# Patient Record
Sex: MALE | Race: WHITE | NOT HISPANIC OR LATINO | Employment: FULL TIME | URBAN - METROPOLITAN AREA
[De-identification: names, ages, dates, MRNs, and addresses within clinical notes are randomized per-mention and may not be internally consistent; named-entity substitution may affect disease eponyms.]

---

## 2017-02-03 ENCOUNTER — OFFICE VISIT (OUTPATIENT)
Dept: FAMILY MEDICINE | Facility: CLINIC | Age: 57
End: 2017-02-03
Payer: COMMERCIAL

## 2017-02-03 DIAGNOSIS — L20.9 ATOPIC DERMATITIS, UNSPECIFIED TYPE: ICD-10-CM

## 2017-02-03 DIAGNOSIS — B35.1 DERMATOPHYTOSIS OF NAIL: Primary | ICD-10-CM

## 2017-02-03 PROCEDURE — 99213 OFFICE O/P EST LOW 20 MIN: CPT | Performed by: PHYSICIAN ASSISTANT

## 2017-02-03 RX ORDER — TRIAMCINOLONE ACETONIDE 5 MG/G
CREAM TOPICAL
Qty: 30 G | Refills: 1 | Status: SHIPPED | OUTPATIENT
Start: 2017-02-03 | End: 2021-02-15

## 2017-02-03 RX ORDER — CICLOPIROX 80 MG/ML
SOLUTION TOPICAL
COMMUNITY
Start: 2017-02-01 | End: 2017-02-03

## 2017-02-03 ASSESSMENT — PAIN SCALES - GENERAL: PAINLEVEL: NO PAIN (0)

## 2017-02-03 NOTE — MR AVS SNAPSHOT
After Visit Summary   2/3/2017    Adriel Elam    MRN: [id number]           Patient Information     Date Of Birth          1960        Visit Information        Provider Department      2/3/2017 3:40 PM Sj Boyd PA-C [facility]        Today's Diagnoses     Dermatophytosis of nail    -  1     Atopic dermatitis, unspecified type           Care Instructions    [facility]    If you have any questions regarding to your visit please contact your care team:       Team Purple:   Clinic Hours Telephone Number   [address] Dr[address]   7am-7pm  Monday - Thursday   7am-5pm  Fridays  [phone]  (Appointment scheduling available 24/7)    Questions about your Visit?   Team Line:  [phone]   [facility] [place] [facility] 11am-9pm Monday-Friday Saturday-Sunday- 9am-5pm   [place] - 5pm-9pm Monday-Friday Saturday-Sunday- 9am-5pm  [phone] - [facility]  [phone] - [place]       What options do I have for visits at the clinic other than the traditional office visit?  To expand how we care for you, many of our providers are utilizing electronic visits (e-visits) and telephone visits, when medically appropriate, for interactions with their patients rather than a visit in the clinic.   We also offer nurse visits for many medical concerns. Just like any other service, we will bill your insurance company for this type of visit based on time spent on the phone with your provider. Not all insurance companies cover these visits. Please check with your medical insurance if this type of visit is covered. You will be responsible for any charges that are not paid by your insurance.      E-visits via [employer]:  generally incur a $35.00 fee.  Telephone visits:  Time spent on the phone: *charged based on time that is spent on the phone in increments of 10 minutes. Estimated cost:   5-10 mins  "$30.00   11-20 mins. $59.00   21-30 mins. $85.00     Use [employer] (secure email communication and access to your chart) to send your primary care provider a message or make an appointment. Ask someone on your Team how to sign up for [employer].  For a Price Quote for your services, please call our [employer] Price Line at [phone].  As always, Thank you for trusting us with your health care needs!    Discharged by Susana Choi CMA          Follow-ups after your visit        Who to contact     If you have questions or need follow up information about today's clinic visit or your schedule please contact [facility] directly at [phone].  Normal or non-critical lab and imaging results will be communicated to you by [employer], letter or phone within 4 business days after the clinic has received the results. If you do not hear from us within 7 days, please contact the clinic through [employer] or phone. If you have a critical or abnormal lab result, we will notify you by phone as soon as possible.  Submit refill requests through [employer] or call your pharmacy and they will forward the refill request to us. Please allow 3 business days for your refill to be completed.          Additional Information About Your Visit        [employer] Information     [employer] lets you send messages to your doctor, view your test results, renew your prescriptions, schedule appointments and more. To sign up, go to [email or web address] . Click on \"Log in\" on the left side of the screen, which will take you to the Welcome page. Then click on \"Sign up Now\" on the right side of the page.     You will be asked to enter the access code listed below, as well as some personal information. Please follow the directions to create your username and password.     Your access code is: [email or web address]  Expires: 2017  4:18 PM     Your access code will  in 90 days. If you need help or a new code, please call your [facility] or " "[phone].        Care EveryWhere ID     This is your Care EveryWhere ID. This could be used by other organizations to access your [place] medical records  [id number]        Your Vitals Were     Pulse Temperature Respirations Height BMI (Body Mass Index) Pulse Oximetry    59 98.6  F (37  C) (Oral) 16 5' 11.75\" (1.822 m) 30.74 kg/m2 99%       Blood Pressure from Last 3 Encounters:   02/03/17 142/80   09/06/16 154/96   03/06/16 183/84    Weight from Last 3 Encounters:   02/03/17 225 lb (102.059 kg)   09/06/16 209 lb (94.802 kg)   03/06/16 216 lb (97.977 kg)              Today, you had the following     No orders found for display         Today's Medication Changes          These changes are accurate as of: 2/3/17  4:18 PM.  If you have any questions, ask your nurse or doctor.               Start taking these medicines.        Dose/Directions    triamcinolone 0.5 % cream   Commonly known as:  KENALOG   Used for:  Atopic dermatitis, unspecified type   Started by:  Sj Boyd PA-C        Apply sparingly to affected area three times daily.   Quantity:  30 g   Refills:  1            Where to get your medicines      [facility] - [address]  [address] [facility] [address]    Hours:  24-hours Phone:  [phone]    - triamcinolone 0.5 % cream             Primary Care Provider Office Phone # Fax #    Sj Boyd PA-C [phone]       [address] [address]        Thank you!     Thank you for choosing [facility]  for your care. Our goal is always to provide you with excellent care. Hearing back from our patients is one way we can continue to improve our services. Please take a few minutes to complete the written survey that you may receive in the mail after your visit with us. Thank you!             Your " Updated Medication List - Protect others around you: Learn how to safely use, store and throw away your medicines at www.disposemymeds.org.          This list is accurate as of: 2/3/17  4:18 PM.  Always use your most recent med list.                   Brand Name Dispense Instructions for use    levothyroxine 88 MCG tablet    SYNTHROID/LEVOTHROID    90 tablet    Take 1 tablet (88 mcg) by mouth daily       ondansetron 8 MG tablet    ZOFRAN    9 tablet    Take 1 tablet (8 mg) by mouth every 8 hours as needed for nausea       triamcinolone 0.5 % cream    KENALOG    30 g    Apply sparingly to affected area three times daily.

## 2017-02-03 NOTE — PROGRESS NOTES
"Chief Complaint   Patient presents with     Toenail       Initial /80 mmHg  Pulse 59  Temp(Src) 98.6  F (37  C) (Oral)  Resp 16  Ht 5' 11.75\" (1.822 m)  Wt 225 lb (102.059 kg)  BMI 30.74 kg/m2  SpO2 99% Estimated body mass index is 30.74 kg/(m^2) as calculated from the following:    Height as of this encounter: 5' 11.75\" (1.822 m).    Weight as of this encounter: 225 lb (102.059 kg).  Medication Reconciliation: complete   Regular BP cuff  Idalia Gonsalez CMA        SUBJECTIVE:                                                    Adriel Elam is a 56 year old male who presents to clinic today for the following health issues:    Chief Complaint   Patient presents with     Toenail       Problem list and histories reviewed & adjusted, as indicated.  Additional history: 57 y/o male here for evaluation of right great toenail issue.  He did go to old PCP who did diagnose with \"fungus\" and given ciclopirox.  After about 1 month, it did seem to work quite well.  He did stop.  Over time, it did come back,      He also does get some dry itchy skin in winter over legs and arms, has used steroid cream in the past with good success.    Problem list, Medication list, Allergies, and Medical/Social/Surgical histories reviewed in EPIC and updated as appropriate.    ROS:  Constitutional, HEENT, cardiovascular, pulmonary, gi and gu systems are negative, except as otherwise noted.    OBJECTIVE:                                                    /80 mmHg  Pulse 59  Temp(Src) 98.6  F (37  C) (Oral)  Resp 16  Ht 5' 11.75\" (1.822 m)  Wt 225 lb (102.059 kg)  BMI 30.74 kg/m2  SpO2 99%  Body mass index is 30.74 kg/(m^2).  GENERAL: alert and no distress  EYES: Eyes grossly normal to inspection  RESP: lungs clear to auscultation - no rales, rhonchi or wheezes  CV: regular rate and rhythm, normal S1 S2, no S3 or S4, no murmur, click or rub, no peripheral edema and peripheral pulses strong  SKIN: onychomycosis over great " toe, non tender.    Diagnostic Test Results:  none      ASSESSMENT/PLAN:                                                            1. Dermatophytosis of nail  Patient does have ciclopirox refill, and since it did work last time, will use again.    2. Atopic dermatitis, unspecified type    - triamcinolone (KENALOG) 0.5 % cream; Apply sparingly to affected area three times daily.  Dispense: 30 g; Refill: 1    Follow up as needed.  Patient is due for labs, immunizations and colorectal screening.  Did encourage him to follow up with more complete exam so we can discuss updating.      Sj Boyd PA-C  [facility]

## 2017-02-03 NOTE — NURSING NOTE
"Chief Complaint   Patient presents with     Toenail       Initial /80 mmHg  Pulse 59  Temp(Src) 98.6  F (37  C) (Oral)  Resp 16  Ht 5' 11.75\" (1.822 m)  Wt 225 lb (102.059 kg)  BMI 30.74 kg/m2  SpO2 99% Estimated body mass index is 30.74 kg/(m^2) as calculated from the following:    Height as of this encounter: 5' 11.75\" (1.822 m).    Weight as of this encounter: 225 lb (102.059 kg).  Medication Reconciliation: complete   Idalia Gonsalez CMA      "

## 2017-02-03 NOTE — PATIENT INSTRUCTIONS
[facility]    If you have any questions regarding to your visit please contact your care team:       Team Purple:   Clinic Hours Telephone Number   [address] Dr.   7am-7pm  Monday - Thursday   7am-5pm  Fridays  [phone]  (Appointment scheduling available 24/7)    Questions about your Visit?   Team Line:  [phone]   Urgent Care - [place] and [facility] - 11am-9pm Monday-Friday Saturday-Sunday- 9am-5pm   [place] - 5pm-9pm Monday-Friday Saturday-Sunday- 9am-5pm  [phone] - [facility]  [phone] - [place]       What options do I have for visits at the clinic other than the traditional office visit?  To expand how we care for you, many of our providers are utilizing electronic visits (e-visits) and telephone visits, when medically appropriate, for interactions with their patients rather than a visit in the clinic.   We also offer nurse visits for many medical concerns. Just like any other service, we will bill your insurance company for this type of visit based on time spent on the phone with your provider. Not all insurance companies cover these visits. Please check with your medical insurance if this type of visit is covered. You will be responsible for any charges that are not paid by your insurance.      E-visits via [employer]:  generally incur a $35.00 fee.  Telephone visits:  Time spent on the phone: *charged based on time that is spent on the phone in increments of 10 minutes. Estimated cost:   5-10 mins $30.00   11-20 mins. $59.00   21-30 mins. $85.00     Use [employer] (secure email communication and access to your chart) to send your primary care provider a message or make an appointment. Ask someone on your Team how to sign up for [employer].  For a Price Quote for your services, please call our Consumer Price Line at [phone].  As always, Thank you for trusting us with your health care  needs!    Discharged by Susana Choi, CMA

## 2017-02-10 VITALS
WEIGHT: 225 LBS | SYSTOLIC BLOOD PRESSURE: 136 MMHG | HEART RATE: 59 BPM | TEMPERATURE: 98.6 F | DIASTOLIC BLOOD PRESSURE: 80 MMHG | OXYGEN SATURATION: 99 % | HEIGHT: 72 IN | BODY MASS INDEX: 30.48 KG/M2 | RESPIRATION RATE: 16 BRPM

## 2017-06-12 ENCOUNTER — OFFICE VISIT (OUTPATIENT)
Dept: FAMILY MEDICINE | Facility: CLINIC | Age: 57
End: 2017-06-12
Payer: COMMERCIAL

## 2017-06-12 VITALS
SYSTOLIC BLOOD PRESSURE: 164 MMHG | BODY MASS INDEX: 31.77 KG/M2 | TEMPERATURE: 97.3 F | WEIGHT: 234.6 LBS | HEIGHT: 72 IN | HEART RATE: 70 BPM | DIASTOLIC BLOOD PRESSURE: 80 MMHG | OXYGEN SATURATION: 97 %

## 2017-06-12 DIAGNOSIS — F41.1 GAD (GENERALIZED ANXIETY DISORDER): Primary | ICD-10-CM

## 2017-06-12 DIAGNOSIS — E03.9 HYPOTHYROIDISM (ACQUIRED): ICD-10-CM

## 2017-06-12 DIAGNOSIS — E78.5 HYPERLIPIDEMIA LDL GOAL <100: ICD-10-CM

## 2017-06-12 DIAGNOSIS — R97.20 ELEVATED PROSTATE SPECIFIC ANTIGEN (PSA): ICD-10-CM

## 2017-06-12 DIAGNOSIS — Z12.5 SCREENING FOR PROSTATE CANCER: ICD-10-CM

## 2017-06-12 DIAGNOSIS — R03.0 ELEVATED BLOOD PRESSURE READING WITHOUT DIAGNOSIS OF HYPERTENSION: ICD-10-CM

## 2017-06-12 DIAGNOSIS — Z12.11 SCREENING FOR COLON CANCER: ICD-10-CM

## 2017-06-12 PROCEDURE — 99214 OFFICE O/P EST MOD 30 MIN: CPT | Performed by: FAMILY MEDICINE

## 2017-06-12 RX ORDER — BUSPIRONE HYDROCHLORIDE 5 MG/1
TABLET ORAL
Qty: 150 TABLET | Refills: 0 | Status: SHIPPED | OUTPATIENT
Start: 2017-06-12 | End: 2017-07-17

## 2017-06-12 RX ORDER — PROCHLORPERAZINE MALEATE 10 MG
10 TABLET ORAL EVERY 6 HOURS PRN
COMMUNITY
End: 2017-10-20

## 2017-06-12 NOTE — NURSING NOTE
"Chief Complaint   Patient presents with     Establish Care     Anxiety     SOB, minor chest ache, nervous, Nausea        Initial /80  Pulse 70  Temp 97.3  F (36.3  C) (Oral)  Ht 5' 11.75\" (1.822 m)  Wt 234 lb 9.6 oz (106.4 kg)  SpO2 97%  BMI 32.04 kg/m2 Estimated body mass index is 32.04 kg/(m^2) as calculated from the following:    Height as of this encounter: 5' 11.75\" (1.822 m).    Weight as of this encounter: 234 lb 9.6 oz (106.4 kg).  Medication Reconciliation: complete     An TANIA Roberts    "

## 2017-06-12 NOTE — PROGRESS NOTES
SUBJECTIVE:                                                    Adriel Elam is a 57 year old male who presents to clinic today for the following health issues:     Abnormal Mood Symptoms      Duration: ongoing for 2 yrs     Description:  Depression: no   Anxiety: YES  Panic attacks: no      Accompanying signs and symptoms: see PHQ-9 and KIMANI scores    History (similar episodes/previous evaluation): None    Precipitating or alleviating factors: None    Therapies tried and outcome: none     Has nervous for past 2 yr; increased.  Worse in the last one month.  Stress: work is stressful, taking care of elderly parents.  Appetite and sleep great.  Anxious over; stressful situations, gets SOB, chest pain, nausea, a little shaky, a little sweat around the mouth.    Nausea:   Uses Prochlorperazine 10 m g  and Zofran 8 mg.     Hypothyroidism:    Been on Levothyroxine; at 88 mcg.    Elevated BP;  BP Readings from Last 6 Encounters:   06/12/17 164/80   02/03/17 136/80   09/06/16 (!) 154/96   03/06/16 183/84   01/18/16 138/84     Weight:   Been gaining weight, admits not doing any exercise.  Wt Readings from Last 4 Encounters:   06/12/17 234 lb 9.6 oz (106.4 kg)   02/03/17 225 lb (102.1 kg)   09/06/16 209 lb (94.8 kg)   03/06/16 216 lb (98 kg)     Problem list and histories reviewed & adjusted, as indicated.  Additional history: as documented    Patient Active Problem List   Diagnosis     Hypothyroidism     History reviewed. No pertinent surgical history.    Social History   Substance Use Topics     Smoking status: Never Smoker     Smokeless tobacco: Never Used     Alcohol use 0.0 oz/week     0 Standard drinks or equivalent per week      Comment: Rare      Family History   Problem Relation Age of Onset     CANCER No family hx of      DIABETES No family hx of      Hypertension No family hx of      CEREBROVASCULAR DISEASE No family hx of      Macular Degeneration No family hx of      Glaucoma No family hx of      Thyroid Disease  "No family hx of            Reviewed and updated as needed this visit by clinical staff  Tobacco  Allergies  Meds  Med Hx  Surg Hx  Fam Hx  Soc Hx      Reviewed and updated as needed this visit by Provider         ROS:  Constitutional, HEENT, cardiovascular, pulmonary, gi and gu systems are negative, except as otherwise noted.    OBJECTIVE:                                                    /80  Pulse 70  Temp 97.3  F (36.3  C) (Oral)  Ht 5' 11.75\" (1.822 m)  Wt 234 lb 9.6 oz (106.4 kg)  SpO2 97%  BMI 32.04 kg/m2  Body mass index is 32.04 kg/(m^2).  GENERAL: healthy, alert and no distress  NECK: no adenopathy, no asymmetry, masses, or scars and thyroid normal to palpation  RESP: lungs clear to auscultation - no rales, rhonchi or wheezes  CV: regular rate and rhythm, normal S1 S2, no S3 or S4, no murmur, click or rub, no peripheral edema and peripheral pulses strong  ABDOMEN: soft, nontender, no hepatosplenomegaly, no masses and bowel sounds normal  MS: no gross musculoskeletal defects noted, no edema    Diagnostic Test Results:        Results for orders placed or performed in visit on 09/06/16   TSH WITH FREE T4 REFLEX   Result Value Ref Range    TSH 2.89 0.40 - 4.00 mU/L        ASSESSMENT/PLAN:                                                    (F41.1) KIMANI (generalized anxiety disorder)  (primary encounter diagnosis)  Comment: I discussed the potential side effects of antianxiety medications as well as the liklihood of worsening before improvement over the next few weeks. I reemphasized the importance of a multi-armed approach towards the treatment of anxiety including regular exercise, adequate sleep, and a well rounded, low-glycemic diet.  In addition, I emphasized the importance of ongoing development of his support network. If new or worsening symptoms, he will seek help immediately.  Plan: busPIRone (BUSPAR) 5 MG tablet    (E03.9) Hypothyroidism (acquired)  Comment: Taking " Levothyroxine.  Plan: TSH with free T4 reflex    (Z68.32) BMI 32.0-32.9,adult  Comment: Counseled to make better food choices, exercise as tolerated, and lose weight.     (E78.5) Hyperlipidemia LDL goal <100  Comment: Cholesterol levels good. The 10-year ASCVD risk score (Laurie PETERSON Jr, et al., 2013) is: 9.1%. At this time will work on healthy lifestyle and recheck in 3-6 months.  Plan: Lipid panel reflex to direct LDL    (R03.0) Elevated blood pressure reading without diagnosis of hypertension  Comment: Has had some high readings. Will recheck in 2 weeks if still high will seriously discussed initiating medication.  Plan: Basic metabolic panel    (Z12.11) Screening for colon cancer  Comment: Will do FIT  Plan: Fecal colorectal cancer screen (FIT), Prostate         spec antigen screen    (Z12.5) Screening for prostate cancer  Comment: PSA elevated at 6.89.  Plan: Urology evaluation.    Follow up in 1 month or sooner with concerns    Luis E Gill MD  [facility]

## 2017-06-12 NOTE — MR AVS SNAPSHOT
After Visit Summary   6/12/2017    Adriel Elam    MRN: [id number]           Patient Information     Date Of Birth          1960        Visit Information        Provider Department      6/12/2017 6:40 PM Luis E Gill MD [facility]        Today's Diagnoses     KIMANI (generalized anxiety disorder)    -  1    Hypothyroidism (acquired)        BMI 32.0-32.9,adult        Hyperlipidemia LDL goal <100        Elevated blood pressure reading without diagnosis of hypertension        Screening for colon cancer        Screening for prostate cancer          Care Instructions    [facility]    If you have any questions regarding to your visit please contact your care team:       Team Purple:   Clinic Hours Telephone Number   Dr. Chacha Antony     7am-7pm  Monday - Thursday   7am-5pm  Fridays  [phone]  (Appointment scheduling available 24/7)    Questions about your Visit?   Team Line:  [phone]   [facility] [place] [facility] [place] [facility] 11am-9pm Monday-Friday Saturday-Sunday- 9am-5pm   [place] - 5pm-9pm Monday-Friday Saturday-Sunday- 9am-5pm  [phone] - [place]   [phone] - [place]       What options do I have for visits at the clinic other than the traditional office visit?  To expand how we care for you, many of our providers are utilizing electronic visits (e-visits) and telephone visits, when medically appropriate, for interactions with their patients rather than a visit in the clinic.   We also offer nurse visits for many medical concerns. Just like any other service, we will bill your insurance company for this type of visit based on time spent on the phone with your provider. Not all insurance companies cover these visits. Please check with your medical insurance if this type of visit is covered. You will be responsible for any charges that are not paid by your insurance.      E-visits via  [employer]hart:  generally incur a $35.00 fee.  Telephone visits:  Time spent on the phone: *charged based on time that is spent on the phone in increments of 10 minutes. Estimated cost:   5-10 mins $30.00   11-20 mins. $59.00   21-30 mins. $85.00     Use [employer]hart (secure email communication and access to your chart) to send your primary care provider a message or make an appointment. Ask someone on your Team how to sign up for [employer].  For a Price Quote for your services, please call our [employer] Line at [phone].  As always, Thank you for trusting us with your health care needs!              Follow-ups after your visit        Follow-up notes from your care team     Return in about 4 weeks (around 7/10/2017).      Future tests that were ordered for you today     Open Future Orders        Priority Expected Expires Ordered    Prostate spec antigen screen Routine  6/12/2018 6/12/2017    Lipid panel reflex to direct LDL Routine  6/12/2018 6/12/2017    Basic metabolic panel Routine  6/12/2018 6/12/2017    TSH with free T4 reflex Routine  6/12/2018 6/12/2017    Fecal colorectal cancer screen (FIT) Routine 7/3/2017 9/4/2017 6/12/2017            Who to contact     If you have questions or need follow up information about today's clinic visit or your schedule please contact [facility] directly at [phone].  Normal or non-critical lab and imaging results will be communicated to you by MyChart, letter or phone within 4 business days after the clinic has received the results. If you do not hear from us within 7 days, please contact the clinic through [employer]hart or phone. If you have a critical or abnormal lab result, we will notify you by phone as soon as possible.  Submit refill requests through 1CLICK or call your pharmacy and they will forward the refill request to us. Please allow 3 business days for your refill to be completed.          Additional Information About Your Visit        MyChart Information   "   Swing[employer]ot lets you send messages to your doctor, view your test results, renew your prescriptions, schedule appointments and more. To sign up, go to [email or web address] . Click on \"Log in\" on the left side of the screen, which will take you to the Welcome page. Then click on \"Sign up Now\" on the right side of the page.     You will be asked to enter the access code listed below, as well as some personal information. Please follow the directions to create your username and password.     Your access code is: [email or web address]  Expires: 9/10/2017  7:34 PM     Your access code will  in 90 days. If you need help or a new code, please call your [facility] clinic or [phone].        Care EveryWhere ID     This is your Care EveryWhere ID. This could be used by other organizations to access your [facility] medical records  [id number]        Your Vitals Were     Pulse Temperature Height Pulse Oximetry BMI (Body Mass Index)       70 97.3  F (36.3  C) (Oral) 5' 11.75\" (1.822 m) 97% 32.04 kg/m2        Blood Pressure from Last 3 Encounters:   17 164/80   17 136/80   16 (!) 154/96    Weight from Last 3 Encounters:   17 234 lb 9.6 oz (106.4 kg)   17 225 lb (102.1 kg)   16 209 lb (94.8 kg)                 Today's Medication Changes          These changes are accurate as of: 17  7:34 PM.  If you have any questions, ask your nurse or doctor.               Start taking these medicines.        Dose/Directions    busPIRone 5 MG tablet   Commonly known as:  BUSPAR   Used for:  KIMANI (generalized anxiety disorder)   Started by:  Luis E Gill MD        Start at 5 mg twice daily for 3 days, then 7.5 mg (1.5 tabs) twice daily for 3 days, then 10 mg (2 tabs) twice daily for 3 days, then 12.5 mg (2.5 tabs) twice daily for 3 days, then 15 mg (3 tabs) twice daily and stay at that dose   Quantity:  150 tablet   Refills:  0            Where to get your medicines      Some of these will need a " paper prescription and others can be bought over the counter.  Ask your nurse if you have questions.     Bring a paper prescription for each of these medications     busPIRone 5 MG tablet                Primary Care Provider Office Phone # Fax #    Sj Boyd PA-C [phone]       [facility] [address]  [place] [address]        Thank you!     Thank you for choosing [facility]  for your care. Our goal is always to provide you with excellent care. Hearing back from our patients is one way we can continue to improve our services. Please take a few minutes to complete the written survey that you may receive in the mail after your visit with us. Thank you!             Your Updated Medication List - Protect others around you: Learn how to safely use, store and throw away your medicines at www.disposemymeds.org.          This list is accurate as of: 6/12/17  7:34 PM.  Always use your most recent med list.                   Brand Name Dispense Instructions for use    busPIRone 5 MG tablet    BUSPAR    150 tablet    Start at 5 mg twice daily for 3 days, then 7.5 mg (1.5 tabs) twice daily for 3 days, then 10 mg (2 tabs) twice daily for 3 days, then 12.5 mg (2.5 tabs) twice daily for 3 days, then 15 mg (3 tabs) twice daily and stay at that dose       levothyroxine 88 MCG tablet    SYNTHROID/LEVOTHROID    90 tablet    Take 1 tablet (88 mcg) by mouth daily       ondansetron 8 MG tablet    ZOFRAN    9 tablet    Take 1 tablet (8 mg) by mouth every 8 hours as needed for nausea       prochlorperazine 10 MG tablet    COMPAZINE     Take 10 mg by mouth every 6 hours as needed for nausea or vomiting       triamcinolone 0.5 % cream    KENALOG    30 g    Apply sparingly to affected area three times daily.

## 2017-06-13 ENCOUNTER — TELEPHONE (OUTPATIENT)
Dept: FAMILY MEDICINE | Facility: CLINIC | Age: 57
End: 2017-06-13

## 2017-06-13 DIAGNOSIS — R03.0 ELEVATED BLOOD PRESSURE READING WITHOUT DIAGNOSIS OF HYPERTENSION: ICD-10-CM

## 2017-06-13 DIAGNOSIS — Z12.11 SCREENING FOR COLON CANCER: ICD-10-CM

## 2017-06-13 DIAGNOSIS — E78.5 HYPERLIPIDEMIA LDL GOAL <100: ICD-10-CM

## 2017-06-13 DIAGNOSIS — E03.9 HYPOTHYROIDISM (ACQUIRED): ICD-10-CM

## 2017-06-13 PROCEDURE — 84443 ASSAY THYROID STIM HORMONE: CPT | Performed by: FAMILY MEDICINE

## 2017-06-13 PROCEDURE — 36415 COLL VENOUS BLD VENIPUNCTURE: CPT | Performed by: FAMILY MEDICINE

## 2017-06-13 PROCEDURE — 80048 BASIC METABOLIC PNL TOTAL CA: CPT | Performed by: FAMILY MEDICINE

## 2017-06-13 PROCEDURE — G0103 PSA SCREENING: HCPCS | Performed by: FAMILY MEDICINE

## 2017-06-13 PROCEDURE — 80061 LIPID PANEL: CPT | Performed by: FAMILY MEDICINE

## 2017-06-13 ASSESSMENT — ANXIETY QUESTIONNAIRES
GAD7 TOTAL SCORE: 8
2. NOT BEING ABLE TO STOP OR CONTROL WORRYING: MORE THAN HALF THE DAYS
6. BECOMING EASILY ANNOYED OR IRRITABLE: NOT AT ALL
7. FEELING AFRAID AS IF SOMETHING AWFUL MIGHT HAPPEN: SEVERAL DAYS
5. BEING SO RESTLESS THAT IT IS HARD TO SIT STILL: NOT AT ALL
1. FEELING NERVOUS, ANXIOUS, OR ON EDGE: MORE THAN HALF THE DAYS
IF YOU CHECKED OFF ANY PROBLEMS ON THIS QUESTIONNAIRE, HOW DIFFICULT HAVE THESE PROBLEMS MADE IT FOR YOU TO DO YOUR WORK, TAKE CARE OF THINGS AT HOME, OR GET ALONG WITH OTHER PEOPLE: NOT DIFFICULT AT ALL
3. WORRYING TOO MUCH ABOUT DIFFERENT THINGS: MORE THAN HALF THE DAYS

## 2017-06-13 ASSESSMENT — PATIENT HEALTH QUESTIONNAIRE - PHQ9: 5. POOR APPETITE OR OVEREATING: SEVERAL DAYS

## 2017-06-13 NOTE — PATIENT INSTRUCTIONS
[facility]    If you have any questions regarding to your visit please contact your care team:       Team Purple:   Clinic Hours Telephone Number   Dr. Chacha Antony     7am-7pm  Monday - Thursday   7am-5pm  Fridays  [phone]  (Appointment scheduling available 24/7)    Questions about your Visit?   Team Line:  [phone]   Urgent Care - [place] and [facility] - 11am-9pm Monday-Friday Saturday-Sunday- 9am-5pm   [place] - 5pm-9pm Monday-Friday Saturday-Sunday- 9am-5pm  [phone] - [facility]  [phone] - [place]       What options do I have for visits at the clinic other than the traditional office visit?  To expand how we care for you, many of our providers are utilizing electronic visits (e-visits) and telephone visits, when medically appropriate, for interactions with their patients rather than a visit in the clinic.   We also offer nurse visits for many medical concerns. Just like any other service, we will bill your insurance company for this type of visit based on time spent on the phone with your provider. Not all insurance companies cover these visits. Please check with your medical insurance if this type of visit is covered. You will be responsible for any charges that are not paid by your insurance.      E-visits via [employer]:  generally incur a $35.00 fee.  Telephone visits:  Time spent on the phone: *charged based on time that is spent on the phone in increments of 10 minutes. Estimated cost:   5-10 mins $30.00   11-20 mins. $59.00   21-30 mins. $85.00     Use "[employer]t (secure email communication and access to your chart) to send your primary care provider a message or make an appointment. Ask someone on your Team how to sign up for [employer].  For a Price Quote for your services, please call our Consumer Price Line at [phone].  As always, Thank you for trusting us with your health care needs!

## 2017-06-13 NOTE — TELEPHONE ENCOUNTER
Reason for Call:  Other prescription    Detailed comments: patient states he was seen yesterday and was told a new script will be sent to pharmacy [facility], please contact the patient to discuss further if any questions,     Phone Number Patient can be reached at: Cell number on file:    Telephone Information:   Mobile [phone]       Best Time: Today    Can we leave a detailed message on this number? YES    Call taken on 6/13/2017 at 10:08 AM by Gladis Espinoza

## 2017-06-13 NOTE — TELEPHONE ENCOUNTER
Prescription faxed to pharmacy this morning, attempted to reach patient NA, unable to leave message. Kathy Beyer MA

## 2017-06-14 LAB
ANION GAP SERPL CALCULATED.3IONS-SCNC: 7 MMOL/L (ref 3–14)
BUN SERPL-MCNC: 21 MG/DL (ref 7–30)
CALCIUM SERPL-MCNC: 8.9 MG/DL (ref 8.5–10.1)
CHLORIDE SERPL-SCNC: 107 MMOL/L (ref 94–109)
CHOLEST SERPL-MCNC: 148 MG/DL
CO2 SERPL-SCNC: 28 MMOL/L (ref 20–32)
CREAT SERPL-MCNC: 1.44 MG/DL (ref 0.66–1.25)
GFR SERPL CREATININE-BSD FRML MDRD: 50 ML/MIN/1.7M2
GLUCOSE SERPL-MCNC: 83 MG/DL (ref 70–99)
HDLC SERPL-MCNC: 50 MG/DL
LDLC SERPL CALC-MCNC: 82 MG/DL
NONHDLC SERPL-MCNC: 98 MG/DL
POTASSIUM SERPL-SCNC: 4.4 MMOL/L (ref 3.4–5.3)
PSA SERPL-ACNC: 6.89 UG/L (ref 0–4)
SODIUM SERPL-SCNC: 142 MMOL/L (ref 133–144)
TRIGL SERPL-MCNC: 78 MG/DL
TSH SERPL DL<=0.005 MIU/L-ACNC: 3.62 MU/L (ref 0.4–4)

## 2017-06-14 ASSESSMENT — PATIENT HEALTH QUESTIONNAIRE - PHQ9: SUM OF ALL RESPONSES TO PHQ QUESTIONS 1-9: 2

## 2017-06-14 ASSESSMENT — ANXIETY QUESTIONNAIRES: GAD7 TOTAL SCORE: 8

## 2017-06-19 ENCOUNTER — NURSE TRIAGE (OUTPATIENT)
Dept: NURSING | Facility: CLINIC | Age: 57
End: 2017-06-19

## 2017-06-20 ENCOUNTER — TELEPHONE (OUTPATIENT)
Dept: FAMILY MEDICINE | Facility: CLINIC | Age: 57
End: 2017-06-20

## 2017-06-20 NOTE — TELEPHONE ENCOUNTER
Reason for Call:  Other returning call    Detailed comments: Patient says he's returning a call back regarding his lab result.    Phone Number Patient can be reached at: Cell number on file:    Telephone Information:   Mobile [phone]       Best Time: anytime    Can we leave a detailed message on this number? YES    Call taken on 6/20/2017 at 8:11 AM by Delma Solares

## 2017-06-20 NOTE — TELEPHONE ENCOUNTER
"Clinic Action Needed: YES  FNA Triage Call  Presenting Problem:\"I am returning a call from my doctor regarding my results.\" Gave message per MD/epic; Notes Recorded by Luis E Gill MD on 6/19/2017 at 7:01 PM  Left vm for patient:   PSA and Creatinine (no previous) elevated would like him to call back at  and discuss the results or can schedule appointment to discuss  Patient Recommendations/Teaching: Patient will try to call clinic in am  Routed to:none  Chacha De La Torre RN [facility]          "

## 2017-06-20 NOTE — PROGRESS NOTES
"  SUBJECTIVE:                                                    Adriel Elam is a 57 year old male who presents to clinic today for the following health issues:    Chief Complaint   Patient presents with     Results     Patient was seen on 6/12/2017 and had blood work and is returning to clinic today to discuss his labs especially the elevated PSA given that he has a strong family history of prostate cancer. His dad had 3 uncles have prostate cancer; one uncle passed away because he did not receive treatment. His dad had it in his 60's but his uncles he is not sure at what ages. He has no urinary symptoms.      Elevated BP:  BP Readings from Last 3 Encounters:   06/21/17 156/90   06/12/17 164/80   02/03/17 136/80     Patient Active Problem List   Diagnosis     Hypothyroidism     History reviewed. No pertinent surgical history.    Social History   Substance Use Topics     Smoking status: Never Smoker     Smokeless tobacco: Never Used     Alcohol use 0.0 oz/week     0 Standard drinks or equivalent per week      Comment: Rare      Family History   Problem Relation Age of Onset     CANCER No family hx of      DIABETES No family hx of      Hypertension No family hx of      CEREBROVASCULAR DISEASE No family hx of      Macular Degeneration No family hx of      Glaucoma No family hx of      Thyroid Disease No family hx of            Reviewed and updated as needed this visit by clinical staff       Reviewed and updated as needed this visit by Provider         ROS:  Constitutional, HEENT, cardiovascular, pulmonary, gi and gu systems are negative, except as otherwise noted.    OBJECTIVE:                                                    /90 (Cuff Size: Adult Large)  Pulse 68  Temp 97.7  F (36.5  C) (Oral)  Ht 5' 11.75\" (1.822 m)  Wt 231 lb (104.8 kg)  SpO2 97%  BMI 31.55 kg/m2  Body mass index is 31.55 kg/(m^2).  GENERAL: healthy, alert and no distress  NECK: no adenopathy and thyroid normal to palpation  RESP: " lungs clear to auscultation - no rales, rhonchi or wheezes  CV: regular rate and rhythm,  no murmur, click or rub, no peripheral edema   ABDOMEN: soft, nontender, no masses and bowel sounds normal  MS: no gross musculoskeletal defects noted, no edema    Diagnostic Test Results:  none      ASSESSMENT/PLAN:                                                    (R97.20) Elevated prostate specific antigen (PSA)  (primary encounter diagnosis)  Comment: Discussed results including the elevated PSA; can be from inflammation, infection, prostate enlargement or tumor. He will follow up with urology  Plan: UROLOGY ADULT REFERRAL,     (R03.0) Elevated blood pressure reading without diagnosis of hypertension  Comment: Has had a few high reading, he will like to do lifestyle changes and see if that helps.  Plan: Recheck inn 1 month    (Z68.31) BMI 31.0-31.9,adult  Comment: Counseled to make better food choices, exercise as tolerated, and lose weight.     (Z12.11) Screen for colon cancer  Comment: Has FIT kit will send it in      Luis E Gill MD  [facility]

## 2017-06-20 NOTE — TELEPHONE ENCOUNTER
RN triage gave patient this message from result note (see TE): Notes Recorded by Luis E Gill MD on 6/19/2017 at 7:01 PMLt  for patient:  PSA and Creatinine (no previous) elevated would like him to call back at  and discuss the results or can schedule appointment to discuss.    Patient now returning call. Please advise   Britni Sow RN

## 2017-06-21 ENCOUNTER — OFFICE VISIT (OUTPATIENT)
Dept: FAMILY MEDICINE | Facility: CLINIC | Age: 57
End: 2017-06-21
Payer: COMMERCIAL

## 2017-06-21 VITALS
SYSTOLIC BLOOD PRESSURE: 156 MMHG | TEMPERATURE: 97.7 F | WEIGHT: 231 LBS | BODY MASS INDEX: 31.29 KG/M2 | HEIGHT: 72 IN | HEART RATE: 68 BPM | OXYGEN SATURATION: 97 % | DIASTOLIC BLOOD PRESSURE: 90 MMHG

## 2017-06-21 DIAGNOSIS — Z12.11 SCREEN FOR COLON CANCER: ICD-10-CM

## 2017-06-21 DIAGNOSIS — R03.0 ELEVATED BLOOD PRESSURE READING WITHOUT DIAGNOSIS OF HYPERTENSION: ICD-10-CM

## 2017-06-21 DIAGNOSIS — R97.20 ELEVATED PROSTATE SPECIFIC ANTIGEN (PSA): Primary | ICD-10-CM

## 2017-06-21 PROCEDURE — 99213 OFFICE O/P EST LOW 20 MIN: CPT | Performed by: FAMILY MEDICINE

## 2017-06-21 NOTE — PATIENT INSTRUCTIONS
[facility]    If you have any questions regarding to your visit please contact your care team:       Team Purple:   Clinic Hours Telephone Number   Dr. Chacha Antony     7am-7pm  Monday - Thursday   7am-5pm  Fridays  [phone]  (Appointment scheduling available 24/7)    Questions about your Visit?   Team Line:  [phone]   Urgent Care - [place] and [facility] - 11am-9pm Monday-Friday Saturday-Sunday- 9am-5pm   [place] - 5pm-9pm Monday-Friday Saturday-Sunday- 9am-5pm  [phone] - [facility]  [phone] - [place]       What options do I have for visits at the clinic other than the traditional office visit?  To expand how we care for you, many of our providers are utilizing electronic visits (e-visits) and telephone visits, when medically appropriate, for interactions with their patients rather than a visit in the clinic.   We also offer nurse visits for many medical concerns. Just like any other service, we will bill your insurance company for this type of visit based on time spent on the phone with your provider. Not all insurance companies cover these visits. Please check with your medical insurance if this type of visit is covered. You will be responsible for any charges that are not paid by your insurance.      E-visits via [employer]:  generally incur a $35.00 fee.  Telephone visits:  Time spent on the phone: *charged based on time that is spent on the phone in increments of 10 minutes. Estimated cost:   5-10 mins $30.00   11-20 mins. $59.00   21-30 mins. $85.00     Use [employer] (secure email communication and access to your chart) to send your primary care provider a message or make an appointment. Ask someone on your Team how to sign up for [employer].  For a Price Quote for your services, please call our Consumer Price Line at [phone].  As always, Thank you for trusting us with your health care needs!    Discharged by [place]  DEEPAK RANGEL ([facility])

## 2017-06-21 NOTE — MR AVS SNAPSHOT
After Visit Summary   6/21/2017    Adriel Elam    MRN: [id number]           Patient Information     Date Of Birth          1960        Visit Information        Provider Department      6/21/2017 2:40 PM Luis E Gill MD [facility]        Today's Diagnoses     Elevated prostate specific antigen (PSA)    -  1    Hypothyroidism, unspecified type        Screen for colon cancer        Need for hepatitis C screening test        Need for prophylactic vaccination with tetanus-diphtheria (TD)          Care Instructions    [facility]    If you have any questions regarding to your visit please contact your care team:       Team Purple:   Clinic Hours Telephone Number   Dr. Chacha Antony     7am-7pm  Monday - Thursday   7am-5pm  Fridays  [phone]  (Appointment scheduling available 24/7)    Questions about your Visit?   Team Line:  [phone]   [facility] [place] [facility] 11am-9pm Monday-Friday Saturday-Sunday- 9am-5pm   [place] - 5pm-9pm Monday-Friday Saturday-Sunday- 9am-5pm  [phone] - [place]   [phone] - [place]       What options do I have for visits at the clinic other than the traditional office visit?  To expand how we care for you, many of our providers are utilizing electronic visits (e-visits) and telephone visits, when medically appropriate, for interactions with their patients rather than a visit in the clinic.   We also offer nurse visits for many medical concerns. Just like any other service, we will bill your insurance company for this type of visit based on time spent on the phone with your provider. Not all insurance companies cover these visits. Please check with your medical insurance if this type of visit is covered. You will be responsible for any charges that are not paid by your insurance.      E-visits via TaskBeat:  generally incur a $35.00 fee.  Telephone  visits:  Time spent on the phone: *charged based on time that is spent on the phone in increments of 10 minutes. Estimated cost:   5-10 mins $30.00   11-20 mins. $59.00   21-30 mins. $85.00     Use KiwiTecht (secure email communication and access to your chart) to send your primary care provider a message or make an appointment. Ask someone on your Team how to sign up for [employer].  For a Price Quote for your services, please call our [employer] Price Line at [phone].  As always, Thank you for trusting us with your health care needs!    Discharged by Haydee RANGEL CMA ([facility])            Follow-ups after your visit        Additional Services     UROLOGY ADULT REFERRAL       Your provider has referred you to: [facility]: [facility], [facility]. [facility] [phone]   [email or web address]    Please be aware that coverage of these services is subject to the terms and limitations of your health insurance plan.  Call member services at your health plan with any benefit or coverage questions.      Please bring the following to your appointment:    >>   Any x-rays, CTs or MRIs which have been performed.  Contact the facility where they were done to arrange for  prior to your scheduled appointment.  Any new CT, MRI or other procedures ordered by your specialist must be performed at a [place] facility or coordinated by your clinic's referral office.    >>   List of current medications   >>   This referral request   >>   Any documents/labs given to you for this referral                  Who to contact     If you have questions or need follow up information about today's clinic visit or your schedule please contact [facility] directly at [phone].  Normal or non-critical lab and imaging results will be communicated to you by MyChart, letter or phone within 4 business days after the clinic has received the results. If you do not hear from us within 7 days, please contact the clinic through KiwiTecht or  "phone. If you have a critical or abnormal lab result, we will notify you by phone as soon as possible.  Submit refill requests through Lightbox or call your pharmacy and they will forward the refill request to us. Please allow 3 business days for your refill to be completed.          Additional Information About Your Visit        [employer] Information     Lightbox lets you send messages to your doctor, view your test results, renew your prescriptions, schedule appointments and more. To sign up, go to [email or web address] . Click on \"Log in\" on the left side of the screen, which will take you to the Welcome page. Then click on \"Sign up Now\" on the right side of the page.     You will be asked to enter the access code listed below, as well as some personal information. Please follow the directions to create your username and password.     Your access code is: [email or web address]  Expires: 9/10/2017  7:34 PM     Your access code will  in 90 days. If you need help or a new code, please call your [facility] clinic or [phone].        Care EveryWhere ID     This is your Care EveryWhere ID. This could be used by other organizations to access your [facility] medical records  [id number]        Your Vitals Were     Pulse Temperature Height Pulse Oximetry BMI (Body Mass Index)       68 97.7  F (36.5  C) (Oral) 5' 11.75\" (1.822 m) 97% 31.55 kg/m2        Blood Pressure from Last 3 Encounters:   17 (!) 158/98   17 164/80   17 136/80    Weight from Last 3 Encounters:   17 231 lb (104.8 kg)   17 234 lb 9.6 oz (106.4 kg)   17 225 lb (102.1 kg)              We Performed the Following     UROLOGY ADULT REFERRAL        Primary Care Provider Office Phone # Fax #    Sj Boyd PA-C [phone]       [facility] [address] [address]        Equal Access to Services     HEATHER BANUELOS AH: Nelda Martínez, wachance ashraf" brett smithdenis tovar'aan ah. Crista [facility] [phone].    ATENCIÓN: Si tiara appiah, tiene a smith disposición servicios gratuitos de asistencia lingüística. Lilian al [phone].    We comply with applicable federal civil rights laws and [place] laws. We do not discriminate on the basis of race, color, national origin, age, disability sex, sexual orientation or gender identity.            Thank you!     [facility] you [facility]  for your [facility]. Our goal is always to provide you with excellent care. Hearing back from our patients is one way we can continue to improve our services. Please take a few minutes to complete the written survey that you may receive in the mail after your visit with us. Thank you!             Your Updated Medication List - Protect others around you: Learn how to safely use, store and throw away your medicines at www.disposemymeds.org.          This list is accurate as of: 6/21/17  3:11 PM.  Always use your most recent med list.                   Brand Name Dispense Instructions for use Diagnosis    busPIRone 5 MG tablet    BUSPAR    150 tablet    Start at 5 mg twice daily for 3 days, then 7.5 mg (1.5 tabs) twice daily for 3 days, then 10 mg (2 tabs) twice daily for 3 days, then 12.5 mg (2.5 tabs) twice daily for 3 days, then 15 mg (3 tabs) twice daily and stay at that dose    KIMANI (generalized anxiety disorder)       levothyroxine 88 MCG tablet    SYNTHROID/LEVOTHROID    90 tablet    Take 1 tablet (88 mcg) by mouth daily    Hypothyroidism due to Hashimoto's thyroiditis       ondansetron 8 MG tablet    ZOFRAN    9 tablet    Take 1 tablet (8 mg) by mouth every 8 hours as needed for nausea    Nausea       prochlorperazine 10 MG tablet    COMPAZINE     Take 10 mg by mouth every 6 hours as needed for nausea or vomiting        triamcinolone 0.5 % cream    KENALOG    30 g    Apply sparingly to affected area three times daily.    Atopic dermatitis,  unspecified type

## 2017-06-21 NOTE — NURSING NOTE
"Chief Complaint   Patient presents with     Results       Initial BP (!) 158/98 (Cuff Size: Adult Large)  Pulse 68  Temp 97.7  F (36.5  C) (Oral)  Ht 5' 11.75\" (1.822 m)  Wt 231 lb (104.8 kg)  SpO2 97%  BMI 31.55 kg/m2 Estimated body mass index is 31.55 kg/(m^2) as calculated from the following:    Height as of this encounter: 5' 11.75\" (1.822 m).    Weight as of this encounter: 231 lb (104.8 kg).  Medication Reconciliation: complete   Haydee RANGEL CMA (AAMA)      "

## 2017-06-27 LAB — HEMOCCULT STL QL IA: NEGATIVE

## 2017-06-27 PROCEDURE — 82274 ASSAY TEST FOR BLOOD FECAL: CPT | Performed by: FAMILY MEDICINE

## 2017-06-28 DIAGNOSIS — Z12.11 SCREENING FOR COLON CANCER: ICD-10-CM

## 2017-06-29 ENCOUNTER — TELEPHONE (OUTPATIENT)
Dept: FAMILY MEDICINE | Facility: CLINIC | Age: 57
End: 2017-06-29

## 2017-06-29 DIAGNOSIS — R97.20 ELEVATED PROSTATE SPECIFIC ANTIGEN (PSA): ICD-10-CM

## 2017-06-29 DIAGNOSIS — Z12.11 SCREEN FOR COLON CANCER: Primary | ICD-10-CM

## 2017-06-29 NOTE — TELEPHONE ENCOUNTER
Reason for call:  referral    Patient called regarding (reason for call): would like referral for [place] [facility]  Additional comments: he would like a colonoscopy in July. Please let him know when it is in.     Phone number to reach patient:  Cell number on file:    Telephone Information:   Mobile [phone]       Best Time:  anytime    Can we leave a detailed message on this number?  YES

## 2017-06-29 NOTE — TELEPHONE ENCOUNTER
Referral teed up for GI.  Please review arin advise on the GI referral requested below.    Colette TROTTER RN, BSN

## 2017-06-29 NOTE — TELEPHONE ENCOUNTER
Patient calling to have the referral for a colonoscopy sent to [facility]. They can get him in sooner. Please fax to them.

## 2017-06-29 NOTE — TELEPHONE ENCOUNTER
Please let patient know that order for colonoscopy has been placed to [facility]  Also his PSA for prostate was elevated and I have put in a referral to urology; given him number to call.  Urology:  Your provider has referred you to: [facility]: [facility] [place] [phone]   [email or web address]    Please be aware that coverage of these services is subject to the terms and limitations of your health insurance plan.  Call member services at your health plan with any benefit or coverage questions.      Please bring the following to your appointment:    >>   Any x-rays, CTs or MRIs which have been performed.  Contact the facility where they were done to arrange for  prior to your scheduled appointment.  Any new CT, MRI or other procedures ordered by your specialist must be performed at a [place] facility or coordinated by your clinic's referral office.    >>   List of current medications   >>   This referral request   >>   Any documents/labs given to you for this referral

## 2017-06-30 NOTE — TELEPHONE ENCOUNTER
Detailed message left for patient with providers message as written below. Advised to call the RN hotline at [phone] with any questions.    Patricia Nicholas RN - BC

## 2017-06-30 NOTE — TELEPHONE ENCOUNTER
Patient called and left a voicemail on the RN hotline requesting a call back and would like to know if the referral to [place] [facility] has been placed and went through?  Patient can be reached at [phone] or [phone] and a detailed VM can be left on either one.     Colette TROTTER RN, BSN

## 2017-06-30 NOTE — TELEPHONE ENCOUNTER
Patient left message on voicemail. Attempted to call patient back, no answer, left message to call RN hotline number.    Patricia Nicholas RN - BC

## 2017-07-03 ENCOUNTER — TELEPHONE (OUTPATIENT)
Dept: FAMILY MEDICINE | Facility: CLINIC | Age: 57
End: 2017-07-03

## 2017-07-03 NOTE — TELEPHONE ENCOUNTER
Reason for Call:  Other call back    Detailed comments:  Patient calling. He is having some nausea. Please call to discuss.     Phone Number Patient can be reached at: Home number on file [phone] (home)    Best Time:  Any     Can we leave a detailed message on this number? YES    Call taken on 7/3/2017 at 3:58 PM by Renita Bryant

## 2017-07-03 NOTE — TELEPHONE ENCOUNTER
Patient left message on RN hotline. States he is having nausea and would like to know if this can be caused by an elevated PSA.    Patricia Nicholas, LEONELA - BC

## 2017-07-03 NOTE — TELEPHONE ENCOUNTER
Patient called and left a voicemail on the RN hotline on 6/30/17 at 5:32 PM retuning a call back.    Colette TROTTER, RN, BSN

## 2017-07-05 PROCEDURE — 82274 ASSAY TEST FOR BLOOD FECAL: CPT | Performed by: FAMILY MEDICINE

## 2017-07-05 NOTE — TELEPHONE ENCOUNTER
Patient is returning call regarding previous message and was transferred to the providers care team. Please call to advise. Thank you.

## 2017-07-05 NOTE — TELEPHONE ENCOUNTER
Patient notified of providers message as written.  Patient verbalized understanding, no further questions or concerns.    Britni Sow RN

## 2017-07-06 DIAGNOSIS — Z12.11 SCREEN FOR COLON CANCER: ICD-10-CM

## 2017-07-06 LAB — HEMOCCULT STL QL IA: NEGATIVE

## 2017-07-06 NOTE — LETTER
[address] [place] [address]    July 6, 2017    Adriel Elam  [address]  [place] [address]          Dear Adriel,  Your results are normal.  Enclosed is a copy of your results.     Results for orders placed or performed in visit on 07/06/17   Fecal colorectal cancer screen (FIT)   Result Value Ref Range    Occult Blood Scn FIT Negative NEG       If you have any questions or concerns, please call myself or my nurse at [phone].      Sincerely,        Henry Boyd PA-C /pb

## 2017-07-06 NOTE — TELEPHONE ENCOUNTER
Patient notified of providers message as written.  Patient verbalized understanding, patient is scheduled at [place] [facility].   Britni Sow RN

## 2017-07-11 ENCOUNTER — TRANSFERRED RECORDS (OUTPATIENT)
Dept: HEALTH INFORMATION MANAGEMENT | Facility: CLINIC | Age: 57
End: 2017-07-11

## 2017-07-12 ENCOUNTER — TELEPHONE (OUTPATIENT)
Dept: FAMILY MEDICINE | Facility: CLINIC | Age: 57
End: 2017-07-12

## 2017-07-12 DIAGNOSIS — F41.1 GAD (GENERALIZED ANXIETY DISORDER): Primary | ICD-10-CM

## 2017-07-12 RX ORDER — BUSPIRONE HYDROCHLORIDE 15 MG/1
15 TABLET ORAL 2 TIMES DAILY
Qty: 60 TABLET | Refills: 0 | Status: SHIPPED | OUTPATIENT
Start: 2017-07-12 | End: 2017-07-17

## 2017-07-12 NOTE — TELEPHONE ENCOUNTER
Reason for Call:  Other prescription    Detailed comments:  Patient calling. He needs a new rx of buspirone. Please call and let know when done.     Phone Number Patient can be reached at: Cell number on file:    Telephone Information:   Mobile [phone]       Best Time:  Any     Can we leave a detailed message on this number? YES    Call taken on 7/12/2017 at 1:54 PM by Renita Bryant

## 2017-07-12 NOTE — TELEPHONE ENCOUNTER
Medication is being filled for 1 time refill only due to:  Patient needs to be seen because he is due for one month follow up.   Nova Mandujano RN

## 2017-07-17 ENCOUNTER — OFFICE VISIT (OUTPATIENT)
Dept: FAMILY MEDICINE | Facility: CLINIC | Age: 57
End: 2017-07-17
Payer: COMMERCIAL

## 2017-07-17 VITALS
DIASTOLIC BLOOD PRESSURE: 88 MMHG | HEART RATE: 63 BPM | BODY MASS INDEX: 31.27 KG/M2 | TEMPERATURE: 97.8 F | SYSTOLIC BLOOD PRESSURE: 136 MMHG | WEIGHT: 229 LBS | OXYGEN SATURATION: 98 %

## 2017-07-17 DIAGNOSIS — Z11.59 NEED FOR HEPATITIS C SCREENING TEST: ICD-10-CM

## 2017-07-17 DIAGNOSIS — F41.1 GAD (GENERALIZED ANXIETY DISORDER): ICD-10-CM

## 2017-07-17 DIAGNOSIS — Z12.11 SCREEN FOR COLON CANCER: ICD-10-CM

## 2017-07-17 DIAGNOSIS — Z23 NEED FOR PROPHYLACTIC VACCINATION WITH TETANUS-DIPHTHERIA (TD): ICD-10-CM

## 2017-07-17 PROCEDURE — 99213 OFFICE O/P EST LOW 20 MIN: CPT | Performed by: FAMILY MEDICINE

## 2017-07-17 RX ORDER — BUSPIRONE HYDROCHLORIDE 15 MG/1
15 TABLET ORAL 2 TIMES DAILY
Qty: 180 TABLET | Refills: 1 | Status: SHIPPED | OUTPATIENT
Start: 2017-07-17 | End: 2017-10-16

## 2017-07-17 ASSESSMENT — ANXIETY QUESTIONNAIRES
3. WORRYING TOO MUCH ABOUT DIFFERENT THINGS: SEVERAL DAYS
GAD7 TOTAL SCORE: 7
7. FEELING AFRAID AS IF SOMETHING AWFUL MIGHT HAPPEN: NOT AT ALL
5. BEING SO RESTLESS THAT IT IS HARD TO SIT STILL: MORE THAN HALF THE DAYS
6. BECOMING EASILY ANNOYED OR IRRITABLE: NOT AT ALL
2. NOT BEING ABLE TO STOP OR CONTROL WORRYING: SEVERAL DAYS
IF YOU CHECKED OFF ANY PROBLEMS ON THIS QUESTIONNAIRE, HOW DIFFICULT HAVE THESE PROBLEMS MADE IT FOR YOU TO DO YOUR WORK, TAKE CARE OF THINGS AT HOME, OR GET ALONG WITH OTHER PEOPLE: NOT DIFFICULT AT ALL
1. FEELING NERVOUS, ANXIOUS, OR ON EDGE: MORE THAN HALF THE DAYS

## 2017-07-17 ASSESSMENT — PATIENT HEALTH QUESTIONNAIRE - PHQ9: 5. POOR APPETITE OR OVEREATING: SEVERAL DAYS

## 2017-07-17 NOTE — PROGRESS NOTES
SUBJECTIVE:                                                    Adriel Elam is a 57 year old male who presents to clinic today for the following health issues:    Anxiety Follow-Up    Status since last visit: Improved, medication seems to wear off too soon     Other associated symptoms:None    Complicating factors:   Significant life event: Yes-Health issues Mother     Current substance abuse: None  Depression symptoms: Yes  KIMANI-7 SCORE 6/13/2017   Total Score 8     GAD7      Amount of exercise or physical activity: None, due to mothers health issue    Problems taking medications regularly: No    Medication side effects: none    Diet: regular (no restrictions)    Problem list and histories reviewed & adjusted, as indicated.  Additional history: as documented    Patient Active Problem List   Diagnosis     Hypothyroidism     No past surgical history on file.    Social History   Substance Use Topics     Smoking status: Never Smoker     Smokeless tobacco: Never Used     Alcohol use 0.0 oz/week     0 Standard drinks or equivalent per week      Comment: Rare      Family History   Problem Relation Age of Onset     CANCER No family hx of      DIABETES No family hx of      Hypertension No family hx of      CEREBROVASCULAR DISEASE No family hx of      Macular Degeneration No family hx of      Glaucoma No family hx of      Thyroid Disease No family hx of            Reviewed and updated as needed this visit by clinical staff  Tobacco  Allergies  Meds       Reviewed and updated as needed this visit by Provider         ROS:  Constitutional, HEENT, cardiovascular, pulmonary, gi and gu systems are negative, except as otherwise noted.    OBJECTIVE:     /88  Pulse 63  Temp 97.8  F (36.6  C) (Oral)  Wt 229 lb (103.9 kg)  SpO2 98%  BMI 31.27 kg/m2  Body mass index is 31.27 kg/(m^2).  GENERAL: healthy, alert and no distress  NECK: no adenopathy and thyroid normal to palpation  RESP: lungs clear to auscultation - no  rales, rhonchi or wheezes  CV: regular rate and rhythm, no murmur, click or rub, no peripheral edema  ABDOMEN: soft, nontender, no masses and bowel sounds normal  MS: no gross musculoskeletal defects noted, no edema  PSYCH: Alert, speech and affect normal  Diagnostic Test Results:  none     ASSESSMENT/PLAN:     (F41.1) KIMANI (generalized anxiety disorder)  Comment: Overall medication helping with his anxiety. Discussed timing; take close to every 12 hours instead of 4 AM and the 7 PM, will take at about 4 PM. Recheck in 3 months.  Plan: busPIRone (BUSPAR) 15 MG tablet          Follow up in 3 months or sooner with concerns    Luis E Gill MD  [facility]

## 2017-07-17 NOTE — NURSING NOTE
"Chief Complaint   Patient presents with     Anxiety       Initial /88  Pulse 63  Temp 97.8  F (36.6  C) (Oral)  Wt 229 lb (103.9 kg)  SpO2 98%  BMI 31.27 kg/m2 Estimated body mass index is 31.27 kg/(m^2) as calculated from the following:    Height as of 6/21/17: 5' 11.75\" (1.822 m).    Weight as of this encounter: 229 lb (103.9 kg).  Medication Reconciliation: complete    "

## 2017-07-17 NOTE — PATIENT INSTRUCTIONS
[facility]    If you have any questions regarding to your visit please contact your care team:       Team Purple:   Clinic Hours Telephone Number   Dr. Chacha Antony     7am-7pm  Monday - Thursday   7am-5pm  Fridays  [phone]  (Appointment scheduling available 24/7)    Questions about your Visit?   Team Line:  [phone]   Urgent Care - [place] and [facility] - 11am-9pm Monday-Friday Saturday-Sunday- 9am-5pm   [place] - 5pm-9pm Monday-Friday Saturday-Sunday- 9am-5pm  [phone] - [facility]  [phone] - [place]       What options do I have for visits at the clinic other than the traditional office visit?  To expand how we care for you, many of our providers are utilizing electronic visits (e-visits) and telephone visits, when medically appropriate, for interactions with their patients rather than a visit in the clinic.   We also offer nurse visits for many medical concerns. Just like any other service, we will bill your insurance company for this type of visit based on time spent on the phone with your provider. Not all insurance companies cover these visits. Please check with your medical insurance if this type of visit is covered. You will be responsible for any charges that are not paid by your insurance.      E-visits via Tal[employer]:  generally incur a $35.00 fee.  Telephone visits:  Time spent on the phone: *charged based on time that is spent on the phone in increments of 10 minutes. Estimated cost:   5-10 mins $30.00   11-20 mins. $59.00   21-30 mins. $85.00     Use [employer] Tier[employer] Technologyt (secure email communication and access to your chart) to send your primary care provider a message or make an appointment. Ask someone on your Team how to sign up for [employer].  For a Price Quote for your services, please call our Consumer Price Line at [phone].  As always, Thank you for trusting us with your health care needs!    Kathy Beyer MA

## 2017-07-17 NOTE — MR AVS SNAPSHOT
After Visit Summary   7/17/2017    Adriel Elam    MRN: [id number]           Patient Information     Date Of Birth          1960        Visit Information        Provider Department      7/17/2017 5:20 PM Luis E Gill MD [facility]        Today's Diagnoses     KIMANI (generalized anxiety disorder)        Screen for colon cancer        Need for hepatitis C screening test        Need for prophylactic vaccination with tetanus-diphtheria (TD)          Care Instructions    [facility]    If you have any questions regarding to your visit please contact your care team:       Team Purple:   Clinic Hours Telephone Number   Dr. Chacha Antony     7am-7pm  Monday - Thursday   7am-5pm  Fridays  [phone]  (Appointment scheduling available 24/7)    Questions about your Visit?   Team Line:  [phone]   [facility] [place] [facility] 11am-9pm Monday-Friday Saturday-Sunday- 9am-5pm   [place] - 5pm-9pm Monday-Friday Saturday-Sunday- 9am-5pm  [phone] - [facility]  [phone] - [place]       What options do I have for visits at the clinic other than the traditional office visit?  To expand how we care for you, many of our providers are utilizing electronic visits (e-visits) and telephone visits, when medically appropriate, for interactions with their patients rather than a visit in the clinic.   We also offer nurse visits for many medical concerns. Just like any other service, we will bill your insurance company for this type of visit based on time spent on the phone with your provider. Not all insurance companies cover these visits. Please check with your medical insurance if this type of visit is covered. You will be responsible for any charges that are not paid by your insurance.      E-visits via Contorion:  generally incur a $35.00 fee.  Telephone visits:  Time spent on the phone: *charged based on  "time that is spent on the phone in increments of 10 minutes. Estimated cost:   5-10 mins $30.00   11-20 mins. $59.00   21-30 mins. $85.00     Use aSmallWorldhart (secure email communication and access to your chart) to send your primary care provider a message or make an appointment. Ask someone on your Team how to sign up for LGL/LatinMediost.  For a Price Quote for your services, please call our TheraVid Line at [phone].  As always, Thank you for trusting us with your health care needs!    Kathy Beyer MA            Follow-ups after your visit        Follow-up notes from your care team     Return in about 3 months (around 10/17/2017).      Who to contact     If you have questions or need follow up information about today's clinic visit or your schedule please contact [facility] directly at [phone].  Normal or non-critical lab and imaging results will be communicated to you by MyChart, letter or phone within 4 business days after the clinic has received the results. If you do not hear from us within 7 days, please contact the clinic through aSmallWorldhart or phone. If you have a critical or abnormal lab result, we will notify you by phone as soon as possible.  Submit refill requests through Now In Store or call your pharmacy and they will forward the refill request to us. Please allow 3 business days for your refill to be completed.          Additional Information About Your Visit        aSmallWorldhart Information     LGL/LatinMediost lets you send messages to your doctor, view your test results, renew your prescriptions, schedule appointments and more. To sign up, go to [email or web address] . Click on \"Log in\" on the left side of the screen, which will take you to the Welcome page. Then click on \"Sign up Now\" on the right side of the page.     You will be asked to enter the access code listed below, as well as some personal information. Please follow the directions to create your username and password.     Your access code " is: [email or web address]  Expires: 9/10/2017  7:34 PM     Your access code will  in 90 days. If you need help or a new code, please call your [facility] clinic or [phone].        Care EveryWhere ID     This is your Care EveryWhere ID. This could be used by other organizations to access your [facility] medical records  [id number]        Your Vitals Were     Pulse Temperature Pulse Oximetry BMI (Body Mass Index)          63 97.8  F (36.6  C) (Oral) 98% 31.27 kg/m2         Blood Pressure from Last 3 Encounters:   17 136/88   17 156/90   17 164/80    Weight from Last 3 Encounters:   17 229 lb (103.9 kg)   17 231 lb (104.8 kg)   17 234 lb 9.6 oz (106.4 kg)              Today, you had the following     No orders found for display         Where to get your medicines      These medications were sent to [facility] [address] [place] [address] [address] [place] [address] [facility] [facility]  [address] [facility] [address]    Hours:  24-hours Phone:  [phone]     busPIRone 15 MG tablet          Primary Care Provider Office Phone # Fax #    Luis E Gill [phone]       [address] [address]        Equal Access to Services     [facility] LAKISHA AH: Hadii aad ku hadasho Soomaali, waaxda luqadaha, qaybta kaalmada adeegyada, brett snow. So [facility] [phone].    ATENCIÓN: Si habla español, tiene a smith disposición servicios gratuitos de asistencia lingüística. Llame al [phone].    We comply with applicable federal civil rights laws and [place] laws. We do not discriminate on the basis of race, color, national origin, age, disability sex, sexual orientation or gender identity.            Thank you!     Thank you for choosing [facility]  for your care. Our goal is always to provide you with excellent care. Hearing back from our patients is one way we can continue  to improve our services. Please take a few minutes to complete the written survey that you may receive in the mail after your visit with us. Thank you!             Your Updated Medication List - Protect others around you: Learn how to safely use, store and throw away your medicines at www.disposemymeds.org.          This list is accurate as of: 7/17/17  5:51 PM.  Always use your most recent med list.                   Brand Name Dispense Instructions for use Diagnosis    busPIRone 15 MG tablet    BUSPAR    180 tablet    Take 1 tablet (15 mg) by mouth 2 times daily    KIMANI (generalized anxiety disorder)       levothyroxine 88 MCG tablet    SYNTHROID/LEVOTHROID    90 tablet    Take 1 tablet (88 mcg) by mouth daily    Hypothyroidism due to Hashimoto's thyroiditis       ondansetron 8 MG tablet    ZOFRAN    9 tablet    Take 1 tablet (8 mg) by mouth every 8 hours as needed for nausea    Nausea       prochlorperazine 10 MG tablet    COMPAZINE     Take 10 mg by mouth every 6 hours as needed for nausea or vomiting        triamcinolone 0.5 % cream    KENALOG    30 g    Apply sparingly to affected area three times daily.    Atopic dermatitis, unspecified type

## 2017-07-18 ENCOUNTER — TRANSFERRED RECORDS (OUTPATIENT)
Dept: HEALTH INFORMATION MANAGEMENT | Facility: CLINIC | Age: 57
End: 2017-07-18

## 2017-07-18 ASSESSMENT — PATIENT HEALTH QUESTIONNAIRE - PHQ9: SUM OF ALL RESPONSES TO PHQ QUESTIONS 1-9: 3

## 2017-07-18 ASSESSMENT — ANXIETY QUESTIONNAIRES: GAD7 TOTAL SCORE: 7

## 2017-08-08 ENCOUNTER — TRANSFERRED RECORDS (OUTPATIENT)
Dept: HEALTH INFORMATION MANAGEMENT | Facility: CLINIC | Age: 57
End: 2017-08-08

## 2017-08-29 ENCOUNTER — TRANSFERRED RECORDS (OUTPATIENT)
Dept: HEALTH INFORMATION MANAGEMENT | Facility: CLINIC | Age: 57
End: 2017-08-29

## 2017-10-06 PROBLEM — C61 PROSTATE CANCER (H): Status: ACTIVE | Noted: 2017-10-06

## 2017-10-16 ENCOUNTER — TELEPHONE (OUTPATIENT)
Dept: FAMILY MEDICINE | Facility: CLINIC | Age: 57
End: 2017-10-16

## 2017-10-16 ENCOUNTER — OFFICE VISIT (OUTPATIENT)
Dept: FAMILY MEDICINE | Facility: CLINIC | Age: 57
End: 2017-10-16
Payer: COMMERCIAL

## 2017-10-16 DIAGNOSIS — E06.3 HYPOTHYROIDISM DUE TO HASHIMOTO'S THYROIDITIS: ICD-10-CM

## 2017-10-16 DIAGNOSIS — F41.1 GAD (GENERALIZED ANXIETY DISORDER): ICD-10-CM

## 2017-10-16 PROCEDURE — 99442 ZZC PHYSICIAN TELEPHONE EVALUATION 11-20 MIN: CPT | Performed by: FAMILY MEDICINE

## 2017-10-16 RX ORDER — LEVOTHYROXINE SODIUM 88 UG/1
88 TABLET ORAL DAILY
Qty: 90 TABLET | Refills: 1 | Status: SHIPPED | OUTPATIENT
Start: 2017-10-16 | End: 2018-04-13

## 2017-10-16 RX ORDER — BUSPIRONE HYDROCHLORIDE 15 MG/1
15 TABLET ORAL 2 TIMES DAILY
Qty: 180 TABLET | Refills: 1 | Status: SHIPPED | OUTPATIENT
Start: 2017-10-16 | End: 2018-04-13 | Stop reason: DRUGHIGH

## 2017-10-16 NOTE — TELEPHONE ENCOUNTER
Patient notified of providers message as written.   Rescheduled patient for a phone visit today. Advised patient provider will call between 5 pm -7 pm tonight.  Patient verbalized understanding and has no further questions or concerns.  Patricia Nicholas RN - BC

## 2017-10-16 NOTE — MR AVS SNAPSHOT
"              After Visit Summary   10/16/2017    Adriel Elam    MRN: [id number]           Patient Information     Date Of Birth          1960        Visit Information        Provider Department      10/16/2017 1:00 PM Luis E Gill MD [facility]        Today's Diagnoses     KIMANI (generalized anxiety disorder)        Hypothyroidism due to Hashimoto's thyroiditis           Follow-ups after your visit        Your next 10 appointments already scheduled     Oct 20, 2017  9:15 AM CDT   New Visit with David Jacobson MD   [facility] ([facility])    [address] [place] [address]   [phone]              Who to contact     If you have questions or need follow up information about today's clinic visit or your schedule please contact [facility] directly at [phone].  Normal or non-critical lab and imaging results will be communicated to you by MyChart, letter or phone within 4 business days after the clinic has received the results. If you do not hear from us within 7 days, please contact the clinic through MyChart or phone. If you have a critical or abnormal lab result, we will notify you by phone as soon as possible.  Submit refill requests through Screen Tonic or call your pharmacy and they will forward the refill request to us. Please allow 3 business days for your refill to be completed.          Additional Information About Your Visit        MyChart Information     Screen Tonic lets you send messages to your doctor, view your test results, renew your prescriptions, schedule appointments and more. To sign up, go to [email or web address] . Click on \"Log in\" on the left side of the screen, which will take you to the Welcome page. Then click on \"Sign up Now\" on the right side of the page.     You will be asked to enter the access code listed below, as well as some personal information. Please follow the directions to create your " username and password.     Your access code is: [email or web address]  Expires: 2018  8:47 PM     Your access code will  in 90 days. If you need help or a new code, please call your [facility] clinic or [phone].        Care EveryWhere ID     This is your Care EveryWhere ID. This could be used by other organizations to access your [facility] medical records  [id number]         Blood Pressure from Last 3 Encounters:   17 136/88   17 156/90   17 164/80    Weight from Last 3 Encounters:   17 229 lb (103.9 kg)   17 231 lb (104.8 kg)   17 234 lb 9.6 oz (106.4 kg)              Today, you had the following     No orders found for display         Where to get your medicines      These medications were sent to [facility] [address] [place] [address] [address] [place] [address]  [address] [facility] [address]    Hours:  24-hours Phone:  [phone]     busPIRone 15 MG tablet    levothyroxine 88 MCG tablet          Primary Care Provider Office Phone # Fax #    Luis E Gill [phone] [address] [facility] [address]        Equal Access to Services     [facility] AH: Hadii aad ku hadasho Soomaali, waaxda luqadaha, qaybta kaalmada adeegyada, brett bridges . So [facility] [phone].    ATENCIÓN: Si habla español, tiene a smith disposición servicios gratuitos de asistencia lingüística. Llame al [phone].    We comply with applicable federal civil rights laws and [place] laws. We do not discriminate on the basis of race, color, national origin, age, disability, sex, sexual orientation, or gender identity.            Thank you!     Thank you for choosing [facility]  for your care. Our goal is always to provide you with excellent care. Hearing back from our patients is one way we can continue to improve our services. Please take a few minutes to complete the written  survey that you may receive in the mail after your visit with us. Thank you!             Your Updated Medication List - Protect others around you: Learn how to safely use, store and throw away your medicines at www.disposemymeds.org.          This list is accurate as of: 10/16/17  8:47 PM.  Always use your most recent med list.                   Brand Name Dispense Instructions for use Diagnosis    busPIRone 15 MG tablet    BUSPAR    180 tablet    Take 1 tablet (15 mg) by mouth 2 times daily    KIMANI (generalized anxiety disorder)       levothyroxine 88 MCG tablet    SYNTHROID/LEVOTHROID    90 tablet    Take 1 tablet (88 mcg) by mouth daily    Hypothyroidism due to Hashimoto's thyroiditis       ondansetron 8 MG tablet    ZOFRAN    9 tablet    Take 1 tablet (8 mg) by mouth every 8 hours as needed for nausea    Nausea       prochlorperazine 10 MG tablet    COMPAZINE     Take 10 mg by mouth every 6 hours as needed for nausea or vomiting        triamcinolone 0.5 % cream    KENALOG    30 g    Apply sparingly to affected area three times daily.    Atopic dermatitis, unspecified type

## 2017-10-16 NOTE — TELEPHONE ENCOUNTER
Reason for Call:  Other call back    Detailed comments: Patient would like to know if he could have an telephone visit today instead of coming into the clinic, please call to discuss further as the patient is scheduled to been seen @ 5:20pm with PCP,       Phone Number Patient can be reached at: Cell number on file:    Telephone Information:   Mobile [phone]       Best Time: today,     Can we leave a detailed message on this number? YES    Call taken on 10/16/2017 at 8:09 AM by Gladis Espinoza

## 2017-10-16 NOTE — PROGRESS NOTES
"  Adriel Elam is a 57 year old male who is being evaluated via a telephone visit.      The patient has been notified of following:     \"This telephone visit will be conducted via a call between you and your physician/provider. We have found that certain health care needs can be provided without the need for a physical exam.  This service lets us provide the care you need with a short phone conversation.  If a prescription is necessary we can send it directly to your pharmacy.  If lab work is needed we can place an order for that and you can then stop by our lab to have the test done at a later time.    We will bill your insurance company for this service.  Please check with your medical insurance if this type of visit is covered. You may be responsible for the cost of this type of visit if insurance coverage is denied.  The typical cost is $30 (10min), $59 (11-20min) and $85 (21-30min).  Most often these visits are shorter than 10 minutes.    If during the course of the call the physician/provider feels a telephone visit is not appropriate, you will not be charged for this service.\"       Consent has been obtained for this service by 2 care team members: yes. See the scanned image in the medical record.    Adriel Elam complains of  Recheck Medication (busPIRone (BUSPAR) 15 MG tablet)      I have reviewed and updated the patient's Past Medical History, Social History, Family History and Medication List.    ALLERGIES  Review of patient's allergies indicates no known allergies.     (MA signature)    Additional provider notes:     Anxiety Follow-Up    Status since last visit: Improved     Other associated symptoms:None    Complicating factors:   Significant life event: No   Current substance abuse: None    Adriel is calling to up date me concerning anxiety.  He states everything is going well with Buspar 15 mg twice daily but reports that he gets anxious in the morning when he gets up at about 4.30 AM and will take his " medication at about 5 AM and will feel better shortly afterwards. Throughout the day he is feeling fine when gets back home will take evening dose at about 7 pm.    He denies any changes in his work or at home. He thinks anxiety hits him in the morning due to some dreams he has in the night and getting ready for the day.  Also reports trhat he is getting low on his Levothyroxine and would like a refill. REviewed TSH from 6/13/2017 was 3.62.     Assessment/Plan:  (F41.1) KIMANI (generalized anxiety disorder)  Comment: Buspar helping with anxiety except towards morning. Discussed delaying the evening dose till bedtime 9-10 pm see if that will stretch effect until morning. Refill sent to the pharmacy.  Plan: busPIRone (BUSPAR) 15 MG tablet    (E03.8,  E06.3) Hypothyroidism due to Hashimoto's thyroiditis  Comment: Stable. Refill medication  Plan: levothyroxine (SYNTHROID/LEVOTHROID) 88 MCG         Tablet.    He will follow up in 3 month or sooner with concerns.    I have reviewed the note as documented above.  This accurately captures the substance of my conversation with the patient    Total time of call between patient and provider was 12 minutes

## 2017-10-20 ENCOUNTER — OFFICE VISIT (OUTPATIENT)
Dept: UROLOGY | Facility: CLINIC | Age: 57
End: 2017-10-20
Payer: COMMERCIAL

## 2017-10-20 VITALS — SYSTOLIC BLOOD PRESSURE: 138 MMHG | RESPIRATION RATE: 12 BRPM | DIASTOLIC BLOOD PRESSURE: 88 MMHG | HEART RATE: 70 BPM

## 2017-10-20 DIAGNOSIS — C61 PROSTATE CANCER (H): Primary | ICD-10-CM

## 2017-10-20 PROCEDURE — 99204 OFFICE O/P NEW MOD 45 MIN: CPT | Performed by: UROLOGY

## 2017-10-20 NOTE — NURSING NOTE
"Chief Complaint   Patient presents with     Consult       Initial /88 (BP Location: Right arm, Patient Position: Chair, Cuff Size: Adult Regular)  Pulse 70  Resp 12 Estimated body mass index is 31.27 kg/(m^2) as calculated from the following:    Height as of 6/21/17: 1.822 m (5' 11.75\").    Weight as of 7/17/17: 103.9 kg (229 lb).  Medication Reconciliation: complete     "

## 2017-10-20 NOTE — MR AVS SNAPSHOT
After Visit Summary   10/20/2017    Adriel Elam    MRN: [id number]           Patient Information     Date Of Birth          1960        Visit Information        Provider Department      10/20/2017 9:15 AM David Jacobson MD [facility]        Today's Diagnoses     Prostate cancer (H)    -  1      Care Instructions      Please call our office if you have questions or need to report any information or to schedule your surgery, [phone].      Prostate Cancer: Radical Prostatectomy     The prostate, seminal vesicles, and a portion of the urethra are removed.   Radical prostatectomy is surgery to remove the entire prostate. It may be done if tests show that the cancer is confined to the prostate. Your surgeon will give you detailed instructions on getting ready for surgery. After surgery, you ll be told how to care for yourself at home as you recover. Be sure to ask any questions you have about the procedure and recovery.  Risks and possible complications  All surgeries have risks. The risks of this surgery include:    Blood clots    Excess bleeding    Hole (perforation) in the bowel    Infection    Loss of bladder control (incontinence)    Lung infection (pneumonia)    Trouble getting or keeping an erection (erectile dysfunction)    Trouble urinating  Getting ready for your surgery     The urethra is reattached to the bladder. A catheter is inserted to drain urine while you heal. A balloon holds the catheter in place.   Follow all instructions from your healthcare team. In addition:    Tell your healthcare provider about all medicines you take. This includes herbs and other supplements. It also includes any blood thinners, such as warfarin, clopidogrel, or daily aspirin. You may need to stop taking some or all of them before the surgery.    You may be told to use a laxative, enemas, or both before the surgery. This is to empty the colon and rectum of stool. Follow the  instructions you are given.    Don t eat or drink after midnight the night before surgery.  How the surgery is done  The surgery may be done through several small incisions in the abdomen. This is called laparoscopy. In many cases, a method called robotic-assisted laparoscopy is used. The robotic system helps during the surgery. It gives a 3-D view of inside the body. It also assists the surgeon s hand movements.   In some cases, the surgery may be performed through a larger incision in the abdomen. This is called the retropubic approach. Or surgery may be done through an incision behind the scrotum. This is called the perineal approach.  During the surgery:    The surgeon may remove and check the lymph nodes near the prostate. This is to see if cancer has spread. If the cancer has spread, the surgeon may decide not to remove the prostate.    The prostate, seminal vesicles, and a portion of urethra will then be removed.    Nerve-sparing methods may be used to try to preserve erectile function.  After surgery  You will have a catheter in place to drain urine from your bladder. Urine will flow through the catheter into a sterile bag. The urine may be bloody or cloudy at first. You may go home in 1 to 3 days.  Recovering at home  You ll be given medicines to control pain. The catheter will be left in place when you go home. You ll be given instructions on how to manage it.  Follow-up care  The catheter and stitches will be removed at a follow-up visit. This is often 1 to 2 weeks after surgery. Bladder control often takes a few weeks to several months to return. Improvement can continue for up to a year.  When to call your healthcare provider  Call your healthcare provider right away if you have any of the following:    Chills    Fever of 100.4 F (38 C) or higher, or as directed by your healthcare provider    Fluid leaking from your incision    Redness or pain in the incision that gets worse    Swelling of your leg or  ankle    Trouble urinating after the catheter has been removed    Urine not draining from the catheter   Date Last Reviewed: 5/1/2017 2000-2017 [employer]. [address] [place] [address], [place] [address]. All rights reserved. This information is not intended as a substitute for professional medical care. Always follow your healthcare professional's instructions.        Discharge Instructions for Radical Prostatectomy  You had a procedure called radical prostatectomy (removal of the entire prostate and surrounding tissues). This sheet will help you know what to do following surgery.  Activity    Don t drive until your healthcare provider says it s OK. This is usually after your catheter is removed and you are no longer taking pain medicine.    For the first 2 weeks after surgery, limit physical activity. This will allow your body to rest and heal.    Talk to your healthcare provider before going back to your normal activity level.    Don t lift anything heavier than 10 pounds until your healthcare provider says it s OK.    Avoid long car rides.    Avoid climbing stairs and strenuous exercise. Don t mow the lawn or use a vacuum .    Take naps if you feel tired.  Home care    Avoid constipation:    Eat fruits, vegetables, and whole grains.    Unless directed otherwise, drink 6 glasses to 8 glasses of water a day (enough to keep your urine light colored). This will also help keep a healthy flow of urine.    Use a laxative or a stool softener if your healthcare provider says it s OK.    Take care of your catheter. Ask for an information sheet and training before leaving the hospital:    Keep the catheter well secured.    Use either leg bags or external (straight drainage) bags, or both.    Empty your bag when it s half full. You may notice some blood in the bag. This is normal after surgery and while the catheter is in place.    Use plain soap and water to wash the catheter and the head of your penis  daily, or more often if needed.    Return to your normal diet.    Shower as usual.    Be sure to finish the antibiotics that your doctor prescribed.    Be sure to take pain medicine if needed and as prescribed.    Consider wearing sweat pants while you have the catheter. They may be more comfortable than other pants.  Follow-up  Make a follow-up appointment as directed.  When to call your healthcare provider  Call your healthcare provider right away if you have any of the following:    Fever above 100.4 F (38 C) or shaking chills    Heavy bleeding, clots, or bright red blood from the catheter    Catheter that falls out or stops draining    Foul-smelling discharge from your catheter    Redness, swelling, warmth, or pain at your incision site    Drainage, pus, or bleeding from your incision    Trouble breathing    Hives or rash    Nausea and vomiting    Diarrhea   Date Last Reviewed: 1/1/2017 2000-2017 The "[employer]. [address] [place] [address]. All rights reserved. This information is not intended as a substitute for professional medical care. Always follow your healthcare professional's instructions.                Follow-ups after your visit        Who to contact     If you have questions or need follow up information about today's clinic visit or your schedule please contact [facility] directly at [phone].  Normal or non-critical lab and imaging results will be communicated to you by MyChart, letter or phone within 4 business days after the clinic has received the results. If you do not hear from us within 7 days, please contact the clinic through MyChart or phone. If you have a critical or abnormal lab result, we will notify you by phone as soon as possible.  Submit refill requests through [employer] or call your pharmacy and they will forward the refill request to us. Please allow 3 business days for your refill to be completed.          Additional Information About  "Your Visit        MyChart Information     [facility] [employer] [employer] lets you send messages to your doctor, view your test results, renew your prescriptions, schedule appointments and more. To sign up, go to [email or web address] . Click on \"Log in\" on the left side of the screen, which will take you to the Welcome page. Then click on \"Sign up Now\" on the right side of the page.     You will be asked to enter the access code listed below, as well as some personal information. Please follow the directions to create your username and password.     Your access code is: [email or web address]  Expires: 2018  8:47 PM     Your access code will  in 90 days. If you need help or a new code, please call your [facility] clinic or [phone].        Care EveryWhere ID     This is your Care EveryWhere ID. This could be used by other organizations to access your [facility] medical records  [id number]        Your Vitals Were     Pulse Respirations                70 12           Blood Pressure from Last 3 Encounters:   10/20/17 138/88   17 136/88   17 156/90    Weight from Last 3 Encounters:   17 103.9 kg (229 lb)   17 104.8 kg (231 lb)   17 106.4 kg (234 lb 9.6 oz)              Today, you had the following     No orders found for display       Primary Care Provider Office Phone # Fax #    Luis E Gill [phone]        [facility] [id number]        Equal Access to Services     [facility]: Hadii timmy ku hadasho Soomaali, waaxda luqadaha, qaybta kaalmajudy smith, brett bridges . So [facility] [phone].    ATENCIÓN: Si habla español, tiene a smith disposición servicios gratuitos de asistencia lingüística. Llame al [phone].    We comply with applicable federal civil rights laws and [place] laws. We do not discriminate on the basis of race, color, national origin, age, disability, sex, sexual orientation, or gender identity.            Thank you! "     Thank you for choosing [facility]  for your care. Our goal is always to provide you with excellent care. Hearing back from our patients is one way we can continue to improve our services. Please take a few minutes to complete the written survey that you may receive in the mail after your visit with us. Thank you!             Your Updated Medication List - Protect others around you: Learn how to safely use, store and throw away your medicines at www.disposemymeds.org.          This list is accurate as of: 10/20/17  9:48 AM.  Always use your most recent med list.                   Brand Name Dispense Instructions for use Diagnosis    busPIRone 15 MG tablet    BUSPAR    180 tablet    Take 1 tablet (15 mg) by mouth 2 times daily    KIMANI (generalized anxiety disorder)       levothyroxine 88 MCG tablet    SYNTHROID/LEVOTHROID    90 tablet    Take 1 tablet (88 mcg) by mouth daily    Hypothyroidism due to Hashimoto's thyroiditis       ondansetron 8 MG tablet    ZOFRAN    9 tablet    Take 1 tablet (8 mg) by mouth every 8 hours as needed for nausea    Nausea       prochlorperazine 10 MG tablet    COMPAZINE     Take 10 mg by mouth every 6 hours as needed for nausea or vomiting        triamcinolone 0.5 % cream    KENALOG    30 g    Apply sparingly to affected area three times daily.    Atopic dermatitis, unspecified type

## 2017-10-20 NOTE — PROGRESS NOTES
S: See dictated note   Current Outpatient Prescriptions   Medication Sig Dispense Refill     busPIRone (BUSPAR) 15 MG tablet Take 1 tablet (15 mg) by mouth 2 times daily 180 tablet 1     levothyroxine (SYNTHROID/LEVOTHROID) 88 MCG tablet Take 1 tablet (88 mcg) by mouth daily 90 tablet 1     prochlorperazine (COMPAZINE) 10 MG tablet Take 10 mg by mouth every 6 hours as needed for nausea or vomiting       triamcinolone (KENALOG) 0.5 % cream Apply sparingly to affected area three times daily. 30 g 1     ondansetron (ZOFRAN) 8 MG tablet Take 1 tablet (8 mg) by mouth every 8 hours as needed for nausea 9 tablet 0     No Known Allergies  Past Medical History:   Diagnosis Date     Hypothyroidism      No past surgical history on file.   Family History   Problem Relation Age of Onset     CANCER No family hx of      DIABETES No family hx of      Hypertension No family hx of      CEREBROVASCULAR DISEASE No family hx of      Macular Degeneration No family hx of      Glaucoma No family hx of      Thyroid Disease No family hx of      Social History     Social History     Marital status: Single     Spouse name: N/A     Number of children: N/A     Years of education: N/A     Social History Main Topics     Smoking status: Never Smoker     Smokeless tobacco: Never Used     Alcohol use 0.0 oz/week     0 Standard drinks or equivalent per week      Comment: Rare      Drug use: No     Sexual activity: No     Other Topics Concern     Parent/Sibling W/ Cabg, Mi Or Angioplasty Before 65f 55m? No     Social History Narrative       REVIEW OF SYSTEMS  =================  C: NEGATIVE for fever, chills, change in weight  I: NEGATIVE for worrisome rashes, moles or lesions  E/M: NEGATIVE for ear, mouth and throat problems  R: NEGATIVE for significant cough or SHORTNESS OF BREATH  CV:  NEGATIVE for chest pain, palpitations or peripheral edema  GI: NEGATIVE for nausea, abdominal pain, heartburn, or change in bowel habits  NEURO: NEGATIVE  numbness/weakness  : see HPI  PSYCH: NEGATIVE depression/anxiety  LYmph: no new enlarged lymph nodes  Ortho: no new trauma/movements      Physical Exam:  /88 (BP Location: Right arm, Patient Position: Chair, Cuff Size: Adult Regular)  Pulse 70  Resp 12   Patient is pleasant, in no acute distress, good general condition.  HEENT:  Normalcephalic, atraumatic  Lung: no evidence of respiratory distress    Abdomen: Soft, nondistended, non tender. No masses. No rebound or guarding.   Exam: penis no d/c. Prostate 30 gm smooth no nodule.  No cva tenderness  Skin: Warm and dry.  No redness.  Neuro: grossly normal  Psych normal mood and affect  Musculoskeletal  moving all extremities    Assessment/Plan:   See dictated note

## 2017-10-20 NOTE — PATIENT INSTRUCTIONS
Please call our office if you have questions or need to report any information or to schedule your surgery, [phone].      Prostate Cancer: Radical Prostatectomy     The prostate, seminal vesicles, and a portion of the urethra are removed.   Radical prostatectomy is surgery to remove the entire prostate. It may be done if tests show that the cancer is confined to the prostate. Your surgeon will give you detailed instructions on getting ready for surgery. After surgery, you ll be told how to care for yourself at home as you recover. Be sure to ask any questions you have about the procedure and recovery.  Risks and possible complications  All surgeries have risks. The risks of this surgery include:    Blood clots    Excess bleeding    Hole (perforation) in the bowel    Infection    Loss of bladder control (incontinence)    Lung infection (pneumonia)    Trouble getting or keeping an erection (erectile dysfunction)    Trouble urinating  Getting ready for your surgery     The urethra is reattached to the bladder. A catheter is inserted to drain urine while you heal. A balloon holds the catheter in place.   Follow all instructions from your healthcare team. In addition:    Tell your healthcare provider about all medicines you take. This includes herbs and other supplements. It also includes any blood thinners, such as warfarin, clopidogrel, or daily aspirin. You may need to stop taking some or all of them before the surgery.    You may be told to use a laxative, enemas, or both before the surgery. This is to empty the colon and rectum of stool. Follow the instructions you are given.    Don t eat or drink after midnight the night before surgery.  How the surgery is done  The surgery may be done through several small incisions in the abdomen. This is called laparoscopy. In many cases, a method called robotic-assisted laparoscopy is used. The robotic system helps during the surgery. It gives a 3-D view of inside the  body. It also assists the surgeon s hand movements.   In some cases, the surgery may be performed through a larger incision in the abdomen. This is called the retropubic approach. Or surgery may be done through an incision behind the scrotum. This is called the perineal approach.  During the surgery:    The surgeon may remove and check the lymph nodes near the prostate. This is to see if cancer has spread. If the cancer has spread, the surgeon may decide not to remove the prostate.    The prostate, seminal vesicles, and a portion of urethra will then be removed.    Nerve-sparing methods may be used to try to preserve erectile function.  After surgery  You will have a catheter in place to drain urine from your bladder. Urine will flow through the catheter into a sterile bag. The urine may be bloody or cloudy at first. You may go home in 1 to 3 days.  Recovering at home  You ll be given medicines to control pain. The catheter will be left in place when you go home. You ll be given instructions on how to manage it.  Follow-up care  The catheter and stitches will be removed at a follow-up visit. This is often 1 to 2 weeks after surgery. Bladder control often takes a few weeks to several months to return. Improvement can continue for up to a year.  When to call your healthcare provider  Call your healthcare provider right away if you have any of the following:    Chills    Fever of 100.4 F (38 C) or higher, or as directed by your healthcare provider    Fluid leaking from your incision    Redness or pain in the incision that gets worse    Swelling of your leg or ankle    Trouble urinating after the catheter has been removed    Urine not draining from the catheter   Date Last Reviewed: 5/1/2017 2000-2017 [employer]. [address] [place] [address]. All rights reserved. This information is not intended as a substitute for professional medical care. Always follow your healthcare professional's  instructions.        Discharge Instructions for Radical Prostatectomy  You had a procedure called radical prostatectomy (removal of the entire prostate and surrounding tissues). This sheet will help you know what to do following surgery.  Activity    Don t drive until your healthcare provider says it s OK. This is usually after your catheter is removed and you are no longer taking pain medicine.    For the first 2 weeks after surgery, limit physical activity. This will allow your body to rest and heal.    Talk to your healthcare provider before going back to your normal activity level.    Don t lift anything heavier than 10 pounds until your healthcare provider says it s OK.    Avoid long car rides.    Avoid climbing stairs and strenuous exercise. Don t mow the lawn or use a vacuum .    Take naps if you feel tired.  Home care    Avoid constipation:    Eat fruits, vegetables, and whole grains.    Unless directed otherwise, drink 6 glasses to 8 glasses of water a day (enough to keep your urine light colored). This will also help keep a healthy flow of urine.    Use a laxative or a stool softener if your healthcare provider says it s OK.    Take care of your catheter. Ask for an information sheet and training before leaving the hospital:    Keep the catheter well secured.    Use either leg bags or external (straight drainage) bags, or both.    Empty your bag when it s half full. You may notice some blood in the bag. This is normal after surgery and while the catheter is in place.    Use plain soap and water to wash the catheter and the head of your penis daily, or more often if needed.    Return to your normal diet.    Shower as usual.    Be sure to finish the antibiotics that your doctor prescribed.    Be sure to take pain medicine if needed and as prescribed.    Consider wearing sweat pants while you have the catheter. They may be more comfortable than other pants.  Follow-up  Make a follow-up appointment as  directed.  When to call your healthcare provider  Call your healthcare provider right away if you have any of the following:    Fever above 100.4 F (38 C) or shaking chills    Heavy bleeding, clots, or bright red blood from the catheter    Catheter that falls out or stops draining    Foul-smelling discharge from your catheter    Redness, swelling, warmth, or pain at your incision site    Drainage, pus, or bleeding from your incision    Trouble breathing    Hives or rash    Nausea and vomiting    Diarrhea   Date Last Reviewed: 1/1/2017 2000-2017 The "[employer]. [address] [place] [address]. All rights reserved. This information is not intended as a substitute for professional medical care. Always follow your healthcare professional's instructions.

## 2017-10-20 NOTE — PROGRESS NOTES
SUBJECTIVE:  Adriel Robledo is a pleasant 57-year-old  male who was requested to be seen by Dr. Luis E Gill for consultation with regard to patient's prostate cancer.  The patient was seen a recently by [facility] with an elevated PSA.  His PSA was 6.89.  Given his significant family history of prostate cancer and elevated PSA, the patient had a prostate biopsy done.  Cystoscopy was done in August of this year and it did show that he has prostate cancer.  It is shown mainly on the left side of the prostate.  He has a Evita score of 3+3 for a total score of 6.  The cancer was found to range from 20-70% of the core of the prostate.  He has no problems with urination.  He has no problem with erections.  His prostate size is about 30 grams plus.  His uncles and father lived until the 80s.  He is healthy otherwise.      ASSESSMENT:  Pleasant 57-year-old gentleman with stage T1c group 1 prostate cancer.      RECOMMENDATION:  The natural progression of this type of prostate cancer was discussed with the patient at length.  Overall, this is a very slow growing cancer.  It takes a number of years for things to change.  Treatment options discussed at length which include but are not limited to observation, surgical treatments, radiation, hormonal therapy and cryotherapy.  The risks of erectile dysfunction and urinary incontinence with treatments were discussed.  The patient is interested in having surgery done.  Two types of surgery, robotic versus open surgery were discussed with the patient at length.  Overall, I told the patient the results are the same.  Surgery has an increased risk of bleeding.  Catheter usage and hospital stay discussed at length also.  The patient will call me in the near future to set up the surgery.         ZBIGNIEW TEMPLETON MD             D: 10/20/2017 10:08   T: 10/20/2017 10:48   MT: JANEEN      Name:     ADRIEL ROBLEDO   MRN:      [id number]        Account:      [id number]   :       1960           Visit Date:   10/20/2017      Document: [id number]       cc: Luis E Gill MD

## 2017-10-23 ENCOUNTER — TELEPHONE (OUTPATIENT)
Dept: UROLOGY | Facility: CLINIC | Age: 57
End: 2017-10-23

## 2017-10-23 NOTE — TELEPHONE ENCOUNTER
Surgery scheduled 11/27/2017 at [facility] at 12:30, arrive 11:00am. Precert sent to [facility]. Patient advised and instructed. He has surgical packet info and will do bowel prep day prior and get pre-op physical one week prior.  Haydee Loco CMA

## 2017-11-02 NOTE — PATIENT INSTRUCTIONS
Before Your Surgery      Call your surgeon if there is any change in your health. This includes signs of a cold or flu (such as a sore throat, runny nose, cough, rash or fever).    Do not smoke, drink alcohol or take over the counter medicine (unless your surgeon or primary care doctor tells you to) for the 24 hours before and after surgery.    If you take prescribed drugs: Follow your doctor s orders about which medicines to take and which to stop until after surgery.    Eating and drinking prior to surgery: follow the instructions from your surgeon    Take a shower or bath the night before surgery. Use the soap your surgeon gave you to gently clean your skin. If you do not have soap from your surgeon, use your regular soap. Do not shave or scrub the surgery site.  Wear clean pajamas and have clean sheets on your bed.   Before Your Surgery    Call your surgeon if there is any change in your health. This includes signs of a cold or flu (such as a sore throat, runny nose, cough, rash or fever).  Do not smoke, drink alcohol or take over the counter medicine (unless your surgeon or primary care doctor tells you to) for the 24 hours before and after surgery.  If you take prescribed drugs: Follow your doctor s orders about which medicines to take and which to stop until after surgery.  Eating and drinking prior to surgery: follow the instructions from your surgeon  Take a shower or bath the night before surgery. Use the soap your surgeon gave you to gently clean your skin. If you do not have soap from your surgeon, use your regular soap. Do not shave or scrub the surgery site.  Wear clean pajamas and have clean sheets on your bed.   Before Your Surgery    Call your surgeon if there is any change in your health. This includes signs of a cold or flu (such as a sore throat, runny nose, cough, rash or fever).  Do not smoke, drink alcohol or take over the counter medicine (unless your surgeon or primary care doctor tells  you to) for the 24 hours before and after surgery.  If you take prescribed drugs: Follow your doctor s orders about which medicines to take and which to stop until after surgery.  Eating and drinking prior to surgery: follow the instructions from your surgeon  Take a shower or bath the night before surgery. Use the soap your surgeon gave you to gently clean your skin. If you do not have soap from your surgeon, use your regular soap. Do not shave or scrub the surgery site.  Wear clean pajamas and have clean sheets on your bed.     [facility]    If you have any questions regarding to your visit please contact your care team:       Team Purple:   Clinic Hours Telephone Number   Dr. Chacha Jordan   7am-7pm  Monday - Thursday   7am-5pm  Fridays  [phone]  (Appointment scheduling available 24/7)    Questions about your Visit?   Team Line:  [phone]   Urgent Care - [place] and [facility] - 11am-9pm Monday-Friday Saturday-Sunday- 9am-5pm   [place] - 5pm-9pm Monday-Friday Saturday-Sunday- 9am-5pm  [phone] - [facility]  [phone] [facility]       What options do I have for visits at the clinic other than the traditional office visit?  To expand how we care for you, many of our providers are utilizing electronic visits (e-visits) and telephone visits, when medically appropriate, for interactions with their patients rather than a visit in the clinic.   We also offer nurse visits for many medical concerns. Just like any other service, we will bill your insurance company for this type of visit based on time spent on the phone with your provider. Not all insurance companies cover these visits. Please check with your medical insurance if this type of visit is covered. You will be responsible for any charges that are not paid by your insurance.      E-visits via enrich-in:  generally incur a $35.00 fee.  Telephone  visits:  Time spent on the phone: *charged based on time that is spent on the phone in increments of 10 minutes. Estimated cost:   5-10 mins $30.00   11-20 mins. $59.00   21-30 mins. $85.00     Use travelfoxt (secure email communication and access to your chart) to send your primary care provider a message or make an appointment. Ask someone on your Team how to sign up for Snippets.  For a Price Quote for your services, please call our [employer] Price Line at [phone].  As always, Thank you for trusting us with your health care needs!    Discharge by TANIA JACOME

## 2017-11-13 ENCOUNTER — OFFICE VISIT (OUTPATIENT)
Dept: FAMILY MEDICINE | Facility: CLINIC | Age: 57
End: 2017-11-13
Payer: COMMERCIAL

## 2017-11-13 VITALS
WEIGHT: 230 LBS | BODY MASS INDEX: 31.15 KG/M2 | TEMPERATURE: 99.1 F | HEIGHT: 72 IN | OXYGEN SATURATION: 96 % | SYSTOLIC BLOOD PRESSURE: 140 MMHG | HEART RATE: 65 BPM | DIASTOLIC BLOOD PRESSURE: 85 MMHG

## 2017-11-13 DIAGNOSIS — Z23 NEED FOR PROPHYLACTIC VACCINATION WITH TETANUS-DIPHTHERIA (TD): ICD-10-CM

## 2017-11-13 DIAGNOSIS — Z23 NEED FOR PROPHYLACTIC VACCINATION AND INOCULATION AGAINST INFLUENZA: ICD-10-CM

## 2017-11-13 DIAGNOSIS — D69.6 THROMBOCYTOPENIA (H): ICD-10-CM

## 2017-11-13 DIAGNOSIS — E03.9 HYPOTHYROIDISM, UNSPECIFIED TYPE: ICD-10-CM

## 2017-11-13 DIAGNOSIS — Z13.5 SCREENING FOR DIABETIC RETINOPATHY: ICD-10-CM

## 2017-11-13 DIAGNOSIS — C61 PROSTATE CANCER (H): ICD-10-CM

## 2017-11-13 DIAGNOSIS — F41.9 ANXIETY: ICD-10-CM

## 2017-11-13 DIAGNOSIS — Z01.818 PREOP GENERAL PHYSICAL EXAM: Primary | ICD-10-CM

## 2017-11-13 DIAGNOSIS — I45.2 RBBB WITH LEFT ANTERIOR FASCICULAR BLOCK: ICD-10-CM

## 2017-11-13 DIAGNOSIS — Z11.59 NEED FOR HEPATITIS C SCREENING TEST: ICD-10-CM

## 2017-11-13 LAB
BASOPHILS # BLD AUTO: 0 10E9/L (ref 0–0.2)
BASOPHILS NFR BLD AUTO: 0.4 %
DIFFERENTIAL METHOD BLD: ABNORMAL
EOSINOPHIL # BLD AUTO: 0.2 10E9/L (ref 0–0.7)
EOSINOPHIL NFR BLD AUTO: 3.2 %
ERYTHROCYTE [DISTWIDTH] IN BLOOD BY AUTOMATED COUNT: 12.6 % (ref 10–15)
HCT VFR BLD AUTO: 42.5 % (ref 40–53)
HGB BLD-MCNC: 15.1 G/DL (ref 13.3–17.7)
LYMPHOCYTES # BLD AUTO: 1.4 10E9/L (ref 0.8–5.3)
LYMPHOCYTES NFR BLD AUTO: 27.1 %
MCH RBC QN AUTO: 31 PG (ref 26.5–33)
MCHC RBC AUTO-ENTMCNC: 35.5 G/DL (ref 31.5–36.5)
MCV RBC AUTO: 87 FL (ref 78–100)
MONOCYTES # BLD AUTO: 0.4 10E9/L (ref 0–1.3)
MONOCYTES NFR BLD AUTO: 7 %
NEUTROPHILS # BLD AUTO: 3.3 10E9/L (ref 1.6–8.3)
NEUTROPHILS NFR BLD AUTO: 62.3 %
PLATELET # BLD AUTO: 140 10E9/L (ref 150–450)
RBC # BLD AUTO: 4.87 10E12/L (ref 4.4–5.9)
WBC # BLD AUTO: 5.3 10E9/L (ref 4–11)

## 2017-11-13 PROCEDURE — 90471 IMMUNIZATION ADMIN: CPT | Performed by: FAMILY MEDICINE

## 2017-11-13 PROCEDURE — 86803 HEPATITIS C AB TEST: CPT | Performed by: FAMILY MEDICINE

## 2017-11-13 PROCEDURE — 85610 PROTHROMBIN TIME: CPT | Performed by: FAMILY MEDICINE

## 2017-11-13 PROCEDURE — 36415 COLL VENOUS BLD VENIPUNCTURE: CPT | Performed by: FAMILY MEDICINE

## 2017-11-13 PROCEDURE — 90686 IIV4 VACC NO PRSV 0.5 ML IM: CPT | Performed by: FAMILY MEDICINE

## 2017-11-13 PROCEDURE — 99214 OFFICE O/P EST MOD 30 MIN: CPT | Mod: 25 | Performed by: FAMILY MEDICINE

## 2017-11-13 PROCEDURE — 85025 COMPLETE CBC W/AUTO DIFF WBC: CPT | Performed by: FAMILY MEDICINE

## 2017-11-13 PROCEDURE — 93000 ELECTROCARDIOGRAM COMPLETE: CPT | Performed by: FAMILY MEDICINE

## 2017-11-13 ASSESSMENT — PAIN SCALES - GENERAL: PAINLEVEL: NO PAIN (0)

## 2017-11-13 NOTE — MR AVS SNAPSHOT
After Visit Summary   11/13/2017    Adriel Elam    MRN: [id number]           Patient Information     Date Of Birth          1960        Visit Information        Provider Department      11/13/2017 6:20 PM Luis E Gill MD [facility]        Today's Diagnoses     Preop general physical exam    -  1    Prostate cancer (H)        Hypothyroidism, unspecified type        Anxiety        Thrombocytopenia (H)        RBBB with left anterior fascicular block; on going        Need for hepatitis C screening test        Screening for diabetic retinopathy        Need for prophylactic vaccination and inoculation against influenza        Need for prophylactic vaccination with tetanus-diphtheria (TD)          Care Instructions      Before Your Surgery      Call your surgeon if there is any change in your health. This includes signs of a cold or flu (such as a sore throat, runny nose, cough, rash or fever).    Do not smoke, drink alcohol or take over the counter medicine (unless your surgeon or primary care doctor tells you to) for the 24 hours before and after surgery.    If you take prescribed drugs: Follow your doctor s orders about which medicines to take and which to stop until after surgery.    Eating and drinking prior to surgery: follow the instructions from your surgeon    Take a shower or bath the night before surgery. Use the soap your surgeon gave you to gently clean your skin. If you do not have soap from your surgeon, use your regular soap. Do not shave or scrub the surgery site.  Wear clean pajamas and have clean sheets on your bed.   Before Your Surgery    Call your surgeon if there is any change in your health. This includes signs of a cold or flu (such as a sore throat, runny nose, cough, rash or fever).  Do not smoke, drink alcohol or take over the counter medicine (unless your surgeon or primary care doctor tells you to) for the 24 hours before and after surgery.  If you  take prescribed drugs: Follow your doctor s orders about which medicines to take and which to stop until after surgery.  Eating and drinking prior to surgery: follow the instructions from your surgeon  Take a shower or bath the night before surgery. Use the soap your surgeon gave you to gently clean your skin. If you do not have soap from your surgeon, use your regular soap. Do not shave or scrub the surgery site.  Wear clean pajamas and have clean sheets on your bed.   Before Your Surgery    Call your surgeon if there is any change in your health. This includes signs of a cold or flu (such as a sore throat, runny nose, cough, rash or fever).  Do not smoke, drink alcohol or take over the counter medicine (unless your surgeon or primary care doctor tells you to) for the 24 hours before and after surgery.  If you take prescribed drugs: Follow your doctor s orders about which medicines to take and which to stop until after surgery.  Eating and drinking prior to surgery: follow the instructions from your surgeon  Take a shower or bath the night before surgery. Use the soap your surgeon gave you to gently clean your skin. If you do not have soap from your surgeon, use your regular soap. Do not shave or scrub the surgery site.  Wear clean pajamas and have clean sheets on your bed.     [facility]    If you have any questions regarding to your visit please contact your care team:       Team Purple:   Clinic Hours Telephone Number   Dr. Chacha Jordan   7am-7pm  Monday - Thursday   7am-5pm  Fridays  [phone]  (Appointment scheduling available 24/7)    Questions about your Visit?   Team Line:  [phone]   Urgent Care - [place] and [place] - 11am-9pm Monday-Friday Saturday-Sunday- 9am-5pm   [place] - 5pm-9pm Monday-Friday Saturday-Sunday- 9am-5pm  [phone] - [place]   [phone] - [place]       What options do I  have for visits at the clinic other than the traditional office visit?  To expand how we care for you, many of our providers are utilizing electronic visits (e-visits) and telephone visits, when medically appropriate, for interactions with their patients rather than a visit in the clinic.   We also offer nurse visits for many medical concerns. Just like any other service, we will bill your insurance company for this type of visit based on time spent on the phone with your provider. Not all insurance companies cover these visits. Please check with your medical insurance if this type of visit is covered. You will be responsible for any charges that are not paid by your insurance.      E-visits via [employer]:  generally incur a $35.00 fee.  Telephone visits:  Time spent on the phone: *charged based on time that is spent on the phone in increments of 10 minutes. Estimated cost:   5-10 mins $30.00   11-20 mins. $59.00   21-30 mins. $85.00     Use [employer] (secure email communication and access to your chart) to send your primary care provider a message or make an appointment. Ask someone on your Team how to sign up for [employer].  For a Price Quote for your services, please call our [employer] Line at [phone].  As always, Thank you for trusting us with your health care needs!    Discharge by TANIA JACOME             Follow-ups after your visit        Who to contact     If you have questions or need follow up information about today's clinic visit or your schedule please contact [facility] directly at [phone].  Normal or non-critical lab and imaging results will be communicated to you by [employer] technologyhart, letter or phone within 4 business days after the clinic has received the results. If you do not hear from us within 7 days, please contact the clinic through [employer] technology[employer] or phone. If you have a critical or abnormal lab result, we will notify you by phone as soon as possible.  Submit refill requests through [employer] or  "call your pharmacy and they will forward the refill request to us. Please allow 3 business days for your refill to be completed.          Additional Information About Your Visit        MyChart Information     QThruhart lets you send messages to your doctor, view your test results, renew your prescriptions, schedule appointments and more. To sign up, go to [email or web address] . Click on \"Log in\" on the left side of the screen, which will take you to the Welcome page. Then click on \"Sign up Now\" on the right side of the page.     You will be asked to enter the access code listed below, as well as some personal information. Please follow the directions to create your username and password.     Your access code is: [email or web address]  Expires: 2018  7:47 PM     Your access code will  in 90 days. If you need help or a new code, please call your [facility] clinic or [phone].        Care EveryWhere ID     This is your Care EveryWhere ID. This could be used by other organizations to access your [facility] medical records  [id number]        Your Vitals Were     Pulse Temperature Height Pulse Oximetry BMI (Body Mass Index)       65 99.1  F (37.3  C) (Oral) 5' 11.75\" (1.822 m) 96% 31.41 kg/m2        Blood Pressure from Last 3 Encounters:   17 140/85   10/20/17 138/88   17 136/88    Weight from Last 3 Encounters:   17 230 lb (104.3 kg)   17 229 lb (103.9 kg)   17 231 lb (104.8 kg)              We Performed the Following     CBC with platelets differential     EKG 12-lead complete w/read - Clinics     Hepatitis C Screen Reflex to HCV RNA Quant and Genotype     INR        Primary Care Provider Office Phone # Fax #    Luis E Gill [phone] [address] [facility] [place] [address]        Equal Access to Services     HEATHER BANUELOS AH: Nelda Martínez, leon price, brett vega la'aan ah. So [facility] " [phone].    ATENCIÓN: Si tiara appiah, tiene a smith disposición servicios gratuitos de asistencia lingüística. Lilian peoples [phone].    We comply with applicable federal civil rights laws and [place] laws. We do not discriminate on the basis of race, color, national origin, age, disability, sex, sexual orientation, or gender identity.            Thank you!     Thank you for choosing [facility]  for your care. Our goal is always to provide you with excellent care. Hearing back from our patients is one way we can continue to improve our services. Please take a few minutes to complete the written survey that you may receive in the mail after your visit with us. Thank you!             Your Updated Medication List - Protect others around you: Learn how to safely use, store and throw away your medicines at www.disposemymeds.org.          This list is accurate as of: 11/13/17  7:01 PM.  Always use your most recent med list.                   Brand Name Dispense Instructions for use Diagnosis    busPIRone 15 MG tablet    BUSPAR    180 tablet    Take 1 tablet (15 mg) by mouth 2 times daily    KIMANI (generalized anxiety disorder)       levothyroxine 88 MCG tablet    SYNTHROID/LEVOTHROID    90 tablet    Take 1 tablet (88 mcg) by mouth daily    Hypothyroidism due to Hashimoto's thyroiditis       triamcinolone 0.5 % cream    KENALOG    30 g    Apply sparingly to affected area three times daily.    Atopic dermatitis, unspecified type

## 2017-11-13 NOTE — LETTER
[address] [place] [address]    November 16, 2017    Adriel Elam  [address]  [place] [address]          Dear Adriel,    Enclosed is a copy of your results. Normal results.    Results for orders placed or performed in visit on 11/13/17   Hepatitis C Screen Reflex to HCV RNA Quant and Genotype   Result Value Ref Range    Hepatitis C Antibody Nonreactive NR^Nonreactive   CBC with platelets differential   Result Value Ref Range    WBC 5.3 4.0 - 11.0 10e9/L    RBC Count 4.87 4.4 - 5.9 10e12/L    Hemoglobin 15.1 13.3 - 17.7 g/dL    Hematocrit 42.5 40.0 - 53.0 %    MCV 87 78 - 100 fl    MCH 31.0 26.5 - 33.0 pg    MCHC 35.5 31.5 - 36.5 g/dL    RDW 12.6 10.0 - 15.0 %    Platelet Count 140 (L) 150 - 450 10e9/L    Diff Method Automated Method     % Neutrophils 62.3 %    % Lymphocytes 27.1 %    % Monocytes 7.0 %    % Eosinophils 3.2 %    % Basophils 0.4 %    Absolute Neutrophil 3.3 1.6 - 8.3 10e9/L    Absolute Lymphocytes 1.4 0.8 - 5.3 10e9/L    Absolute Monocytes 0.4 0.0 - 1.3 10e9/L    Absolute Eosinophils 0.2 0.0 - 0.7 10e9/L    Absolute Basophils 0.0 0.0 - 0.2 10e9/L   INR   Result Value Ref Range    INR 0.99 0.86 - 1.14       If you have any questions or concerns, please me or my clinic team at [phone].      Sincerely,        Luis E Gill MD/bt

## 2017-11-14 ENCOUNTER — TELEPHONE (OUTPATIENT)
Dept: LAB | Facility: CLINIC | Age: 57
End: 2017-11-14

## 2017-11-14 DIAGNOSIS — N18.30 CHRONIC KIDNEY DISEASE, STAGE 3 (MODERATE): Primary | ICD-10-CM

## 2017-11-14 LAB
HCV AB SERPL QL IA: NONREACTIVE
INR PPP: 0.99 (ref 0.86–1.14)

## 2017-11-14 NOTE — NURSING NOTE
"Chief Complaint   Patient presents with     Pre-Op Exam     DOS: 11/27/2017       Initial BP (!) 148/94  Pulse 65  Temp 99.1  F (37.3  C) (Oral)  Ht 5' 11.75\" (1.822 m)  Wt 230 lb (104.3 kg)  SpO2 96%  BMI 31.41 kg/m2 Estimated body mass index is 31.41 kg/(m^2) as calculated from the following:    Height as of this encounter: 5' 11.75\" (1.822 m).    Weight as of this encounter: 230 lb (104.3 kg).  Medication Reconciliation: complete     Debbie Macdonald MA       "

## 2017-11-14 NOTE — PROGRESS NOTES
[facility]  [address] [place] [id number]  [phone]  Dept: [phone]    PRE-OP EVALUATION:  Today's date: 2017    Adriel Elam (: 1960) presents for pre-operative evaluation assessment as requested by Dr. Jacobson.  He requires evaluation and anesthesia risk assessment prior to undergoing surgery/procedure for treatment of Prostate cancer.  Proposed procedure: prostate surgery    Date of Surgery/ Procedure: 2017  Time of Surgery/ Procedure: 12:00 noon  Hospital/Surgical Facility: [facility]   Primary Physician: Luis E Gill  Type of Anesthesia Anticipated: to be determined    Patient has a Health Care Directive or Living Will:  NO    Preop Questions 2017   1.  Do you have a history of heart attack, stroke, stent, bypass or surgery on an artery in the head, neck, heart or legs? No   2.  Do you ever have any pain or discomfort in your chest? YES - Due to stress    3.  Do you have a history of  Heart Failure? No   4.   Are you troubled by shortness of breath when:  walking on a level surface, or up a slight hill, or at night? No   5.  Do you currently have a cold, bronchitis or other respiratory infection? No   6.  Do you have a cough, shortness of breath, or wheezing? No   7.  Do you sometimes get pains in the calves of your legs when you walk? No   8. Do you or anyone in your family have previous history of blood clots? No   9.  Do you or does anyone in your family have a serious bleeding problem such as prolonged bleeding following surgeries or cuts? YES - Personal; seems to have prolonged bleeding after cuts   10. Have you ever had problems with anemia or been told to take iron pills? No   11. Have you had any abnormal blood loss such as black, tarry or bloody stools? No   12. Have you ever had a blood transfusion? No   13. Have you or any of your relatives ever had problems with anesthesia? No   14. Do you have sleep apnea, excessive  "snoring or daytime drowsiness? No   15. Do you have any prosthetic heart valves? No   16. Do you have prosthetic joints? No       HPI:                                                      Brief HPI related to upcoming procedure: Prostate cancer diagnosed on biopsy after an elevated PSA.      See problem list for active medical problems.  Problems all longstanding and stable, except as noted/documented.  See ROS for pertinent symptoms related to these conditions.                                                                                                  .    MEDICAL HISTORY:                                                    Patient Active Problem List    Diagnosis Date Noted     Prostate cancer (H) 10/06/2017     Priority: Medium     On Biopsy, followed by urology associates       Hypothyroidism      Priority: Medium      Past Medical History:   Diagnosis Date     Hypothyroidism      History reviewed. No pertinent surgical history.  Current Outpatient Prescriptions   Medication Sig Dispense Refill     busPIRone (BUSPAR) 15 MG tablet Take 1 tablet (15 mg) by mouth 2 times daily 180 tablet 1     levothyroxine (SYNTHROID/LEVOTHROID) 88 MCG tablet Take 1 tablet (88 mcg) by mouth daily 90 tablet 1     triamcinolone (KENALOG) 0.5 % cream Apply sparingly to affected area three times daily. 30 g 1     OTC products: None, except as noted above    No Known Allergies   Latex Allergy: NO    Social History   Substance Use Topics     Smoking status: Never Smoker     Smokeless tobacco: Never Used     Alcohol use 0.0 oz/week     0 Standard drinks or equivalent per week      Comment: Rare      History   Drug Use No     REVIEW OF SYSTEMS:                                                    Constitutional, HEENT, cardiovascular, pulmonary and gi systems are negative, except as otherwise noted.    EXAM:                                                    /85  Pulse 65  Temp 99.1  F (37.3  C) (Oral)  Ht 5' 11.75\" (1.822 m)  " Wt 230 lb (104.3 kg)  SpO2 96%  BMI 31.41 kg/m2    GENERAL APPEARANCE: healthy, alert and no distress     EYES: EOMI,  PERRL     HENT: ear canals and TM's normal and nose and mouth without ulcers or lesions     NECK: no adenopathy and thyroid normal to palpation     RESP: lungs clear to auscultation - no rales, rhonchi or wheezes     CV: regular rates and rhythm, normal S1 S2, no S3 or S4 and no murmur, click or rub     ABDOMEN:  soft, nontender, no HSM or masses and bowel sounds normal     MS: extremities normal- no gross deformities noted, no evidence of inflammation in joints     SKIN: no suspicious lesions or rashes      PSYCH: mentation appears normal. and affect normal/bright    DIAGNOSTICS:                                                      EKG: Normal Sinus Rhythm, normal axis, normal intervals, no acute ST/T changes c/w ischemia, no LVH by voltage criteria, Right Bundle Branch Block, unchanged from previous tracings  Serum Creatinine  Coagulation Studies (e.g. INR, PTT, platelet count)  Labs Drawn and in Process:   Unresulted Labs Ordered in the Past 30 Days of this Admission     No orders found from 9/14/2017 to 11/14/2017.        Results for orders placed or performed in visit on 11/13/17   Hepatitis C Screen Reflex to HCV RNA Quant and Genotype   Result Value Ref Range    Hepatitis C Antibody Nonreactive NR^Nonreactive   CBC with platelets differential   Result Value Ref Range    WBC 5.3 4.0 - 11.0 10e9/L    RBC Count 4.87 4.4 - 5.9 10e12/L    Hemoglobin 15.1 13.3 - 17.7 g/dL    Hematocrit 42.5 40.0 - 53.0 %    MCV 87 78 - 100 fl    MCH 31.0 26.5 - 33.0 pg    MCHC 35.5 31.5 - 36.5 g/dL    RDW 12.6 10.0 - 15.0 %    Platelet Count 140 (L) 150 - 450 10e9/L    Diff Method Automated Method     % Neutrophils 62.3 %    % Lymphocytes 27.1 %    % Monocytes 7.0 %    % Eosinophils 3.2 %    % Basophils 0.4 %    Absolute Neutrophil 3.3 1.6 - 8.3 10e9/L    Absolute Lymphocytes 1.4 0.8 - 5.3 10e9/L    Absolute  Monocytes 0.4 0.0 - 1.3 10e9/L    Absolute Eosinophils 0.2 0.0 - 0.7 10e9/L    Absolute Basophils 0.0 0.0 - 0.2 10e9/L   INR   Result Value Ref Range    INR 0.99 0.86 - 1.14     Recent Labs   Lab Test  06/13/17   1743   NA  142   POTASSIUM  4.4   CR  1.44*     IMPRESSION:                                                    Reason for surgery/procedure: Prostate Cancer/Prostate Surgery  Diagnosis/reason for consult: Prostate Cancer/Pre OP    The proposed surgical procedure is considered INTERMEDIATE risk.    REVISED CARDIAC RISK INDEX  The patient has the following serious cardiovascular risks for perioperative complications such as (MI, PE, VFib and 3  AV Block):  No serious cardiac risks  INTERPRETATION: 1 risks: Class II (low risk - 0.9% complication rate)    The patient has the following additional risks for perioperative complications:  No identified additional risks      ICD-10-CM    1. Preop general physical exam Z01.818 EKG 12-lead complete w/read - Clinics     CANCELED: Creatinine   2. Prostate cancer (H) C61    3. Hypothyroidism, unspecified type E03.9    4. Anxiety F41.9    5. Thrombocytopenia (H) D69.6 CBC with platelets differential     INR   6. RBBB with left anterior fascicular block; on going I45.2    7. Need for hepatitis C screening test Z11.59 Hepatitis C Screen Reflex to HCV RNA Quant and Genotype   8. Screening for diabetic retinopathy Z13.5    9. Need for prophylactic vaccination and inoculation against influenza Z23 FLU VAC, SPLIT VIRUS IM > 3 YO (QUADRIVALENT) [77554]     Vaccine Administration, Initial [94350]   10. Need for prophylactic vaccination with tetanus-diphtheria (TD) Z23        RECOMMENDATIONS:                                                        --Patient is to take all scheduled medications on the day of surgery EXCEPT for modifications listed below.  None.    APPROVAL GIVEN to proceed with proposed procedure, without further diagnostic evaluation       Signed Electronically by:  Luis E Gill MD    Copy of this evaluation report is provided to requesting physician.    [facility] Preop Guidelines

## 2017-11-14 NOTE — TELEPHONE ENCOUNTER
You ordered a creatinine on this patient on 11/13/2017, unfortunately, the correct sample of blood was not available. I cancelled the original order and pended one for your review and signature. If you need this test done, please have your care team contact the patient to come in for a re-draw.     Thank you,    Mayra Frausto MLT, ([facility])  [facility]

## 2017-11-24 ENCOUNTER — TELEPHONE (OUTPATIENT)
Dept: UROLOGY | Facility: CLINIC | Age: 57
End: 2017-11-24

## 2017-11-24 ENCOUNTER — NURSE TRIAGE (OUTPATIENT)
Dept: NURSING | Facility: CLINIC | Age: 57
End: 2017-11-24

## 2017-11-24 NOTE — TELEPHONE ENCOUNTER
"Clinic Action Needed:Yes, Please call patient [phone]  Reason for Call:Patient calling requesting to clarify pre op instructions. Prostate Surgery 11/27/17.  Stating he is uncertain when he is supposed to start water only. Stating he has instructions that state the day before surgery.  Advised to follow pre op. Patient is requesting call back to clarify.  Reporting \"small chest cold\" starting in past 2 days. Occasional cough, afebrile. Denies other symptoms.  Routed to: [facility] Kg Carranza RN  [facility]            "

## 2017-11-24 NOTE — TELEPHONE ENCOUNTER
"Clinic Action Needed:Yes, Please call patient [phone]  Reason for Call:Patient calling requesting to clarify pre op instructions. Prostate Surgery 11/27/17.  Stating he is uncertain when he is supposed to start water only. Stating he has instructions that state the day before surgery.  Advised to follow pre op. Patient is requesting call back to clarify.  Reporting \"small chest cold\" starting in past 2 days. Occasional cough, afebrile. Denies other symptoms.  Routed to: [facility] Kg Carranza RN  [facility]  "

## 2017-11-24 NOTE — TELEPHONE ENCOUNTER
Called and spoke to patient.   Patient wondering if he should hold Buspar and levothyroxine on day of surgery or ok if to take with small sip of water.  Surgery is Monday (11/27/2017) for prostate cancer.   Please advise.     Heidi Thomas RN

## 2017-11-24 NOTE — TELEPHONE ENCOUNTER
Called and spoke to patient and gave him information per Dr. Gill.   Patient verbalized understanding & no further questions.     Heidi Thomas RN

## 2017-11-27 ENCOUNTER — TRANSFERRED RECORDS (OUTPATIENT)
Dept: HEALTH INFORMATION MANAGEMENT | Facility: CLINIC | Age: 57
End: 2017-11-27

## 2017-12-01 ENCOUNTER — TELEPHONE (OUTPATIENT)
Dept: UROLOGY | Facility: CLINIC | Age: 57
End: 2017-12-01

## 2017-12-01 NOTE — TELEPHONE ENCOUNTER
Adriel Elam is a 57 year old male who calls with complaints of not having a bowel movement since 11/27/2017.  Patient is post-op RRP.  Patient is currently taking OxyContin BID and using OTC tylenol. Currently taking Senna BID Patient denies abdominal pain.  Eating and drinking normal.      NURSING ASSESSMENT:  Description:  See above   Onset/duration:  11/27/2017   Precip. factors:  Post-op RRP  Associated symptoms:  Denies fever, chills, nausea, vomiting   Improves/worsens symptoms:  NA  Pain scale (0-10)   3/10  Last exam/Treatment:  11/27/2017  Allergies: No Known Allergies    MEDICATIONS:   Taking medication(s) as prescribed? Yes  Taking over the counter medication(s?) Yes  Any medication side effects? No significant side effects    Any barriers to taking medication(s) as prescribed?  No  Medication(s) improving/managing symptoms?  N/A  Medication reconciliation completed: Yes      NURSING PLAN: Nursing advice to patient see below     RECOMMENDED DISPOSITION:  Home care advice - Increase water intake, increase movement, try eating prunes, start weaning of the narcotic if possible ok to take up to 4000 mg of tylenol in a 24 hour period, ok to try OTC milk of magnesia as well.   Will comply with recommendation: Yes  If further questions/concerns or if symptoms do not improve, worsen or new symptoms develop, call your PCP or [facility] as soon as possible.      Guideline used:  Telephone Triage Protocols for Nurses, Fourth Edition, Juhi Caraballo RN

## 2017-12-07 ENCOUNTER — NURSE TRIAGE (OUTPATIENT)
Dept: NURSING | Facility: CLINIC | Age: 57
End: 2017-12-07

## 2017-12-08 NOTE — TELEPHONE ENCOUNTER
"  Reason for Disposition    Swollen ankle joint (all triage questions negative)  (Exception: area of localized swelling which is itchy)    Additional Information    Negative: Chest pain    Negative: Followed an ankle injury    Negative: Ankle pain is main symptom    Negative: Swelling of both ankles (i.e., pedal edema)    Negative: Swelling of calf or leg    Negative: Difficulty breathing    Negative: Entire foot is cool or blue in comparison to other side    Negative: Fever    Negative: Patient sounds very sick or weak to the triager    Negative: [1] SEVERE pain (e.g., excruciating, unable to walk) AND [2] not improved after 2 hours of pain medicine    Negative: [1] Can't move swollen joint at all AND [2] no fever    Negative: [1] Redness AND [2] painful when touched AND [3] no fever    Negative: [1] Red area or streak [2] large (> 2 in. or 5 cm)    Negative: [1] Thigh or calf pain AND [2] only 1 side AND [3] present > 1 hour    Negative: [1] Thigh, calf, or ankle swelling AND [2] only 1 side    Negative: [1] Thigh, calf, or ankle swelling AND [2] bilateral AND [3] 1 side is more swollen    Negative: [1] Very swollen joint AND [2] no fever    Negative: Looks like a boil, infected sore, deep ulcer or other infected rash (spreading redness, pus)    Negative: [1] MODERATE pain (e.g., interferes with normal activities, limping) AND [2] present > 3 days    Answer Assessment - Initial Assessment Questions  1. LOCATION: \"Which joint is swollen?\"      ankles  2. ONSET: \"When did the swelling start?\"      Past 2 or more days  3. SIZE: \"How large is the swelling?\"      \"fair amount\"  4. PAIN: \"Is there any pain?\" If so, ask: \"How bad is it?\" (Scale 1-10; or mild, moderate, severe)      no  5. CAUSE: \"What do you think caused the swollen joint?\"      Not sure, admits to sitting a lot with legs down, wearing compression stockings from the hospital, but not taking them off regularly  6. OTHER SYMPTOMS: \"Do you have any other " "symptoms?\" (e.g., fever, chest pain, difficulty breathing, calf pain)      no  7. PREGNANCY: \"Is there any chance you are pregnant?\" \"When was your last menstrual period?\"      no    Protocols used: ANKLE SWELLING-ADULT-AH    "

## 2017-12-11 ENCOUNTER — OFFICE VISIT (OUTPATIENT)
Dept: UROLOGY | Facility: CLINIC | Age: 57
End: 2017-12-11
Payer: COMMERCIAL

## 2017-12-11 VITALS — SYSTOLIC BLOOD PRESSURE: 132 MMHG | DIASTOLIC BLOOD PRESSURE: 80 MMHG | RESPIRATION RATE: 12 BRPM | HEART RATE: 76 BPM

## 2017-12-11 DIAGNOSIS — C61 PROSTATE CANCER (H): Primary | ICD-10-CM

## 2017-12-11 PROCEDURE — 99024 POSTOP FOLLOW-UP VISIT: CPT | Performed by: UROLOGY

## 2017-12-11 NOTE — PROGRESS NOTES
Chief Complaint   Patient presents with     RECHECK     post op 2 week RRP       Adriel Elam is a 57 year old male who presents today for follow up of   Chief Complaint   Patient presents with     RECHECK     post op 2 week RRP    no complaints    Current Outpatient Prescriptions   Medication Sig Dispense Refill     CIPROFLOXACIN PO        Acetaminophen (TYLENOL PO) Take 325 mg by mouth every 6 hours as needed for mild pain or fever       busPIRone (BUSPAR) 15 MG tablet Take 1 tablet (15 mg) by mouth 2 times daily 180 tablet 1     levothyroxine (SYNTHROID/LEVOTHROID) 88 MCG tablet Take 1 tablet (88 mcg) by mouth daily 90 tablet 1     triamcinolone (KENALOG) 0.5 % cream Apply sparingly to affected area three times daily. 30 g 1     No Known Allergies   Past Medical History:   Diagnosis Date     Hypothyroidism      No past surgical history on file.  Family History   Problem Relation Age of Onset     CANCER No family hx of      DIABETES No family hx of      Hypertension No family hx of      CEREBROVASCULAR DISEASE No family hx of      Macular Degeneration No family hx of      Glaucoma No family hx of      Thyroid Disease No family hx of      Social History     Social History     Marital status: Single     Spouse name: N/A     Number of children: N/A     Years of education: N/A     Social History Main Topics     Smoking status: Never Smoker     Smokeless tobacco: Never Used     Alcohol use 0.0 oz/week     0 Standard drinks or equivalent per week      Comment: Rare      Drug use: No     Sexual activity: No     Other Topics Concern     Parent/Sibling W/ Cabg, Mi Or Angioplasty Before 65f 55m? No     Social History Narrative       REVIEW OF SYSTEMS  =================  C: NEGATIVE for fever, chills, change in weight  I: NEGATIVE for worrisome rashes, moles or lesions  E/M: NEGATIVE for ear, mouth and throat problems  R: NEGATIVE for significant cough or SHORTNESS OF BREATH,   CV: NEGATIVE for chest pain, palpitations or  peripheral edema  GI: NEGATIVE for nausea, abdominal pain, heartburn, or change in bowel habits  NEURO: NEGATIVE any motor/sensory changes  PSYCH: NEGATIVE for recent mood disorder    Physical Exam:  /80 (BP Location: Right arm, Patient Position: Chair, Cuff Size: Adult Regular)  Pulse 76  Resp 12   Patient is pleasant, in no acute distress, good general condition.  Lung: no evidence of respiratory distress    Abdomen: Soft, nondistended, non tender. No masses. No rebound or guarding.   Exam: incision c/d/i  Skin: Warm and dry.  No redness.  Psych: normal mood and affect  Neuro: alert and oriented  Final Diagnosis Prostate, radical prostatectomy- Adenocarcinoma. See synoptic report below.    Procedure:  Radical prostatectomy  Prostate size:  48 gm, 4.6 x 3.2 x 3.2 cm  Histologic type: Adenocarcinoma (acinar, not otherwise specified)  Histologic grade: [place] pattern   Primary pattern:  4   Secondary pattern:  3   Total [place] score:  7       Grade group:  3   Indraductal carcinoma:  Not identified  Tumor quantitation:  Estimated 15% of prostate involved; dominant nodule 1.7 x 0.6 cm  Extraprostatic extension:  Present, non-focal, left posterior mid, left posterior superior, right inferior  Urinary bladder neck invasion:  Not identified  Seminal vesicle invasion:  Not identified  Margins:  Uninvolved  Treatment effect:  Not identified     Lymphovascular invasion:  Not identified  Perineural invasion:  Present  Regional lymph nodes:  No lymph nodes submitted  Tumor block(s) for molecular/send out tests: A10  Pathologic Stage (AJCC 8th ed): [id number]        Clinical Information Prostate cancer     Gross Description Prostate: Is a 48 g radical prostatectomy specimen with the following dimensions: 3.2 cm (superior-inferior), 4.6 cm (right-left), 3.2 cm (anterior-posterior). The seminal vesicles and vas deferens are detached from the gland and additionally received within in the container measuring 5.1 x 3.0 x  1.2 cm in aggregate. Right versus left cannot be ascertained. The specimen is inked as follows: posterior-black, right-yellow, left-blue. The apex and base margins are shaved and submitted perpendicularly. There is an ill-defined tan-white firm area within the left posterior mid prostate measuring 1.6 x 0.6 x 0.3 cm. There is focal yellow stippling with in the right anterior inferior prostate. There is no evidence of nodular hypertrophy.  Representative sections are submitted as follows:  A1-right apex margin  A2-left apex margin  A3-right base margin  A 4-left base margin  A5-right inferior (anterior and posterior)  A6-left inferior (anterior and posterior)  A7-right posterior mid  A8-left posterior mid  A9-right posterior superior  A10-left posterior superior  A11-right seminal vesicle stump to gland  A12-left seminal vesicle stump to gland   A13 and A14 representative sections of detached undesignated seminal vesicles (CM)         Assessment/Plan:   (C61) Prostate cancer (H)  (primary encounter diagnosis)  Comment: doing well post op.  Group 3 prostate cancer.  Plan: recheck psa in 3 weeks

## 2017-12-11 NOTE — NURSING NOTE
"Chief Complaint   Patient presents with     RECHECK     post op 2 week RRP       Initial /80 (BP Location: Right arm, Patient Position: Chair, Cuff Size: Adult Regular)  Pulse 76  Resp 12 Estimated body mass index is 31.41 kg/(m^2) as calculated from the following:    Height as of 11/13/17: 1.822 m (5' 11.75\").    Weight as of 11/13/17: 104.3 kg (230 lb).  Medication Reconciliation: complete   Haydee Loco, DEEPAK      "

## 2017-12-11 NOTE — MR AVS SNAPSHOT
After Visit Summary   12/11/2017    Adriel Elam    MRN: [id number]           Patient Information     Date Of Birth          1960        Visit Information        Provider Department      12/11/2017 8:45 AM David Jacobson MD [facility]        Today's Diagnoses     Prostate cancer (H)    -  1       Follow-ups after your visit        Your next 10 appointments already scheduled     Dec 11, 2017  8:45 AM CST   [facility]son [facility]   [facility]    [address] [place] [id number]   [phone]            Dec 11, 2017  1:00 PM CST   [facility]    [address] [facility] [address]   [phone]              Future tests that were ordered for you today     Open Future Orders        Priority Expected Expires Ordered    PSA tumor marker Routine 3/12/2018 12/12/2018 12/11/2017            Who to contact     If you have questions or need follow up information about today's clinic visit or your schedule please contact [facility] directly at [phone].  Normal or non-critical lab and imaging results will be communicated to you by MyChart, letter or phone within 4 business days after the clinic has received the results. If you do not hear from us within 7 days, please contact the clinic through MyChart or phone. If you have a critical or abnormal lab result, we will notify you by phone as soon as possible.  Submit refill requests through [employer] or call your pharmacy and they will forward the refill request to us. Please allow 3 business days for your refill to be completed.          Additional Information About Your Visit        [employer] Information     [employer] lets you send messages to your doctor, view your test results, renew your prescriptions, schedule appointments and more. To sign up, go to  [email or web address] . Click on \"Log in\" on the left side of the screen, which will take you to the Welcome page. Then click on \"Sign up Now\" on the right side of the page.     You will be asked to enter the access code listed below, as well as some personal information. Please follow the directions to create your username and password.     Your access code is: [email or web address]  Expires: 2018  7:47 PM     Your access code will  in 90 days. If you need help or a new code, please call your [facility] clinic or [phone].        Care EveryWhere ID     This is your Care EveryWhere ID. This could be used by other organizations to access your [facility] medical records  [id number]        Your Vitals Were     Pulse Respirations                76 12           Blood Pressure from Last 3 Encounters:   17 132/80   17 140/85   10/20/17 138/88    Weight from Last 3 Encounters:   17 104.3 kg (230 lb)   17 103.9 kg (229 lb)   17 104.8 kg (231 lb)               Primary Care Provider Office Phone # Fax #    Luis E Gill [phone] [address] [facility] [address]        Equal Access to Services     [facility] AH: Hadii timmy ku hadasho Soermaali, waaxda luqadaha, qaybta kaalmada sarah, brett snow. So [facility] [phone].    ATENCIÓN: Si habla español, tiene a smith disposición servicios gratuitos de asistencia lingüística. Lilian al [phone].    We comply with applicable federal civil rights laws and [place] laws. We do not discriminate on the basis of race, color, national origin, age, disability, sex, sexual orientation, or gender identity.            Thank you!     Thank you for choosing [facility]  for your care. Our goal is always to provide you with excellent care. Hearing back from our patients is one way we can continue to improve our services. Please take a few minutes to complete the written " survey that you may receive in the mail after your visit with us. Thank you!             Your Updated Medication List - Protect others around you: Learn how to safely use, store and throw away your medicines at www.disposemymeds.org.          This list is accurate as of: 12/11/17  8:28 AM.  Always use your most recent med list.                   Brand Name Dispense Instructions for use Diagnosis    busPIRone 15 MG tablet    BUSPAR    180 tablet    Take 1 tablet (15 mg) by mouth 2 times daily    KIMANI (generalized anxiety disorder)       CIPROFLOXACIN PO           levothyroxine 88 MCG tablet    SYNTHROID/LEVOTHROID    90 tablet    Take 1 tablet (88 mcg) by mouth daily    Hypothyroidism due to Hashimoto's thyroiditis       triamcinolone 0.5 % cream    KENALOG    30 g    Apply sparingly to affected area three times daily.    Atopic dermatitis, unspecified type       TYLENOL PO      Take 325 mg by mouth every 6 hours as needed for mild pain or fever

## 2017-12-26 ENCOUNTER — TELEPHONE (OUTPATIENT)
Dept: UROLOGY | Facility: CLINIC | Age: 57
End: 2017-12-26

## 2017-12-26 NOTE — TELEPHONE ENCOUNTER
RN called and spoke to patient. Patient states that he experiences some discomfort at incisional site off and on when is on his feet too much. Reports that this discomfort improves when resting. Denies any mass, fever, or other s/s of infection. RN explained that this sounds like normal healing. Advised patient to take tylenol and Ibuprofen when needed. Encouraged ice to area to help with discomfort if tolerated. Instructed patient to call with any significant changes. Patient agrees with plan.    LEONELA Rachel

## 2017-12-26 NOTE — TELEPHONE ENCOUNTER
Reason for Call:  Other prescription    Detailed comments:  Patient calling. He had a prostatectomy on 11-27-17. He is having a little discomfort. What can he take? Oxycodone makes him constipated.     Phone Number Patient can be reached at: Cell number on file:    Telephone Information:   Mobile [phone]       Best Time:  Any     Can we leave a detailed message on this number? YES    Call taken on 12/26/2017 at 2:35 PM by Renita Bryant

## 2017-12-27 DIAGNOSIS — C61 PROSTATE CANCER (H): ICD-10-CM

## 2017-12-27 PROCEDURE — 84153 ASSAY OF PSA TOTAL: CPT | Performed by: UROLOGY

## 2017-12-27 PROCEDURE — 36415 COLL VENOUS BLD VENIPUNCTURE: CPT | Performed by: UROLOGY

## 2017-12-27 NOTE — TELEPHONE ENCOUNTER
RN called patient back. SEE note from conversation yesterday. Patient states that he feels he might have a UTI. Continues to have discomfort between buttocks and penis region. States that the ice and Tylenol helped yesterday for this. States that it is more annoying then anything. RN spoke to patient about S/S of an UTI. Patient denies fever, urgency, foul odor, cloudy or dark color, flank pain, and burning. Patient states that he does have internal Hemorrhoids. Since the ice and tylenol were both effective and he does not have any other S/S of UTI RN encouraged patient to continue to ice use tylenol and ibuprofen. Call clinic with any other changes.    LEONELA Rachel

## 2017-12-28 LAB — PSA SERPL-MCNC: 0.06 UG/L (ref 0–4)

## 2018-01-02 ENCOUNTER — TELEPHONE (OUTPATIENT)
Dept: UROLOGY | Facility: CLINIC | Age: 58
End: 2018-01-02

## 2018-01-02 NOTE — TELEPHONE ENCOUNTER
Reason for Call:  Other call back    Detailed comments: Patient is wondering when he can exercise. He had surgery on 11/27/17.     Phone Number Patient can be reached at: Work number on file:  [phone] (work)    Best Time: any    Can we leave a detailed message on this number? [phone]    Call taken on 1/2/2018 at 3:01 PM by Anurag Hair

## 2018-01-03 NOTE — TELEPHONE ENCOUNTER
Called and spoke to patient.  Patient ok to do light exercise. Nothing strenuous for 6-8 weeks. Fany Caraballo RN

## 2018-01-04 ENCOUNTER — TELEPHONE (OUTPATIENT)
Dept: UROLOGY | Facility: CLINIC | Age: 58
End: 2018-01-04

## 2018-01-04 NOTE — TELEPHONE ENCOUNTER
Reason for Call:  Other Test results    Detailed comments: Patient would like the results of the PSA blood work. Please call.    Phone Number Patient can be reached at: Other phone number: [phone]     Best Time: any    Can we leave a detailed message on this number? YES    Call taken on 1/4/2018 at 8:07 AM by Ami Brown

## 2018-01-24 ENCOUNTER — NURSE TRIAGE (OUTPATIENT)
Dept: NURSING | Facility: CLINIC | Age: 58
End: 2018-01-24

## 2018-01-25 NOTE — TELEPHONE ENCOUNTER
Caller has pain in big toe when he is wearing  Shows ansock; no redness or toenail infections; fears he has DM.  Triage protocol reviewed and  advised clinic appointment if symptoms persist   Answered general information  questions about adult onset DM and reassured that  a painful toe is not a main symptom of onset    Reason for Disposition    Pain in the big toe joint    Protocols used: TOE PAIN-ADULT-AH  Sabrina Carter RN  [facility]

## 2018-01-29 ENCOUNTER — TELEPHONE (OUTPATIENT)
Dept: UROLOGY | Facility: CLINIC | Age: 58
End: 2018-01-29

## 2018-01-29 DIAGNOSIS — C61 PROSTATE CANCER (H): Primary | ICD-10-CM

## 2018-01-29 NOTE — TELEPHONE ENCOUNTER
Reason for Call:  Other call back    Detailed comments: patient calling and asking if he should come in and get his PSA lab before he is seen with Provider on 03/20/18. Patient asking for a return call    Phone Number Patient can be reached at: Home number on file [phone] (home)    Best Time: any time    Can we leave a detailed message on this number? YES    Call taken on 1/29/2018 at 5:05 PM by Kristy Kang

## 2018-01-30 NOTE — TELEPHONE ENCOUNTER
Left message for patient advising he arrange a lab appointment a few days prior to his return on 3/20.   Haydee Loco CMA

## 2018-02-16 ENCOUNTER — TELEPHONE (OUTPATIENT)
Dept: UROLOGY | Facility: CLINIC | Age: 58
End: 2018-02-16

## 2018-02-16 NOTE — TELEPHONE ENCOUNTER
Reason for Call:  Other call back    Detailed comments: patient states that he has noticed a changed in the Odor of his urine in the morning. Its been a few weeks now, not on a daily basis though. Patient unsure if this is alarming or not, just wanted to ask. Please contact patient.    Phone Number Patient can be reached at: [phone]       Best Time: any time    Can we leave a detailed message on this number? Yes      Call taken on 2/16/2018 at 12:01 PM by Kristy Kang

## 2018-03-01 DIAGNOSIS — C61 PROSTATE CANCER (H): ICD-10-CM

## 2018-03-01 PROCEDURE — 84153 ASSAY OF PSA TOTAL: CPT | Performed by: UROLOGY

## 2018-03-01 PROCEDURE — 36415 COLL VENOUS BLD VENIPUNCTURE: CPT | Performed by: UROLOGY

## 2018-03-02 LAB — PSA SERPL-MCNC: 0.02 UG/L (ref 0–4)

## 2018-03-06 ENCOUNTER — OFFICE VISIT (OUTPATIENT)
Dept: UROLOGY | Facility: CLINIC | Age: 58
End: 2018-03-06
Payer: COMMERCIAL

## 2018-03-06 VITALS — RESPIRATION RATE: 14 BRPM | SYSTOLIC BLOOD PRESSURE: 151 MMHG | HEART RATE: 61 BPM | DIASTOLIC BLOOD PRESSURE: 89 MMHG

## 2018-03-06 DIAGNOSIS — R03.0 ELEVATED BLOOD PRESSURE READING WITHOUT DIAGNOSIS OF HYPERTENSION: ICD-10-CM

## 2018-03-06 DIAGNOSIS — C61 PROSTATE CANCER (H): Primary | ICD-10-CM

## 2018-03-06 PROCEDURE — 99213 OFFICE O/P EST LOW 20 MIN: CPT | Performed by: UROLOGY

## 2018-03-06 NOTE — PROGRESS NOTES
Chief Complaint   Patient presents with     RECHECK     psa       Adriel Elam is a 58 year old male who presents today for follow up of   Chief Complaint   Patient presents with     RECHECK     psa   He is without any complaints.  Recent psa is 0.02.  He has no urinary issues.     Current Outpatient Prescriptions   Medication Sig Dispense Refill     Acetaminophen (TYLENOL PO) Take 325 mg by mouth every 6 hours as needed for mild pain or fever       busPIRone (BUSPAR) 15 MG tablet Take 1 tablet (15 mg) by mouth 2 times daily 180 tablet 1     levothyroxine (SYNTHROID/LEVOTHROID) 88 MCG tablet Take 1 tablet (88 mcg) by mouth daily 90 tablet 1     triamcinolone (KENALOG) 0.5 % cream Apply sparingly to affected area three times daily. 30 g 1     No Known Allergies   Past Medical History:   Diagnosis Date     Hypothyroidism      No past surgical history on file.  Family History   Problem Relation Age of Onset     CANCER No family hx of      DIABETES No family hx of      Hypertension No family hx of      CEREBROVASCULAR DISEASE No family hx of      Macular Degeneration No family hx of      Glaucoma No family hx of      Thyroid Disease No family hx of      Social History     Social History     Marital status: Single     Spouse name: N/A     Number of children: N/A     Years of education: N/A     Social History Main Topics     Smoking status: Never Smoker     Smokeless tobacco: Never Used     Alcohol use 0.0 oz/week     0 Standard drinks or equivalent per week      Comment: Rare      Drug use: No     Sexual activity: No     Other Topics Concern     Parent/Sibling W/ Cabg, Mi Or Angioplasty Before 65f 55m? No     Social History Narrative       REVIEW OF SYSTEMS  =================  C: NEGATIVE for fever, chills, change in weight  I: NEGATIVE for worrisome rashes, moles or lesions  E/M: NEGATIVE for ear, mouth and throat problems  R: NEGATIVE for significant cough or SHORTNESS OF BREATH,   CV: NEGATIVE for chest pain,  palpitations or peripheral edema  GI: NEGATIVE for nausea, abdominal pain, heartburn, or change in bowel habits  NEURO: NEGATIVE any motor/sensory changes  PSYCH: NEGATIVE for recent mood disorder    Physical Exam:  /89 (BP Location: Right arm, Patient Position: Chair, Cuff Size: Adult Regular)  Pulse 61  Resp 14   Patient is pleasant, in no acute distress, good general condition.  Lung: no evidence of respiratory distress    Abdomen: Soft, nondistended, non tender. No masses. No rebound or guarding.   Exam: incision c/d/i  Skin: Warm and dry.  No redness.  Psych: normal mood and affect  Neuro: alert and oriented  Final Diagnosis Prostate, radical prostatectomy- Adenocarcinoma. See synoptic report below.    Procedure:  Radical prostatectomy  Prostate size:  48 gm, 4.6 x 3.2 x 3.2 cm  Histologic type: Adenocarcinoma (acinar, not otherwise specified)  Histologic grade: Narka pattern   Primary pattern:  4   Secondary pattern:  3   Total Narka score:  7       Grade group:  3   Indraductal carcinoma:  Not identified  Tumor quantitation:  Estimated 15% of prostate involved; dominant nodule 1.7 x 0.6 cm  Extraprostatic extension:  Present, non-focal, left posterior mid, left posterior superior, right inferior  Urinary bladder neck invasion:  Not identified  Seminal vesicle invasion:  Not identified  Margins:  Uninvolved  Treatment effect:  Not identified     Lymphovascular invasion:  Not identified  Perineural invasion:  Present  Regional lymph nodes:  No lymph nodes submitted  Tumor block(s) for molecular/send out tests: A10  Pathologic Stage (AJCC 8th ed): pT3aNX        Clinical Information Prostate cancer     Gross Description Prostate: Is a 48 g radical prostatectomy specimen with the following dimensions: 3.2 cm (superior-inferior), 4.6 cm (right-left), 3.2 cm (anterior-posterior). The seminal vesicles and vas deferens are detached from the gland and additionally received within in the container  measuring 5.1 x 3.0 x 1.2 cm in aggregate. Right versus left cannot be ascertained. The specimen is inked as follows: posterior-black, right-yellow, left-blue. The apex and base margins are shaved and submitted perpendicularly. There is an ill-defined tan-white firm area within the left posterior mid prostate measuring 1.6 x 0.6 x 0.3 cm. There is focal yellow stippling with in the right anterior inferior prostate. There is no evidence of nodular hypertrophy.  Representative sections are submitted as follows:  A1-right apex margin  A2-left apex margin  A3-right base margin  A 4-left base margin  A5-right inferior (anterior and posterior)  A6-left inferior (anterior and posterior)  A7-right posterior mid  A8-left posterior mid  A9-right posterior superior  A10-left posterior superior  A11-right seminal vesicle stump to gland  A12-left seminal vesicle stump to gland   A13 and A14 representative sections of detached undesignated seminal vesicles (CM)         Assessment/Plan:   (C61) Prostate cancer (H)  (primary encounter diagnosis)  Comment:   Group 3 prostate cancer.  Plan: recheck psa in 6 months    Elevated bp: Patient to follow up with Primary Care provider regarding elevated blood pressure.

## 2018-03-06 NOTE — MR AVS SNAPSHOT
"              After Visit Summary   3/6/2018    Adriel Elam    MRN: [id number]           Patient Information     Date Of Birth          1960        Visit Information        Provider Department      3/6/2018 4:00 PM David Jacobson MD [facility]        Today's Diagnoses     Prostate cancer (H)    -  1    Elevated blood pressure reading without diagnosis of hypertension           Follow-ups after your visit        Your next 10 appointments already scheduled     Mar 06, 2018  4:00 PM CST   Return Visit with David Jacobson MD   [facility] ([address] [address]   [phone]              Future tests that were ordered for you today     Open Future Orders        Priority Expected Expires Ordered    PSA tumor marker Routine 9/5/2018 3/7/2019 3/6/2018            Who to contact     If you have questions or need follow up information about today's clinic visit or your schedule please contact [facility] directly at [phone].  Normal or non-critical lab and imaging results will be communicated to you by PharmacoPhotonicshart, letter or phone within 4 business days after the clinic has received the results. If you do not hear from us within 7 days, please contact the clinic through [employer] or phone. If you have a critical or abnormal lab result, we will notify you by phone as soon as possible.  Submit refill requests through Zen99 or call your pharmacy and they will forward the refill request to us. Please allow 3 business days for your refill to be completed.          Additional Information About Your Visit        PharmacoPhotonicsharMeilele Information     [email or web address] lets you send messages to your doctor, view your test results, renew your prescriptions, schedule appointments and more. To sign up, go to [email or web address] . Click on \"Log in\" on the left side of the screen, which will take you to the Welcome page. Then click on \"Sign up Now\" " on the right side of the page.     You will be asked to enter the access code listed below, as well as some personal information. Please follow the directions to create your username and password.     Your access code is: [email or web address]  Expires: 2018  3:50 PM     Your access code will  in 90 days. If you need help or a new code, please call your [place] clinic or [phone].        Care EveryWhere ID     This is your Care EveryWhere ID. This could be used by other organizations to access your [place] medical records  [id number]        Your Vitals Were     Pulse Respirations                61 14           Blood Pressure from Last 3 Encounters:   18 151/89   17 132/80   17 140/85    Weight from Last 3 Encounters:   17 104.3 kg (230 lb)   17 103.9 kg (229 lb)   17 104.8 kg (231 lb)                 Today's Medication Changes          These changes are accurate as of 3/6/18  3:50 PM.  If you have any questions, ask your nurse or doctor.               Stop taking these medicines if you haven't already. Please contact your care team if you have questions.     CIPROFLOXACIN PO   Stopped by:  David Jacobson MD                    Primary Care Provider Office Phone # Fax #    Luis E Gill [phone] [address] [facility] [address]        Equal Access to Services     [facility]: Hadii timmy ku hadasho Soermaali, waaxda luqadaha, qaybta kaalmada sarah, brett snow. So [facility] [phone].    ATENCIÓN: Si habla español, tiene a smith disposición servicios gratuitos de asistencia lingüística. Llame al [phone].    We comply with applicable federal civil rights laws and [place] laws. We do not discriminate on the basis of race, color, national origin, age, disability, sex, sexual orientation, or gender identity.            Thank you!     Thank you for choosing [facility]  for your care.  Our goal is always to provide you with excellent care. Hearing back from our patients is one way we can continue to improve our services. Please take a few minutes to complete the written survey that you may receive in the mail after your visit with us. Thank you!             Your Updated Medication List - Protect others around you: Learn how to safely use, store and throw away your medicines at www.disposemymeds.org.          This list is accurate as of 3/6/18  3:50 PM.  Always use your most recent med list.                   Brand Name Dispense Instructions for use Diagnosis    busPIRone 15 MG tablet    BUSPAR    180 tablet    Take 1 tablet (15 mg) by mouth 2 times daily    KIMANI (generalized anxiety disorder)       levothyroxine 88 MCG tablet    SYNTHROID/LEVOTHROID    90 tablet    Take 1 tablet (88 mcg) by mouth daily    Hypothyroidism due to Hashimoto's thyroiditis       triamcinolone 0.5 % cream    KENALOG    30 g    Apply sparingly to affected area three times daily.    Atopic dermatitis, unspecified type       TYLENOL PO      Take 325 mg by mouth every 6 hours as needed for mild pain or fever

## 2018-03-29 ENCOUNTER — TELEPHONE (OUTPATIENT)
Dept: FAMILY MEDICINE | Facility: CLINIC | Age: 58
End: 2018-03-29

## 2018-03-29 ENCOUNTER — OFFICE VISIT (OUTPATIENT)
Dept: FAMILY MEDICINE | Facility: CLINIC | Age: 58
End: 2018-03-29
Payer: COMMERCIAL

## 2018-03-29 VITALS
HEIGHT: 73 IN | WEIGHT: 206.5 LBS | DIASTOLIC BLOOD PRESSURE: 88 MMHG | HEART RATE: 61 BPM | SYSTOLIC BLOOD PRESSURE: 142 MMHG | OXYGEN SATURATION: 99 % | TEMPERATURE: 98.4 F | RESPIRATION RATE: 16 BRPM | BODY MASS INDEX: 27.37 KG/M2

## 2018-03-29 DIAGNOSIS — G47.19 EXCESSIVE DAYTIME SLEEPINESS: ICD-10-CM

## 2018-03-29 DIAGNOSIS — R42 LIGHT HEADEDNESS: Primary | ICD-10-CM

## 2018-03-29 DIAGNOSIS — R06.83 SNORING: ICD-10-CM

## 2018-03-29 PROCEDURE — 99213 OFFICE O/P EST LOW 20 MIN: CPT | Performed by: FAMILY MEDICINE

## 2018-03-29 NOTE — PROGRESS NOTES
"  SUBJECTIVE:   Adriel Elam is a 58 year old male who presents to clinic today for the following health issues:      Patient presents with:  Dizziness: May have taken an extra Buspar   Nausea: Patietn states the Nausea and dizziness started today. Patient woke up feeling great and took 2 Buspar this morning and started to feel dizzy and nausea. Patient states he is feeling a little better but wanted to get checked out.   Health Maintenance: Tetanus, ADP, Eye Exam     Took 3 buspar this morning by accident has been a little lightheaded, however has since improved.  He's worried about needing toxicology labs.  Problem list and histories reviewed & adjusted, as indicated.  Additional history: as documented    BP Readings from Last 3 Encounters:   03/29/18 142/88   03/06/18 151/89   12/11/17 132/80    Wt Readings from Last 3 Encounters:   03/29/18 206 lb 8 oz (93.7 kg)   11/13/17 230 lb (104.3 kg)   07/17/17 229 lb (103.9 kg)        Reviewed and updated as needed this visit by clinical staff  Tobacco  Allergies  Meds  Problems  Med Hx  Surg Hx  Fam Hx  Soc Hx        Reviewed and updated as needed this visit by Provider  Allergies  Meds  Problems         ROS:  Constitutional, HEENT, cardiovascular, pulmonary, gi and gu systems are negative, except as otherwise noted.    OBJECTIVE:     /88  Pulse 61  Temp 98.4  F (36.9  C) (Oral)  Resp 16  Ht 6' 0.83\" (1.85 m)  Wt 206 lb 8 oz (93.7 kg)  SpO2 99%  BMI 27.37 kg/m2  Body mass index is 27.37 kg/(m^2).  GENERAL: healthy, alert and no distress  EYES: Eyes grossly normal to inspection, PERRL and conjunctivae and sclerae normal  HENT: ear canals and TM's normal, nose and mouth without ulcers or lesions  NECK: no adenopathy, no asymmetry, masses, or scars and thyroid normal to palpation  RESP: lungs clear to auscultation - no rales, rhonchi or wheezes  CV: regular rate and rhythm, normal S1 S2, no S3 or S4, no murmur, click or rub, no peripheral edema and " peripheral pulses strong  ABDOMEN: soft, nontender, no hepatosplenomegaly, no masses and bowel sounds normal  SKIN: no suspicious lesions or rashes  NEURO: Normal strength and tone, mentation intact and speech normal  PSYCH: mentation appears normal, affect normal/bright    ASSESSMENT/PLAN:     1. Light headedness  Likely due to the fact that he took more buspar than prescribed in a short period of time.  Recommend he continue to monitor symptoms over the next 48 hours.    Follow up if symptoms worsen or fail to improve.     David Jordan MD  [facility]

## 2018-03-29 NOTE — MR AVS SNAPSHOT
After Visit Summary   3/29/2018    Adriel Elam    MRN: [id number]           Patient Information     Date Of Birth          1960        Visit Information        Provider Department      3/29/2018 10:40 AM David Jordan MD [facility]        Today's Diagnoses     Need for prophylactic vaccination with tetanus-diphtheria (TD)    -  1    Excessive daytime sleepiness        Snoring          Care Instructions    [facility]    If you have any questions regarding to your visit please contact your care team:       Team Purple:   Clinic Hours Telephone Number   Dr. Chacha Jordan   7am-7pm  Monday - Thursday   7am-5pm  Fridays  [phone]  (Appointment scheduling available 24/7)    Questions about your Visit?   Team Line:  [phone]   Urgent Care - [place] and [facility] - 11am-9pm Monday-Friday Saturday-Sunday- 9am-5pm   [place] - 5pm-9pm Monday-Friday Saturday-Sunday- 9am-5pm  [phone] - [facility]  [phone] - [place]       What options do I have for visits at the clinic other than the traditional office visit?  To expand how we care for you, many of our providers are utilizing electronic visits (e-visits) and telephone visits, when medically appropriate, for interactions with their patients rather than a visit in the clinic.   We also offer nurse visits for many medical concerns. Just like any other service, we will bill your insurance company for this type of visit based on time spent on the phone with your provider. Not all insurance companies cover these visits. Please check with your medical insurance if this type of visit is covered. You will be responsible for any charges that are not paid by your insurance.      E-visits via Counsyl:  generally incur a $35.00 fee.  Telephone visits:  Time spent on the phone: *charged based on time that is spent on the  "phone in increments of 10 minutes. Estimated cost:   5-10 mins $30.00   11-20 mins. $59.00   21-30 mins. $85.00     Use Celsionhart (secure email communication and access to your chart) to send your primary care provider a message or make an appointment. Ask someone on your Team how to sign up for [employer].  For a Price Quote for your services, please call our TagCash Line at [phone].  As always, Thank you for trusting us with your health care needs!    Kathy Beyer MA            Follow-ups after your visit        Your next 10 appointments already scheduled     Sep 04, 2018  4:15 PM CDT   Return Visit with David Jacobson MD   [facility] [address] [phone]              Who to contact     If you have questions or need follow up information about today's clinic visit or your schedule please contact [facility] directly at [phone].  Normal or non-critical lab and imaging results will be communicated to you by MyChart, letter or phone within 4 business days after the clinic has received the results. If you do not hear from us within 7 days, please contact the clinic through Celsionhart or phone. If you have a critical or abnormal lab result, we will notify you by phone as soon as possible.  Submit refill requests through natue or call your pharmacy and they will forward the refill request to us. Please allow 3 business days for your refill to be completed.          Additional Information About Your Visit        natue Information     natue lets you send messages to your doctor, view your test results, renew your prescriptions, schedule appointments and more. To sign up, go to [email or web address] . Click on \"Log in\" on the left side of the screen, which will take you to the Welcome page. Then click on \"Sign up Now\" on the right side of the page.     You will be asked to enter the access code listed " "below, as well as some personal information. Please follow the directions to create your username and password.     Your access code is: [email or web address]  Expires: 2018  4:50 PM     Your access code will  in 90 days. If you need help or a new code, please call your [place] clinic or [phone].        Care EveryWhere ID     This is your Care EveryWhere ID. This could be used by other organizations to access your [place] medical records  [id number]        Your Vitals Were     Pulse Temperature Respirations Height Pulse Oximetry BMI (Body Mass Index)    61 98.4  F (36.9  C) (Oral) 16 6' 0.83\" (1.85 m) 99% 27.37 kg/m2       Blood Pressure from Last 3 Encounters:   18 142/88   18 151/89   17 132/80    Weight from Last 3 Encounters:   18 206 lb 8 oz (93.7 kg)   17 230 lb (104.3 kg)   17 229 lb (103.9 kg)              Today, you had the following     No orders found for display       Primary Care Provider Office Phone # Fax #    Luis E Gill [phone] [address] [facility] [address]        Equal Access to Services     [facility]: Hadii aad ku hadasho Soomaali, waaxda luqadaha, qaybta kaalmada adeegyada, brett bridges . So [facility] [phone].    ATENCIÓN: Si habla español, tiene a smith disposición servicios gratuitos de asistencia lingüística. Llmalena al [phone].    We comply with applicable federal civil rights laws and [place] laws. We do not discriminate on the basis of race, color, national origin, age, disability, sex, sexual orientation, or gender identity.            Thank you!     Thank you for choosing [facility]  for your care. Our goal is always to provide you with excellent care. Hearing back from our patients is one way we can continue to improve our services. Please take a few minutes to complete the written survey that you may receive in the mail after your visit with us. " Thank you!             Your Updated Medication List - Protect others around you: Learn how to safely use, store and throw away your medicines at www.disposemymeds.org.          This list is accurate as of 3/29/18 11:17 AM.  Always use your most recent med list.                   Brand Name Dispense Instructions for use Diagnosis    busPIRone 15 MG tablet    BUSPAR    180 tablet    Take 1 tablet (15 mg) by mouth 2 times daily    KIMANI (generalized anxiety disorder)       levothyroxine 88 MCG tablet    SYNTHROID/LEVOTHROID    90 tablet    Take 1 tablet (88 mcg) by mouth daily    Hypothyroidism due to Hashimoto's thyroiditis       triamcinolone 0.5 % cream    KENALOG    30 g    Apply sparingly to affected area three times daily.    Atopic dermatitis, unspecified type       TYLENOL PO      Take 325 mg by mouth every 6 hours as needed for mild pain or fever

## 2018-03-29 NOTE — PATIENT INSTRUCTIONS
[facility]    If you have any questions regarding to your visit please contact your care team:       Team Purple:   Clinic Hours Telephone Number   Dr. Chacha Jordan   7am-7pm  Monday - Thursday   7am-5pm  Fridays  [phone]  (Appointment scheduling available 24/7)    Questions about your Visit?   Team Line:  [phone]   Urgent Care - [place] and [facility] - 11am-9pm Monday-Friday Saturday-Sunday- 9am-5pm   [place] - 5pm-9pm Monday-Friday Saturday-Sunday- 9am-5pm  [phone] - [facility]  [phone] - [place]       What options do I have for visits at the clinic other than the traditional office visit?  To expand how we care for you, many of our providers are utilizing electronic visits (e-visits) and telephone visits, when medically appropriate, for interactions with their patients rather than a visit in the clinic.   We also offer nurse visits for many medical concerns. Just like any other service, we will bill your insurance company for this type of visit based on time spent on the phone with your provider. Not all insurance companies cover these visits. Please check with your medical insurance if this type of visit is covered. You will be responsible for any charges that are not paid by your insurance.      E-visits via ShareDesk:  generally incur a $35.00 fee.  Telephone visits:  Time spent on the phone: *charged based on time that is spent on the phone in increments of 10 minutes. Estimated cost:   5-10 mins $30.00   11-20 mins. $59.00   21-30 mins. $85.00     Use O[employer]ula Therapeuticshart (secure email communication and access to your chart) to send your primary care provider a message or make an appointment. Ask someone on your Team how to sign up for ShareDesk.  For a Price Quote for your services, please call our Consumer Price Line at [phone].  As always, Thank you for trusting us with your health care  needs!    Kathy Beyer MA

## 2018-03-29 NOTE — NURSING NOTE
"Chief Complaint   Patient presents with     Dizziness     May have taken an extra Buspar      Nausea     Patietn states the Nausea and dizziness started today. Patient woke up feeling great and took 2 Buspar this morning and started to feel dizzy and nausea. Patient states he is feeling a little better but wanted to get checked out.      Health Maintenance     Tetanus, ADP, Eye Exam        Initial /88  Pulse 61  Temp 98.4  F (36.9  C) (Oral)  Resp 16  Ht 6' 0.83\" (1.85 m)  Wt 206 lb 8 oz (93.7 kg)  SpO2 99%  BMI 27.37 kg/m2 Estimated body mass index is 27.37 kg/(m^2) as calculated from the following:    Height as of this encounter: 6' 0.83\" (1.85 m).    Weight as of this encounter: 206 lb 8 oz (93.7 kg).  Medication Reconciliation: leela Livingston.       "

## 2018-03-29 NOTE — TELEPHONE ENCOUNTER
Reason for Call:  Other prescription  busPIRone (BUSPAR) 15 MG tablet    Detailed comments: patient report dizziness, nausea, lightheadedness - transfer to RN  Phone Number Patient can be reached at: Cell number on file:    Telephone Information:   Mobile [phone]       Best Time: ASAP    Can we leave a detailed message on this number? YES    Call taken on 3/29/2018 at 9:24 AM by Harini Pelletier

## 2018-03-29 NOTE — TELEPHONE ENCOUNTER
Per patient, he took his Buspar this morning around 4:30 AM- 5:00 AM  He is unsure if he had taken an extra pill or not  Started feeling dizziness, nausea, and anxious about 30 min ago  A little better now but still having symptoms     Advised patient to drink water and come in for an appointment for further assessment of symptoms as it is unsure whether he took more med than prescribed    Appointment made with Dr. Jordan for this morning    Lena Rausch RN

## 2018-04-12 ENCOUNTER — TELEPHONE (OUTPATIENT)
Dept: FAMILY MEDICINE | Facility: CLINIC | Age: 58
End: 2018-04-12

## 2018-04-12 NOTE — TELEPHONE ENCOUNTER
Patient requesting stronger anxiety medication.  Last discussed anxiety at phone visit on 10/16/17.  Can patient be offered a phone visit or does he need in office visit?  Britni Sow RN

## 2018-04-12 NOTE — TELEPHONE ENCOUNTER
Reason for Call:  Other prescription    Detailed comments:  Patient calling. He is wondering if there is anything stronger for his anxiety. Please call to discuss further.     Phone Number Patient can be reached at: Cell number on file:    Telephone Information:   Mobile [phone]       Best Time:  Any     Can we leave a detailed message on this number? YES    Call taken on 4/12/2018 at 10:06 AM by Renita Bryant

## 2018-04-13 ENCOUNTER — OFFICE VISIT (OUTPATIENT)
Dept: FAMILY MEDICINE | Facility: CLINIC | Age: 58
End: 2018-04-13
Payer: COMMERCIAL

## 2018-04-13 VITALS
RESPIRATION RATE: 13 BRPM | BODY MASS INDEX: 27.3 KG/M2 | OXYGEN SATURATION: 98 % | HEIGHT: 73 IN | HEART RATE: 65 BPM | DIASTOLIC BLOOD PRESSURE: 66 MMHG | TEMPERATURE: 98.3 F | SYSTOLIC BLOOD PRESSURE: 134 MMHG | WEIGHT: 206 LBS

## 2018-04-13 DIAGNOSIS — E06.3 HYPOTHYROIDISM DUE TO HASHIMOTO'S THYROIDITIS: ICD-10-CM

## 2018-04-13 DIAGNOSIS — F41.1 GENERALIZED ANXIETY DISORDER: Primary | ICD-10-CM

## 2018-04-13 DIAGNOSIS — D69.6 THROMBOCYTOPENIA (H): ICD-10-CM

## 2018-04-13 PROCEDURE — 99213 OFFICE O/P EST LOW 20 MIN: CPT | Performed by: FAMILY MEDICINE

## 2018-04-13 RX ORDER — LEVOTHYROXINE SODIUM 88 UG/1
88 TABLET ORAL DAILY
Qty: 90 TABLET | Refills: 1 | Status: SHIPPED | OUTPATIENT
Start: 2018-04-13 | End: 2018-10-11

## 2018-04-13 RX ORDER — BUSPIRONE HYDROCHLORIDE 15 MG/1
30 TABLET ORAL 2 TIMES DAILY
Qty: 120 TABLET | Refills: 0 | Status: SHIPPED | OUTPATIENT
Start: 2018-04-13 | End: 2018-05-12

## 2018-04-13 NOTE — PATIENT INSTRUCTIONS
[facility]    If you have any questions regarding to your visit please contact your care team:       Team Purple:   Clinic Hours Telephone Number   Dr. Chacha Jordan   7am-7pm  Monday - Thursday   7am-5pm  Fridays  [phone]  (Appointment scheduling available 24/7)    Questions about your Visit?   Team Line:  [phone]   Urgent Care - [place] [facility] - 11am-9pm Monday-Friday Saturday-Sunday- 9am-5pm   [place] - 5pm-9pm Monday-Friday Saturday-Sunday- 9am-5pm  [phone] - [facility]  [phone] - [place]       What options do I have for visits at the clinic other than the traditional office visit?  To expand how we care for you, many of our providers are utilizing electronic visits (e-visits) and telephone visits, when medically appropriate, for interactions with their patients rather than a visit in the clinic.   We also offer nurse visits for many medical concerns. Just like any other service, we will bill your insurance company for this type of visit based on time spent on the phone with your provider. Not all insurance companies cover these visits. Please check with your medical insurance if this type of visit is covered. You will be responsible for any charges that are not paid by your insurance.      E-visits via [employer]:  generally incur a $35.00 fee.  Telephone visits:  Time spent on the phone: *charged based on time that is spent on the phone in increments of 10 minutes. Estimated cost:   5-10 mins $30.00   11-20 mins. $59.00   21-30 mins. $85.00     Use Upworthyhart (secure email communication and access to your chart) to send your primary care provider a message or make an appointment. Ask someone on your Team how to sign up for [employer].  For a Price Quote for your services, please call our Consumer Price Line at [phone].  As always, Thank you for trusting us with your health care  needs!      Discharged by: Yara.

## 2018-04-13 NOTE — PROGRESS NOTES
SUBJECTIVE:   Adriel Elam is a 58 year old male who presents to clinic today for the following health issues:    Depression and Anxiety Follow-Up    Status since last visit: Worsened, Feels as if the medication isn't helping as well it has in the past maybe a increase in dose.    Other associated symptoms:None    Complicating factors:     Significant life event: No     Current substance abuse: None    PHQ-9 6/13/2017 7/17/2017   Total Score 2 3   Q9: Suicide Ideation Not at all Not at all     KIMANI-7 SCORE 6/13/2017 7/17/2017   Total Score 8 7     PHQ-9  English  PHQ-9   Any Language  KIMANI-7  Suicide Assessment Five-step Evaluation and Treatment (SAFE-T)    Hypothyroidism Follow-up      Since last visit, patient describes the following symptoms: Weight stable, no hair loss, no skin changes, no constipation, no loose stools    Thrombocytopenia:     CBC: 11/13/2017: 140                 3/11/2016   118           Non bleeding noted    Amount of exercise or physical activity: None    Problems taking medications regularly: No    Medication side effects: none    Diet: regular (no restrictions)    Problem list and histories reviewed & adjusted, as indicated.  Additional history: as documented    Patient Active Problem List   Diagnosis     Hypothyroidism     Prostate cancer (H)     History reviewed. No pertinent surgical history.    Social History   Substance Use Topics     Smoking status: Never Smoker     Smokeless tobacco: Never Used     Alcohol use 0.0 oz/week     0 Standard drinks or equivalent per week      Comment: Rare      Family History   Problem Relation Age of Onset     CANCER No family hx of      DIABETES No family hx of      Hypertension No family hx of      CEREBROVASCULAR DISEASE No family hx of      Macular Degeneration No family hx of      Glaucoma No family hx of      Thyroid Disease No family hx of          Reviewed and updated as needed this visit by clinical staff    ROS:  Constitutional, HEENT,  "cardiovascular, pulmonary, gi and gu systems are negative, except as otherwise noted.    OBJECTIVE:     /66 (BP Location: Right arm, Patient Position: Chair, Cuff Size: Adult Regular)  Pulse 65  Temp 98.3  F (36.8  C) (Oral)  Resp 13  Ht 6' 0.83\" (1.85 m)  Wt 206 lb (93.4 kg)  SpO2 98%  BMI 27.3 kg/m2  Body mass index is 27.3 kg/(m^2).  GENERAL: healthy, alert and no distress  EYES: Eyes grossly normal to inspection, PERRL and conjunctivae and sclerae normal  NECK: no adenopathy, no asymmetry, masses, or scars and thyroid normal to palpation  RESP: lungs clear to auscultation - no rales, rhonchi or wheezes  CV: regular rate and rhythm, normal S1 S2, no S3 or S4, no murmur, click or rub, no peripheral edema and peripheral pulses strong  ABDOMEN: soft, nontender, no masses and bowel sounds normal  MS: no gross musculoskeletal defects noted, no edema    Diagnostic Test Results:  none     ASSESSMENT/PLAN:   (F41.1) Generalized anxiety disorder  (primary encounter diagnosis)  Comment: Symptoms not very well controlled on Kdbmlu46 mg, discussed increasing the dose to 30 mg daily.  If her side effects can do 1.5 tablets daily.  Recheck in 1 month  Plan: busPIRone (BUSPAR) 15 MG tablet    (E03.8,  E06.3) Hypothyroidism due to Hashimoto's thyroiditis  Comment: Taking Synthroid. Refill  Plan: levothyroxine (SYNTHROID/LEVOTHROID) 88 MCG         tablet    (D69.6) Thrombocytopenia (H)  Comment: Borderline, no symptoms.  Possibly from alcohol use.  Plan: Recheck CBC    Follow up in 1 month or sooner with concerns    Luis E Gill MD  [facility]  "

## 2018-04-13 NOTE — NURSING NOTE
"Chief Complaint   Patient presents with     Anxiety     Health Maintenance     Tetanus, ADP, Eye Exam      Initial /66 (BP Location: Right arm, Patient Position: Chair, Cuff Size: Adult Regular)  Pulse 65  Temp 98.3  F (36.8  C) (Oral)  Resp 13  Ht 6' 0.83\" (1.85 m)  Wt 206 lb (93.4 kg)  SpO2 98%  BMI 27.3 kg/m2 Estimated body mass index is 27.3 kg/(m^2) as calculated from the following:    Height as of this encounter: 6' 0.83\" (1.85 m).    Weight as of this encounter: 206 lb (93.4 kg).  Medication Reconciliation: complete     Robert Hancock  "

## 2018-04-13 NOTE — MR AVS SNAPSHOT
After Visit Summary   4/13/2018    Adriel Elam    MRN: [id number]           Patient Information     Date Of Birth          1960        Visit Information        Provider Department      4/13/2018 12:20 PM Luis E Gill MD [facility]        Today's Diagnoses     Generalized anxiety disorder    -  1    Need for prophylactic vaccination with tetanus-diphtheria (TD)          Care Instructions    [facility]    If you have any questions regarding to your visit please contact your care team:       Team Purple:   Clinic Hours Telephone Number   Dr. Chacha Jordan   7am-7pm  Monday - Thursday   7am-5pm  Fridays  [phone]  (Appointment scheduling available 24/7)    Questions about your Visit?   Team Line:  [phone]   Urgent Care - [place] [facility] - 11am-9pm Monday-Friday Saturday-Sunday- 9am-5pm   [place] - 5pm-9pm Monday-Friday Saturday-Sunday- 9am-5pm  [phone] - [facility]  [phone] - [place]       What options do I have for visits at the clinic other than the traditional office visit?  To expand how we care for you, many of our providers are utilizing electronic visits (e-visits) and telephone visits, when medically appropriate, for interactions with their patients rather than a visit in the clinic.   We also offer nurse visits for many medical concerns. Just like any other service, we will bill your insurance company for this type of visit based on time spent on the phone with your provider. Not all insurance companies cover these visits. Please check with your medical insurance if this type of visit is covered. You will be responsible for any charges that are not paid by your insurance.      E-visits via [employer]:  generally incur a $35.00 fee.  Telephone visits:  Time spent on the phone: *charged based on time that is spent on the phone in increments of 10  "minutes. Estimated cost:   5-10 mins $30.00   11-20 mins. $59.00   21-30 mins. $85.00     Use Boomrathart (secure email communication and access to your chart) to send your primary care provider a message or make an appointment. Ask someone on your Team how to sign up for UUSEEt.  For a Price Quote for your services, please call our [employer] Line at [phone].  As always, Thank you for trusting us with your health care needs!      Discharged by: Yara.             Follow-ups after your visit        Follow-up notes from your care team     Return in about 1 month (around 5/13/2018).      Your next 10 appointments already scheduled     Sep 04, 2018  4:15 PM CDT   [facility]   [facility]    [address] [place] [address]  [place] [id number] [phone]              Who to contact     If you have questions or need follow up information about today's clinic visit or your schedule please contact [facility] directly at [phone].  Normal or non-critical lab and imaging results will be communicated to you by MyChart, letter or phone within 4 business days after the clinic has received the results. If you do not hear from us within 7 days, please contact the clinic through Boomrathart or phone. If you have a critical or abnormal lab result, we will notify you by phone as soon as possible.  Submit refill requests through mobli or call your pharmacy and they will forward the refill request to us. Please allow 3 business days for your refill to be completed.          Additional Information About Your Visit        mobli Information     mobli lets you send messages to your doctor, view your test results, renew your prescriptions, schedule appointments and more. To sign up, go to [email or web address] . Click on \"Log in\" on the left side of the screen, which will take you to the Welcome page. Then click on \"Sign up Now\" on the right side " "of the page.     You will be asked to enter the access code listed below, as well as some personal information. Please follow the directions to create your username and password.     Your access code is: [email or web address]  Expires: 2018  4:50 PM     Your access code will  in 90 days. If you need help or a new code, please call your [facility] or [phone].        Care EveryWhere ID     This is your Care EveryWhere ID. This could be used by other organizations to access your [place] medical records  [id number]        Your Vitals Were     Pulse Temperature Respirations Height Pulse Oximetry BMI (Body Mass Index)    65 98.3  F (36.8  C) (Oral) 13 6' 0.83\" (1.85 m) 98% 27.3 kg/m2       Blood Pressure from Last 3 Encounters:   18 134/66   18 142/88   18 151/89    Weight from Last 3 Encounters:   18 206 lb (93.4 kg)   18 206 lb 8 oz (93.7 kg)   17 230 lb (104.3 kg)              Today, you had the following     No orders found for display         Today's Medication Changes          These changes are accurate as of 18 12:57 PM.  If you have any questions, ask your nurse or doctor.               These medicines have changed or have updated prescriptions.        Dose/Directions    * busPIRone 15 MG tablet   Commonly known as:  BUSPAR   This may have changed:  Another medication with the same name was added. Make sure you understand how and when to take each.   Used for:  KIMANI (generalized anxiety disorder)   Changed by:  Luis E Gill MD        Dose:  15 mg   Take 1 tablet (15 mg) by mouth 2 times daily   Quantity:  180 tablet   Refills:  1       * busPIRone 15 MG tablet   Commonly known as:  BUSPAR   This may have changed:  You were already taking a medication with the same name, and this prescription was added. Make sure you understand how and when to take each.   Used for:  Generalized anxiety disorder   Changed by:  Luis E Gill MD        Dose:  30 " mg   Take 2 tablets (30 mg) by mouth 2 times daily   Quantity:  120 tablet   Refills:  0       * Notice:  This list has 2 medication(s) that are the same as other medications prescribed for you. Read the directions carefully, and ask your doctor or other care provider to review them with you.         Where to get your medicines      These medications were sent to [id number] - [place], [place] - [address]  [address] [facility] [address]    Hours:  24-hours Phone:  [phone]     busPIRone 15 MG tablet                Primary Care Provider Office Phone # Fax #    Luis E Gill [phone] [address] [address]        Equal Access to Services     [facility]: Hadii timmy soriano hadasho Soomaali, waaxda luqadaha, qaybta kaalmada adeegyada, brett hurt haymando bridges . So [facility] [phone].    ATENCIÓN: Si habla español, tiene a smith disposición servicios gratuitos de asistencia lingüística. Llame al [phone].    We comply with applicable federal civil rights laws and [place] laws. We do not discriminate on the basis of race, color, national origin, age, disability, sex, sexual orientation, or gender identity.            Thank you!     Thank you for choosing [facility]  for your care. Our goal is always to provide you with excellent care. Hearing back from our patients is one way we can continue to improve our services. Please take a few minutes to complete the written survey that you may receive in the mail after your visit with us. Thank you!             Your Updated Medication List - Protect others around you: Learn how to safely use, store and throw away your medicines at www.disposemymeds.org.          This list is accurate as of 4/13/18 12:57 PM.  Always use your most recent med list.                   Brand Name Dispense Instructions for use Diagnosis    * busPIRone  15 MG tablet    BUSPAR    180 tablet    Take 1 tablet (15 mg) by mouth 2 times daily    KIMANI (generalized anxiety disorder)       * busPIRone 15 MG tablet    BUSPAR    120 tablet    Take 2 tablets (30 mg) by mouth 2 times daily    Generalized anxiety disorder       levothyroxine 88 MCG tablet    SYNTHROID/LEVOTHROID    90 tablet    Take 1 tablet (88 mcg) by mouth daily    Hypothyroidism due to Hashimoto's thyroiditis       triamcinolone 0.5 % cream    KENALOG    30 g    Apply sparingly to affected area three times daily.    Atopic dermatitis, unspecified type       TYLENOL PO      Take 325 mg by mouth every 6 hours as needed for mild pain or fever        * Notice:  This list has 2 medication(s) that are the same as other medications prescribed for you. Read the directions carefully, and ask your doctor or other care provider to review them with you.

## 2018-05-12 DIAGNOSIS — F41.1 GENERALIZED ANXIETY DISORDER: ICD-10-CM

## 2018-05-14 NOTE — TELEPHONE ENCOUNTER
Contacted patient informed him of message below patient states he will call back later to schedule an appointment when he can look at his calendar.   Thank you   Yara

## 2018-05-15 RX ORDER — BUSPIRONE HYDROCHLORIDE 15 MG/1
TABLET ORAL
Qty: 120 TABLET | Refills: 0 | Status: SHIPPED | OUTPATIENT
Start: 2018-05-15 | End: 2018-08-10

## 2018-07-09 ENCOUNTER — OFFICE VISIT (OUTPATIENT)
Dept: FAMILY MEDICINE | Facility: CLINIC | Age: 58
End: 2018-07-09
Payer: COMMERCIAL

## 2018-07-09 VITALS
HEART RATE: 55 BPM | OXYGEN SATURATION: 98 % | TEMPERATURE: 97.7 F | HEIGHT: 73 IN | DIASTOLIC BLOOD PRESSURE: 72 MMHG | SYSTOLIC BLOOD PRESSURE: 130 MMHG | BODY MASS INDEX: 27.83 KG/M2 | RESPIRATION RATE: 12 BRPM | WEIGHT: 210 LBS

## 2018-07-09 DIAGNOSIS — E03.9 ACQUIRED HYPOTHYROIDISM: ICD-10-CM

## 2018-07-09 DIAGNOSIS — C61 PROSTATE CANCER (H): ICD-10-CM

## 2018-07-09 DIAGNOSIS — F32.9 MAJOR DEPRESSIVE DISORDER WITH SINGLE EPISODE, REMISSION STATUS UNSPECIFIED: Primary | ICD-10-CM

## 2018-07-09 DIAGNOSIS — M79.674 PAIN OF TOE OF RIGHT FOOT: ICD-10-CM

## 2018-07-09 DIAGNOSIS — B35.1 ONYCHOMYCOSIS OF RIGHT GREAT TOE: ICD-10-CM

## 2018-07-09 DIAGNOSIS — Z23 NEED FOR PROPHYLACTIC VACCINATION WITH TETANUS-DIPHTHERIA (TD): ICD-10-CM

## 2018-07-09 DIAGNOSIS — N18.30 CKD (CHRONIC KIDNEY DISEASE) STAGE 3, GFR 30-59 ML/MIN (H): ICD-10-CM

## 2018-07-09 LAB
ANION GAP SERPL CALCULATED.3IONS-SCNC: 5 MMOL/L (ref 3–14)
BUN SERPL-MCNC: 20 MG/DL (ref 7–30)
CALCIUM SERPL-MCNC: 8.8 MG/DL (ref 8.5–10.1)
CHLORIDE SERPL-SCNC: 109 MMOL/L (ref 94–109)
CO2 SERPL-SCNC: 27 MMOL/L (ref 20–32)
CREAT SERPL-MCNC: 1.05 MG/DL (ref 0.66–1.25)
GFR SERPL CREATININE-BSD FRML MDRD: 72 ML/MIN/1.7M2
GLUCOSE SERPL-MCNC: 98 MG/DL (ref 70–99)
POTASSIUM SERPL-SCNC: 4.8 MMOL/L (ref 3.4–5.3)
SODIUM SERPL-SCNC: 141 MMOL/L (ref 133–144)
TSH SERPL DL<=0.005 MIU/L-ACNC: 1.79 MU/L (ref 0.4–4)

## 2018-07-09 PROCEDURE — 80048 BASIC METABOLIC PNL TOTAL CA: CPT | Performed by: FAMILY MEDICINE

## 2018-07-09 PROCEDURE — 84443 ASSAY THYROID STIM HORMONE: CPT | Performed by: FAMILY MEDICINE

## 2018-07-09 PROCEDURE — 90715 TDAP VACCINE 7 YRS/> IM: CPT | Performed by: FAMILY MEDICINE

## 2018-07-09 PROCEDURE — 36415 COLL VENOUS BLD VENIPUNCTURE: CPT | Performed by: FAMILY MEDICINE

## 2018-07-09 PROCEDURE — 90471 IMMUNIZATION ADMIN: CPT | Performed by: FAMILY MEDICINE

## 2018-07-09 PROCEDURE — 99214 OFFICE O/P EST MOD 30 MIN: CPT | Mod: 25 | Performed by: FAMILY MEDICINE

## 2018-07-09 RX ORDER — CICLOPIROX 80 MG/ML
SOLUTION TOPICAL
Qty: 1 BOTTLE | Refills: 1 | Status: SHIPPED | OUTPATIENT
Start: 2018-07-09 | End: 2021-02-15

## 2018-07-09 ASSESSMENT — ANXIETY QUESTIONNAIRES
GAD7 TOTAL SCORE: 7
1. FEELING NERVOUS, ANXIOUS, OR ON EDGE: SEVERAL DAYS
2. NOT BEING ABLE TO STOP OR CONTROL WORRYING: MORE THAN HALF THE DAYS
IF YOU CHECKED OFF ANY PROBLEMS ON THIS QUESTIONNAIRE, HOW DIFFICULT HAVE THESE PROBLEMS MADE IT FOR YOU TO DO YOUR WORK, TAKE CARE OF THINGS AT HOME, OR GET ALONG WITH OTHER PEOPLE: SOMEWHAT DIFFICULT
6. BECOMING EASILY ANNOYED OR IRRITABLE: NOT AT ALL
7. FEELING AFRAID AS IF SOMETHING AWFUL MIGHT HAPPEN: SEVERAL DAYS
5. BEING SO RESTLESS THAT IT IS HARD TO SIT STILL: SEVERAL DAYS
3. WORRYING TOO MUCH ABOUT DIFFERENT THINGS: SEVERAL DAYS

## 2018-07-09 ASSESSMENT — PATIENT HEALTH QUESTIONNAIRE - PHQ9: 5. POOR APPETITE OR OVEREATING: SEVERAL DAYS

## 2018-07-09 NOTE — LETTER
[address] [place] [address]    July 10, 2018    Adriel Elam  [address]  [place] [facility] [address]      Dear Adriel,    normal results    Enclosed is a copy of your results.   Results for orders placed or performed in visit on 07/09/18   TSH WITH FREE T4 REFLEX   Result Value Ref Range    TSH 1.79 0.40 - 4.00 mU/L   Basic metabolic panel   Result Value Ref Range    Sodium 141 133 - 144 mmol/L    Potassium 4.8 3.4 - 5.3 mmol/L    Chloride 109 94 - 109 mmol/L    Carbon Dioxide 27 20 - 32 mmol/L    Anion Gap 5 3 - 14 mmol/L    Glucose 98 70 - 99 mg/dL    Urea Nitrogen 20 7 - 30 mg/dL    Creatinine 1.05 0.66 - 1.25 mg/dL    GFR Estimate 72 >60 mL/min/1.7m2    GFR Estimate If Black 87 >60 mL/min/1.7m2    Calcium 8.8 8.5 - 10.1 mg/dL       If you have any questions or concerns, please call myself or my nurse at [phone].    Sincerely,    Luis E Gill MD/yumi

## 2018-07-09 NOTE — LETTER
My Depression Action Plan  Name: Adriel Elam   Date of Birth 1960  Date: 7/9/2018    My doctor: Luis E Gill   My clinic: [address]  [place] [address]  [phone]          GREEN    ZONE   Good Control    What it looks like:     Things are going generally well. You have normal up s and down s. You may even feel depressed from time to time, but bad moods usually last less than a day.   What you need to do:  1. Continue to care for yourself (see self care plan)  2. Check your depression survival kit and update it as needed  3. Follow your physician s recommendations including any medication.  4. Do not stop taking medication unless you consult with your physician first.           YELLOW         ZONE Getting Worse    What it looks like:     Depression is starting to interfere with your life.     It may be hard to get out of bed; you may be starting to isolate yourself from others.    Symptoms of depression are starting to last most all day and this has happened for several days.     You may have suicidal thoughts but they are not constant.   What you need to do:     1. Call your care team, your response to treatment will improve if you keep your care team informed of your progress. Yellow periods are signs an adjustment may need to be made.     2. Continue your self-care, even if you have to fake it!    3. Talk to someone in your support network    4. Open up your depression survival kit           RED    ZONE Medical Alert - Get Help    What it looks like:     Depression is seriously interfering with your life.     You may experience these or other symptoms: You can t get out of bed most days, can t work or engage in other necessary activities, you have trouble taking care of basic hygiene, or basic responsibilities, thoughts of suicide or death that will not go away, self-injurious behavior.     What you need to do:  1. Call your care team and  request a same-day appointment. If they are not available (weekends or after hours) call your local crisis line, emergency room or 911.            Depression Self Care Plan / Survival Kit    Self-Care for Depression  Here s the deal. Your body and mind are really not as separate as most people think.  What you do and think affects how you feel and how you feel influences what you do and think. This means if you do things that people who feel good do, it will help you feel better.  Sometimes this is all it takes.  There is also a place for medication and therapy depending on how severe your depression is, so be sure to consult with your medical provider and/ or Behavioral Health Consultant if your symptoms are worsening or not improving.     In order to better manage my stress, I will:    Exercise  Get some form of exercise, every day. This will help reduce pain and release endorphins, the  feel good  chemicals in your brain. This is almost as good as taking antidepressants!  This is not the same as joining a gym and then never going! (they count on that by the way ) It can be as simple as just going for a walk or doing some gardening, anything that will get you moving.      Hygiene   Maintain good hygiene (Get out of bed in the morning, Make your bed, Brush your teeth, Take a shower, and Get dressed like you were going to work, even if you are unemployed).  If your clothes don't fit try to get ones that do.    Diet  I will strive to eat foods that are good for me, drink plenty of water, and avoid excessive sugar, caffeine, alcohol, and other mood-altering substances.  Some foods that are helpful in depression are: complex carbohydrates, B vitamins, flaxseed, fish or fish oil, fresh fruits and vegetables.    Psychotherapy  I agree to participate in Individual Therapy (if recommended).    Medication  If prescribed medications, I agree to take them.  Missing doses can result in serious side effects.  I understand that  drinking alcohol, or other illicit drug use, may cause potential side effects.  I will not stop my medication abruptly without first discussing it with my provider.    Staying Connected With Others  I will stay in touch with my friends, family members, and my primary care provider/team.    Use your imagination  Be creative.  We all have a creative side; it doesn t matter if it s oil painting, sand castles, or mud pies! This will also kick up the endorphins.    Witness Beauty  (AKA stop and smell the roses) Take a look outside, even in mid-winter. Notice colors, textures. Watch the squirrels and birds.     Service to others  Be of service to others.  There is always someone else in need.  By helping others we can  get out of ourselves  and remember the really important things.  This also provides opportunities for practicing all the other parts of the program.    Humor  Laugh and be silly!  Adjust your TV habits for less news and crime-drama and more comedy.    Control your stress  Try breathing deep, massage therapy, biofeedback, and meditation. Find time to relax each day.     My support system    Clinic Contact:  Phone number:    Contact 1:  Phone number:    Contact 2:  Phone number:    Synagogue/:  Phone number:    Therapist:  Phone number:    Local crisis center:    Phone number:    Other community support:  Phone number:

## 2018-07-09 NOTE — PROGRESS NOTES
SUBJECTIVE:   Adriel Elam is a 58 year old male who presents to clinic today for the following health issues:    Depression and Anxiety Follow-Up    Status since last visit: Improved for the anxiety, depression comes and goes but has been good lately    Other associated symptoms:None    Complicating factors:     Significant life event: No     Current substance abuse: None    PHQ-9 6/13/2017 7/17/2017   Total Score 2 3   Q9: Suicide Ideation Not at all Not at all     KIMANI-7 SCORE 6/13/2017 7/17/2017   Total Score 8 7   PHQ-9  English  PHQ-9   Any Language  KIMANI-7  Suicide Assessment Five-step Evaluation and Treatment (SAFE-T)    Hypothyroidism Follow-up      Since last visit, patient describes the following symptoms: weight loss of 20 lbs, fatigue and hair loss    Chronic Kidney Disease Follow-up      Current NSAID use?  No      Musculoskeletal problem/pain    Duration: x 3 weeks to 1 month    Description  Location: Right Big Toe    Intensity:  moderate    Accompanying signs and symptoms: numbness, tingling, Discoloration on the toe nail its self     History  Previous similar problem: no   Previous evaluation:  none    Precipitating or alleviating factors:  Trauma or overuse: YES- Maybe Overuse on his feet all day for work  Aggravating factors include: none    Therapies tried and outcome: nothing    Weight:    Lost some weight from last year, work physical and stays active.  Wt Readings from Last 4 Encounters:   07/09/18 210 lb (95.3 kg)   04/13/18 206 lb (93.4 kg)   03/29/18 206 lb 8 oz (93.7 kg)   11/13/17 230 lb (104.3 kg)     Prostate cancer:   Following with urology.   Had radical prostatectomy; 11/2017 and most recent PSA was 0.02      Amount of exercise or physical activity: 1 day/week for an average of 15-30 minutes    Problems taking medications regularly: No    Medication side effects: none    Diet: regular (no restrictions)    Problem list and histories reviewed & adjusted, as indicated.  Additional  "history: as documented    Patient Active Problem List   Diagnosis     Hypothyroidism     Prostate cancer (H)     History reviewed. No pertinent surgical history.    Social History   Substance Use Topics     Smoking status: Never Smoker     Smokeless tobacco: Never Used     Alcohol use 0.0 oz/week     0 Standard drinks or equivalent per week      Comment: Rare      Family History   Problem Relation Age of Onset     Cancer No family hx of      Diabetes No family hx of      Hypertension No family hx of      Cerebrovascular Disease No family hx of      Macular Degeneration No family hx of      Glaucoma No family hx of      Thyroid Disease No family hx of            Reviewed and updated as needed this visit by clinical staff  Tobacco  Allergies  Meds  Med Hx  Surg Hx  Fam Hx  Soc Hx      Reviewed and updated as needed this visit by Provider       ROS:  Constitutional, HEENT, cardiovascular, pulmonary and gi systems are negative, except as otherwise noted.    OBJECTIVE:     /72 (BP Location: Left arm, Patient Position: Chair, Cuff Size: Adult Regular)  Pulse 55  Temp 97.7  F (36.5  C) (Oral)  Resp 12  Ht 6' 0.83\" (1.85 m)  Wt 210 lb (95.3 kg)  SpO2 98%  BMI 27.84 kg/m2  Body mass index is 27.84 kg/(m^2).  GENERAL: healthy, alert and no distress  NECK: no adenopathy and thyroid normal to palpation  RESP: lungs clear to auscultation - no rales, rhonchi or wheezes  CV: regular rate and rhythm, normal S1 S2, no S3 or S4, no murmur, click or rub, no peripheral edema  ABDOMEN: soft, nontender, no masses and bowel sounds normal  MS: no gross musculoskeletal defects noted, no edema  Great Toe: Slight discoloration on nail lateral aspect, early IGTN laterally  Diagnostic Test Results:  none     ASSESSMENT/PLAN:     (F32.9) Major depressive disorder with single episode, remission status unspecified  (primary encounter diagnosis)  Comment: Taking Buspar, main symptom is anxiety; prescribed Buspar 30 mg twice daily " but taking 15 mg twice daily now and hopes to comes off medication as continues to feel better.    (E03.9) Acquired hypothyroidism  Comment: No new symptoms, recheck TSH  Plan: TSH WITH FREE T4 REFLEX    (M79.674) Pain of toe of right foot  Comment: Early IGTN  Plan: Nail care, lift nail daily after soaking     (B35.1) Onychomycosis of right great toe  Comment: Discussed treatment options, would prefer topical treatment  Plan: ciclopirox 8 % SOLN    (C61) Prostate cancer (H)  Comment: Stable after prostatectomy  Plan: Surveillance by urology    (N18.3) CKD (chronic kidney disease) stage 3, GFR 30-59 ml/min  Comment: Avoid nephrotoxic agents, stay well hydrated  Plan: Basic metabolic panel    (Z68.27) BMI 27.0-27.9,adult  Comment: Counseled to make better food choices, exercise as tolerated, and lose weight.     (Z23) Need for prophylactic vaccination with tetanus-diphtheria (TD)  Plan: TDAP VACCINE (ADACEL)    Follow up in 6 months or sooner with concerns    Luis E Gill MD  [facility]

## 2018-07-09 NOTE — NURSING NOTE
"Chief Complaint   Patient presents with     Lab Only     Lab test for Thyroid     Recheck Medication     Musculoskeletal Problem     Right Big Toe      Thyroid Problem     Anxiety     Depression     Initial /72 (BP Location: Left arm, Patient Position: Chair, Cuff Size: Adult Regular)  Pulse 55  Temp 97.7  F (36.5  C) (Oral)  Resp 12  Ht 6' 0.83\" (1.85 m)  Wt 210 lb (95.3 kg)  SpO2 98%  BMI 27.84 kg/m2 Estimated body mass index is 27.84 kg/(m^2) as calculated from the following:    Height as of this encounter: 6' 0.83\" (1.85 m).    Weight as of this encounter: 210 lb (95.3 kg).  BP completed using cuff size: regular    Robert Hancock  "

## 2018-07-09 NOTE — PATIENT INSTRUCTIONS
[facility]    If you have any questions regarding to your visit please contact your care team:       Team Purple:   Clinic Hours Telephone Number   Dr. Chacha Jordan   7am-7pm  Monday - Thursday   7am-5pm  Fridays  [phone]  (Appointment scheduling available 24/7)    Questions about your recent visit?   Team Line:  [phone]   Urgent Care - [place] and [facility] - 11am-9pm Monday-Friday Saturday-Sunday- 9am-5pm   [place] - 5pm-9pm Monday-Friday Saturday-Sunday- 9am-5pm  [phone] - [place]  [phone] - [place]       What options do I have for a visit other than an office visit? We offer electronic visits (e-visits) and telephone visits, when medically appropriate.  Please check with your medical insurance to see if these types of visits are covered, as you will be responsible for any charges that are not paid by your insurance.      You can use Pocket Concierge (secure electronic communication) to access to your chart, send your primary care provider a message, or make an appointment. Ask a team member how to get started.     For a price quote for your services, please call our Consumer Price Line at [phone] or our Imaging Cost estimation line at [phone] (for imaging tests).    Robert Hancock

## 2018-07-09 NOTE — MR AVS SNAPSHOT
After Visit Summary   7/9/2018    Adriel Elam    MRN: [id number]           Patient Information     Date Of Birth          1960        Visit Information        Provider Department      7/9/2018 8:40 AM Luis E Gill MD [facility]        Today's Diagnoses     Major depressive disorder with single episode, remission status unspecified    -  1    Acquired hypothyroidism        Pain of toe of right foot        Onychomycosis of right great toe        Prostate cancer (H)        CKD (chronic kidney disease) stage 3, GFR 30-59 ml/min        BMI 27.0-27.9,adult        Need for prophylactic vaccination with tetanus-diphtheria (TD)          Care Instructions    [facility]    If you have any questions regarding to your visit please contact your care team:       Team Purple:   Clinic Hours Telephone Number   Dr. Chacha Jordan   7am-7pm  Monday - Thursday   7am-5pm  Fridays  [phone]  (Appointment scheduling available 24/7)    Questions about your recent visit?   Team Line:  [phone]   Urgent Care - [place] and [facility] - 11am-9pm Monday-Friday Saturday-Sunday- 9am-5pm   [place] - 5pm-9pm Monday-Friday Saturday-Sunday- 9am-5pm  [phone] - [place]  [phone] [facility]       What options do I have for a visit other than an office visit? We offer electronic visits (e-visits) and telephone visits, when medically appropriate.  Please check with your medical insurance to see if these types of visits are covered, as you will be responsible for any charges that are not paid by your insurance.      You can use Simplist (secure electronic communication) to access to your chart, send your primary care provider a message, or make an appointment. Ask a team member how to get started.     For a price quote for your services, please call our Consumer Price Line at [phone] or our Imaging Cost  "estimation line at [phone] (for imaging tests).    Robert Hancock            Follow-ups after your visit        Your next 10 appointments already scheduled     Sep 04, 2018  4:15 PM CDT   Return Visit with David Jacobson MD   [facility] [place] ([facility] [place])    [address] [place] [address]  [place] [address] [phone]              Who to contact     If you have questions or need follow up information about today's clinic visit or your schedule please contact [facility] directly at [phone].  Normal or non-critical lab and imaging results will be communicated to you by Zubiehart, letter or phone within 4 business days after the clinic has received the results. If you do not hear from us within 7 days, please contact the clinic through Zubiehart or phone. If you have a critical or abnormal lab result, we will notify you by phone as soon as possible.  Submit refill requests through [employer] or call your pharmacy and they will forward the refill request to us. Please allow 3 business days for your refill to be completed.          Additional Information About Your Visit        MyChart Information     [employer] lets you send messages to your doctor, view your test results, renew your prescriptions, schedule appointments and more. To sign up, go to [email or web address] . Click on \"Log in\" on the left side of the screen, which will take you to the Welcome page. Then click on \"Sign up Now\" on the right side of the page.     You will be asked to enter the access code listed below, as well as some personal information. Please follow the directions to create your username and password.     Your access code is: [email or web address]  Expires: 10/7/2018  8:32 AM     Your access code will  in 90 days. If you need help or a new code, please call your [facility] or [phone].        Care EveryWhere ID     This is your Care EveryWhere ID. This could be used by other organizations " "to access your [place] medical records  [id number]        Your Vitals Were     Pulse Temperature Respirations Height Pulse Oximetry BMI (Body Mass Index)    55 97.7  F (36.5  C) (Oral) 12 6' 0.83\" (1.85 m) 98% 27.84 kg/m2       Blood Pressure from Last 3 Encounters:   07/09/18 130/72   04/13/18 134/66   03/29/18 142/88    Weight from Last 3 Encounters:   07/09/18 210 lb (95.3 kg)   04/13/18 206 lb (93.4 kg)   03/29/18 206 lb 8 oz (93.7 kg)              We Performed the Following     Basic metabolic panel     TDAP VACCINE (ADACEL)     TSH WITH FREE T4 REFLEX          Today's Medication Changes          These changes are accurate as of 7/9/18  9:18 AM.  If you have any questions, ask your nurse or doctor.               Start taking these medicines.        Dose/Directions    ciclopirox 8 % Soln   Used for:  Onychomycosis of right great toe   Started by:  Luis E Gill MD        Apply to adjacent skin and affected nails daily.  Remove with alcohol every 7 days, then repeat.   Quantity:  1 Bottle   Refills:  1            Where to get your medicines      [facility] [address] [place] [address]  [address] [facility] [address]    Hours:  24-hours Phone:  [phone]     ciclopirox 8 % Soln                Primary Care Provider Office Phone # Fax #    Luis E Gill [phone]       [address] [facility] [address]        Equal Access to Services     [facility] AH: Hadii timmy kauffman Soalexys, waaxda luqadaha, qaybta kaalmada adeegmichada, brett snow. So [facility] [place] [phone].    ATENCIÓN: Si habla español, tiene a smith disposición servicios gratuitos de asistencia lingüística. Llame al [phone].    We comply with applicable federal civil rights laws and [place] laws. We do not discriminate on the basis of race, color, national origin, age, " disability, sex, sexual orientation, or gender identity.            Thank you!     Thank you for choosing [facility]  for your care. Our goal is always to provide you with excellent care. Hearing back from our patients is one way we can continue to improve our services. Please take a few minutes to complete the written survey that you may receive in the mail after your visit with us. Thank you!             Your Updated Medication List - Protect others around you: Learn how to safely use, store and throw away your medicines at www.disposemymeds.org.          This list is accurate as of 7/9/18  9:18 AM.  Always use your most recent med list.                   Brand Name Dispense Instructions for use Diagnosis    busPIRone 15 MG tablet    BUSPAR    120 tablet    TAKE 2 TABLETS(30 MG) BY MOUTH TWICE DAILY    Generalized anxiety disorder       ciclopirox 8 % Soln     1 Bottle    Apply to adjacent skin and affected nails daily.  Remove with alcohol every 7 days, then repeat.    Onychomycosis of right great toe       levothyroxine 88 MCG tablet    SYNTHROID/LEVOTHROID    90 tablet    Take 1 tablet (88 mcg) by mouth daily    Hypothyroidism due to Hashimoto's thyroiditis       triamcinolone 0.5 % cream    KENALOG    30 g    Apply sparingly to affected area three times daily.    Atopic dermatitis, unspecified type       TYLENOL PO      Take 325 mg by mouth every 6 hours as needed for mild pain or fever

## 2018-07-10 ASSESSMENT — ANXIETY QUESTIONNAIRES: GAD7 TOTAL SCORE: 7

## 2018-07-10 ASSESSMENT — PATIENT HEALTH QUESTIONNAIRE - PHQ9: SUM OF ALL RESPONSES TO PHQ QUESTIONS 1-9: 6

## 2018-08-09 ENCOUNTER — TELEPHONE (OUTPATIENT)
Dept: FAMILY MEDICINE | Facility: CLINIC | Age: 58
End: 2018-08-09

## 2018-08-09 DIAGNOSIS — F41.1 GENERALIZED ANXIETY DISORDER: ICD-10-CM

## 2018-08-09 NOTE — TELEPHONE ENCOUNTER
Patient calling with update that his anxiety and depression.  He has symptoms every day now, and is feeling more anxious and depressed.  He takes 1.5 tabs (22.5mg) of Buspar BID, he had hoped to decrease the dose but symptoms have been worse so he is wondering if he needs another medication?  Please advise   Britni Sow RN

## 2018-08-09 NOTE — TELEPHONE ENCOUNTER
Reason for call:  Patient reporting a symptom    Symptom or request: Anxiety, depression    Duration (how long have symptoms been present): On going     Have you been treated for this before? Yes    Additional comments: Patient is having a lot of anxiety and depression right now and would like to talk about medication. Please call today. Thank you    Phone Number patient can be reached at:  Work number on file:  [phone] (work)    Best Time:  ASAP    Can we leave a detailed message on this number:  YES    Call taken on 8/9/2018 at 2:10 PM by Amy English

## 2018-08-10 ENCOUNTER — NURSE TRIAGE (OUTPATIENT)
Dept: NURSING | Facility: CLINIC | Age: 58
End: 2018-08-10

## 2018-08-10 RX ORDER — BUSPIRONE HYDROCHLORIDE 15 MG/1
TABLET ORAL
Qty: 120 TABLET | Refills: 0 | Status: SHIPPED | OUTPATIENT
Start: 2018-08-10 | End: 2018-09-09

## 2018-08-10 NOTE — TELEPHONE ENCOUNTER
Attempted call to pt. No answer. Unable to leave a message. Will need to try again later, or await call back.    Haydee Pemberton RN  [facility]

## 2018-08-10 NOTE — TELEPHONE ENCOUNTER
"Patient calling to change the appointment he made. He feels that his buspar is \"not working\" as well as he would like. He says none of his meds are \"working\". For instance his Levothyroxine is \"not working well\" but he can't specify in what way it is not working. Transferred to scheduling to get an earlier appointment than 8/24.  Martha Redd RN  [facility]    "

## 2018-08-10 NOTE — TELEPHONE ENCOUNTER
Patient updated that appointment is recommended to discuss options.  Patient verbalized understanding.  He stated that he feels better today and thinks an appointment can wait a couple of weeks  He requested an appointment for 8/24/18 and a refill to last him until then  He will continue same meds as prescribed until then    Refill sent    Lena Rausch RN

## 2018-08-13 ENCOUNTER — OFFICE VISIT (OUTPATIENT)
Dept: FAMILY MEDICINE | Facility: CLINIC | Age: 58
End: 2018-08-13
Payer: COMMERCIAL

## 2018-08-13 VITALS
BODY MASS INDEX: 27.25 KG/M2 | OXYGEN SATURATION: 97 % | HEIGHT: 73 IN | TEMPERATURE: 98.5 F | SYSTOLIC BLOOD PRESSURE: 138 MMHG | HEART RATE: 65 BPM | DIASTOLIC BLOOD PRESSURE: 80 MMHG | RESPIRATION RATE: 14 BRPM | WEIGHT: 205.6 LBS

## 2018-08-13 DIAGNOSIS — F41.1 GENERALIZED ANXIETY DISORDER: Primary | ICD-10-CM

## 2018-08-13 PROCEDURE — 99213 OFFICE O/P EST LOW 20 MIN: CPT | Performed by: FAMILY MEDICINE

## 2018-08-13 ASSESSMENT — ANXIETY QUESTIONNAIRES
1. FEELING NERVOUS, ANXIOUS, OR ON EDGE: MORE THAN HALF THE DAYS
3. WORRYING TOO MUCH ABOUT DIFFERENT THINGS: MORE THAN HALF THE DAYS
7. FEELING AFRAID AS IF SOMETHING AWFUL MIGHT HAPPEN: MORE THAN HALF THE DAYS
IF YOU CHECKED OFF ANY PROBLEMS ON THIS QUESTIONNAIRE, HOW DIFFICULT HAVE THESE PROBLEMS MADE IT FOR YOU TO DO YOUR WORK, TAKE CARE OF THINGS AT HOME, OR GET ALONG WITH OTHER PEOPLE: VERY DIFFICULT
5. BEING SO RESTLESS THAT IT IS HARD TO SIT STILL: MORE THAN HALF THE DAYS
6. BECOMING EASILY ANNOYED OR IRRITABLE: NOT AT ALL
GAD7 TOTAL SCORE: 10
2. NOT BEING ABLE TO STOP OR CONTROL WORRYING: MORE THAN HALF THE DAYS

## 2018-08-13 ASSESSMENT — PATIENT HEALTH QUESTIONNAIRE - PHQ9: 5. POOR APPETITE OR OVEREATING: NOT AT ALL

## 2018-08-13 NOTE — MR AVS SNAPSHOT
After Visit Summary   8/13/2018    Adriel Elam    MRN: [id number]           Patient Information     Date Of Birth          1960        Visit Information        Provider Department      8/13/2018 6:40 PM Luis E Gill MD [facility]        Today's Diagnoses     Generalized anxiety disorder    -  1      Care Instructions    [facility]    If you have any questions regarding to your visit please contact your care team:       Team Purple:   Clinic Hours Telephone Number   Dr. Chacha Jordan   7am-7pm  Monday - Thursday   7am-5pm  Fridays  [phone]  (Appointment scheduling available 24/7)    Questions about your recent visit?   Team Line:  [phone]   Urgent Care - [place] and [facility] - 11am-9pm Monday-Friday Saturday-Sunday- 9am-5pm   [place] - 5pm-9pm Monday-Friday Saturday-Sunday- 9am-5pm  [phone] - [place]  [phone] [facility]       What options do I have for a visit other than an office visit? We offer electronic visits (e-visits) and telephone visits, when medically appropriate.  Please check with your medical insurance to see if these types of visits are covered, as you will be responsible for any charges that are not paid by your insurance.      You can use Albumatic (secure electronic communication) to access to your chart, send your primary care provider a message, or make an appointment. Ask a team member how to get started.     For a price quote for your services, please call our Consumer Price Line at [phone] or our Imaging Cost estimation line at [phone] (for imaging tests).    Robert Hancock            Follow-ups after your visit        Follow-up notes from your care team     Return in about 1 month (around 9/13/2018).      Your next 10 appointments already scheduled     Sep 04, 2018  4:15 PM CDT   Return Visit with David Jacobson MD   [facility] [place] ([facility])    [address]  [place] [address] [phone]            2019  4:40 PM CST   Office Visit with Luis E Gill MD   [facility] [place] ([facility])    [address] [place] [address]   [phone]           Bring a current list of meds and any records pertaining to this visit. For Physicals, please bring immunization records and any forms needing to be filled out. Please arrive 10 minutes early to complete paperwork.              Who to contact     If you have questions or need follow up information about today's clinic visit or your schedule please contact [facility] directly at [phone].  Normal or non-critical lab and imaging results will be communicated to you by Cobookhart, letter or phone within 4 business days after the clinic has received the results. If you do not hear from us within 7 days, please contact the clinic through Cobookhart or phone. If you have a critical or abnormal lab result, we will notify you by phone as soon as possible.  Submit refill requests through [facility] or call your pharmacy and they will forward the refill request to us. Please allow 3 business days for your refill to be completed.          Additional Information About Your Visit        CobookharKickboard Information     [facility] lets you send messages to your doctor, view your test results, renew your prescriptions, schedule appointments and more. To sign up, go to [email or web address] . Click on \"Log in\" on the left side of the screen, which will take you to the Welcome page. Then click on \"Sign up Now\" on the right side of the page.     You will be asked to enter the access code listed below, as well as some personal information. Please follow the directions to create your username and password.     Your access code is: [email or web address]  Expires: 2018  6:31 PM     Your access code will  in 90 days. If you need " "help or a new code, please call your [place] clinic or [phone].        Care EveryWhere ID     This is your Care EveryWhere ID. This could be used by other organizations to access your [place] medical records  [id number]        Your Vitals Were     Pulse Temperature Respirations Height Pulse Oximetry BMI (Body Mass Index)    65 98.5  F (36.9  C) (Oral) 14 6' 0.83\" (1.85 m) 97% 27.25 kg/m2       Blood Pressure from Last 3 Encounters:   08/13/18 144/86   07/09/18 130/72   04/13/18 134/66    Weight from Last 3 Encounters:   08/13/18 205 lb 9.6 oz (93.3 kg)   07/09/18 210 lb (95.3 kg)   04/13/18 206 lb (93.4 kg)              Today, you had the following     No orders found for display         Today's Medication Changes          These changes are accurate as of 8/13/18  7:14 PM.  If you have any questions, ask your nurse or doctor.               Start taking these medicines.        Dose/Directions    sertraline 50 MG tablet   Commonly known as:  ZOLOFT   Used for:  Generalized anxiety disorder   Started by:  Luis E Gill MD        Take 1/2 tablet (25 mg) for 1 week, then increase to 1 tablet orally daily   Quantity:  30 tablet   Refills:  0            Where to get your medicines      These medications were sent to [facility] [address] [place] [address] [address]  [address] [address]    Hours:  24-hours Phone:  [phone]     sertraline 50 MG tablet                Primary Care Provider Office Phone # Fax #    Luis E Gill [phone] [address] [address]        Equal Access to Services     HEATHER BANUELOS AH: Nelda kauffman Soalexys, waaxda luqadaha, qaybta kaalmajudy smith, brett snow. [facility] [phone].    ATENCIÓN: Si habla español, tiene a smith disposición servicios gratuitos de asistencia lingüística. Llame al [phone].    We comply " with applicable federal civil rights laws and [place] laws. We do not discriminate on the basis of race, color, national origin, age, disability, sex, sexual orientation, or gender identity.            Thank you!     Thank you for choosing [facility]  for your care. Our goal is always to provide you with excellent care. Hearing back from our patients is one way we can continue to improve our services. Please take a few minutes to complete the written survey that you may receive in the mail after your visit with us. Thank you!             Your Updated Medication List - Protect others around you: Learn how to safely use, store and throw away your medicines at www.disposemymeds.org.          This list is accurate as of 8/13/18  7:14 PM.  Always use your most recent med list.                   Brand Name Dispense Instructions for use Diagnosis    busPIRone 15 MG tablet    BUSPAR    120 tablet    TAKE 2 TABLETS(30 MG) BY MOUTH TWICE DAILY    Generalized anxiety disorder       ciclopirox 8 % Soln     1 Bottle    Apply to adjacent skin and affected nails daily.  Remove with alcohol every 7 days, then repeat.    Onychomycosis of right great toe       levothyroxine 88 MCG tablet    SYNTHROID/LEVOTHROID    90 tablet    Take 1 tablet (88 mcg) by mouth daily    Hypothyroidism due to Hashimoto's thyroiditis       sertraline 50 MG tablet    ZOLOFT    30 tablet    Take 1/2 tablet (25 mg) for 1 week, then increase to 1 tablet orally daily    Generalized anxiety disorder       triamcinolone 0.5 % cream    KENALOG    30 g    Apply sparingly to affected area three times daily.    Atopic dermatitis, unspecified type       TYLENOL PO      Take 325 mg by mouth every 6 hours as needed for mild pain or fever

## 2018-08-13 NOTE — NURSING NOTE
"Chief Complaint   Patient presents with     Depression     Anxiety     Initial /86 (BP Location: Left arm, Patient Position: Chair, Cuff Size: Adult Regular)  Pulse 65  Temp 98.5  F (36.9  C) (Oral)  Resp 14  Ht 6' 0.83\" (1.85 m)  Wt 205 lb 9.6 oz (93.3 kg)  SpO2 97%  BMI 27.25 kg/m2 Estimated body mass index is 27.25 kg/(m^2) as calculated from the following:    Height as of this encounter: 6' 0.83\" (1.85 m).    Weight as of this encounter: 205 lb 9.6 oz (93.3 kg).  BP completed using cuff size: regular    Robert Hancock  "

## 2018-08-13 NOTE — PROGRESS NOTES
SUBJECTIVE:   Adriel Elam is a 58 year old male who presents to clinic today for the following health issues:    Depression and Anxiety Follow-Up    Status since last visit: Worsened last week, but now has been alittle better has been taking more of the buspar. He had tried to cut down to 15 mg twice a day when the symptoms started escalating. He restarted 30 mg twice a day 3 days ago. He however felt that was not lasting long enough especially the morning dose even with 30 mg.    Other associated symptoms:None    Complicating factors:     Significant life event: No     Current substance abuse: None    PHQ-9 6/13/2017 7/17/2017 7/9/2018   Total Score 2 3 6   Q9: Suicide Ideation Not at all Not at all Not at all     KIMANI-7 SCORE 6/13/2017 7/17/2017 7/9/2018   Total Score 8 7 7    PHQ-9  English  PHQ-9   Any Language  KIMANI-7  Suicide Assessment Five-step Evaluation and Treatment (SAFE-T)      Amount of exercise or physical activity: None    Problems taking medications regularly: problems with dose, and what to take.     Medication side effects: none    Diet: regular (no restrictions)     Problem list and histories reviewed & adjusted, as indicated.  Additional history: as documented    Patient Active Problem List   Diagnosis     Hypothyroidism     Prostate cancer (H)     History reviewed. No pertinent surgical history.    Social History   Substance Use Topics     Smoking status: Never Smoker     Smokeless tobacco: Never Used     Alcohol use 0.0 oz/week     0 Standard drinks or equivalent per week      Comment: Rare      Family History   Problem Relation Age of Onset     Cancer No family hx of      Diabetes No family hx of      Hypertension No family hx of      Cerebrovascular Disease No family hx of      Macular Degeneration No family hx of      Glaucoma No family hx of      Thyroid Disease No family hx of          Reviewed and updated as needed this visit by clinical staff  Tobacco  Allergies  Meds  Med Hx   "Surg Hx  Fam Hx  Soc Hx      ROS:  Constitutional, HEENT, cardiovascular, pulmonary, gi and gu systems are negative, except as otherwise noted.    OBJECTIVE:     /86 (BP Location: Left arm, Patient Position: Chair, Cuff Size: Adult Regular)  Pulse 65  Temp 98.5  F (36.9  C) (Oral)  Resp 14  Ht 6' 0.83\" (1.85 m)  Wt 205 lb 9.6 oz (93.3 kg)  SpO2 97%  BMI 27.25 kg/m2  Body mass index is 27.25 kg/(m^2).  GENERAL: healthy, alert and no distress  HENT: ear canals and TM's normal, nose and mouth without ulcers or lesions  NECK: no adenopathy  and thyroid normal to palpation  RESP: lungs clear to auscultation - no rales, rhonchi or wheezes  CV: regular rate and rhythm, normal S1 S2, no S3 or S4, no murmur, click or rub.  ABDOMEN: soft, nontender, no masses and bowel sounds normal  MS: no gross musculoskeletal defects noted, no edema  NEURO: Normal strength and tone, mentation intact and speech normal  PSYCH: mentation appears normal, affect normal/bright    Diagnostic Test Results:  none     ASSESSMENT/PLAN:     (F41.1) Generalized anxiety disorder  (primary encounter diagnosis)  Comment: Given still experiencing symptoms discussed adding a second medication; zoloft.  Plan: sertraline (ZOLOFT) 50 MG tablet    Follow up in 1 month or sooner with concerns    Luis E Gill MD  [facility]  "

## 2018-08-14 ENCOUNTER — TELEPHONE (OUTPATIENT)
Dept: FAMILY MEDICINE | Facility: CLINIC | Age: 58
End: 2018-08-14

## 2018-08-14 ASSESSMENT — PATIENT HEALTH QUESTIONNAIRE - PHQ9: SUM OF ALL RESPONSES TO PHQ QUESTIONS 1-9: 11

## 2018-08-14 ASSESSMENT — ANXIETY QUESTIONNAIRES: GAD7 TOTAL SCORE: 10

## 2018-08-14 NOTE — PATIENT INSTRUCTIONS
[facility]    If you have any questions regarding to your visit please contact your care team:       Team Purple:   Clinic Hours Telephone Number   Dr. Chacha Jordan   7am-7pm  Monday - Thursday   7am-5pm  Fridays  [phone]  (Appointment scheduling available 24/7)    Questions about your recent visit?   Team Line:  [phone]   Urgent Care - [place] and [facility] - 11am-9pm Monday-Friday Saturday-Sunday- 9am-5pm   [place] - 5pm-9pm Monday-Friday Saturday-Sunday- 9am-5pm  [phone] - [place]  [phone] - [place]       What options do I have for a visit other than an office visit? We offer electronic visits (e-visits) and telephone visits, when medically appropriate.  Please check with your medical insurance to see if these types of visits are covered, as you will be responsible for any charges that are not paid by your insurance.      You can use Actimize (secure electronic communication) to access to your chart, send your primary care provider a message, or make an appointment. Ask a team member how to get started.     For a price quote for your services, please call our Consumer Price Line at [phone] or our Imaging Cost estimation line at [phone] (for imaging tests).    Robert Hancock

## 2018-08-14 NOTE — TELEPHONE ENCOUNTER
Reason for Call:  Other prescription    Detailed comments: Patient calling was prescribed sertraline (ZOLOFT) 50 MG tablet and is wanting to start now, but should he take it at night or in the morning? What is the recommendation? Please call tonight if at all possible. Thank you    Phone Number Patient can be reached at: Home number on file [phone] (home)    Best Time: ASAP     Can we leave a detailed message on this number? YES    Call taken on 8/14/2018 at 6:39 PM by Amy English

## 2018-08-14 NOTE — TELEPHONE ENCOUNTER
Unable to get through to pharmacy to verify with pharmacist. Insomnia and fatigue are listed as side effects. Routing to provider to advise.    Haydee Pemberton RN  [facility]

## 2018-08-15 NOTE — TELEPHONE ENCOUNTER
Called home number but someone picked up stating that patient was not home  Called mobile number listed which is the same as the work number listed.    Advised patient to take Sertraline in the morning.  Patient verbalized understanding.    He is also asking if it would be ok to have a beer once in a while on his current meds    Will route to [facility] pharmacy for further advice  Please route back to LES RN TRIAGE POOL [[phone]]    Lena Rausch RN

## 2018-08-15 NOTE — TELEPHONE ENCOUNTER
Tameka Moss, [facility]  P Fz Rn Triage Pool        Caller: Unspecified (Yesterday,  6:36 PM)                     Occasional use of alcohol in moderation while taking sertraline is OK.     I also agree with taking it in the morning.  For most patients this works best. If it does make him tired or drowsy he could move it to bedtime instead.     Rachele Moss PharmD   CF Medication Therapy Management Pharmacist   [facility]   [phone]       Patient notified of providers message as written.   Patient verbalized understanding and has no further questions or concerns.    Patricia Nicholas, LEONELA - BC

## 2018-08-29 DIAGNOSIS — C61 PROSTATE CANCER (H): ICD-10-CM

## 2018-08-29 PROCEDURE — 36415 COLL VENOUS BLD VENIPUNCTURE: CPT | Performed by: UROLOGY

## 2018-08-29 PROCEDURE — 84153 ASSAY OF PSA TOTAL: CPT | Performed by: UROLOGY

## 2018-08-30 LAB — PSA SERPL-MCNC: 0.06 UG/L (ref 0–4)

## 2018-09-09 DIAGNOSIS — F41.1 GENERALIZED ANXIETY DISORDER: ICD-10-CM

## 2018-09-10 RX ORDER — BUSPIRONE HYDROCHLORIDE 15 MG/1
TABLET ORAL
Qty: 120 TABLET | Refills: 0 | Status: SHIPPED | OUTPATIENT
Start: 2018-09-10 | End: 2019-05-31 | Stop reason: ALTCHOICE

## 2018-09-11 ENCOUNTER — OFFICE VISIT (OUTPATIENT)
Dept: UROLOGY | Facility: CLINIC | Age: 58
End: 2018-09-11
Payer: COMMERCIAL

## 2018-09-11 VITALS — RESPIRATION RATE: 12 BRPM | HEART RATE: 52 BPM

## 2018-09-11 DIAGNOSIS — C61 PROSTATE CANCER (H): Primary | ICD-10-CM

## 2018-09-11 PROCEDURE — 99213 OFFICE O/P EST LOW 20 MIN: CPT | Performed by: UROLOGY

## 2018-09-11 RX ORDER — SILDENAFIL CITRATE 20 MG/1
TABLET ORAL
Qty: 15 TABLET | Refills: 11 | Status: SHIPPED | OUTPATIENT
Start: 2018-09-11 | End: 2023-07-20

## 2018-09-11 NOTE — PROGRESS NOTES
Chief Complaint   Patient presents with     RECHECK       Adriel Elam is a 58 year old male who presents today for follow up of   Chief Complaint   Patient presents with     RECHECK   He is without any complaints.  Recent psa is 0.06.  He has no urinary issues.   He has partial erections but good enough for penetration.    Current Outpatient Prescriptions   Medication Sig Dispense Refill     busPIRone (BUSPAR) 15 MG tablet TAKE 2 TABLETS(30 MG) BY MOUTH TWICE DAILY 120 tablet 0     ciclopirox 8 % SOLN Apply to adjacent skin and affected nails daily.  Remove with alcohol every 7 days, then repeat. 1 Bottle 1     levothyroxine (SYNTHROID/LEVOTHROID) 88 MCG tablet Take 1 tablet (88 mcg) by mouth daily 90 tablet 1     sertraline (ZOLOFT) 50 MG tablet TAKE 1/2 TABLET FOR 1 WEEK, THEN INCREASE TO 1 TABLET BY MOUTH DAILY. 30 tablet 0     Acetaminophen (TYLENOL PO) Take 325 mg by mouth every 6 hours as needed for mild pain or fever       triamcinolone (KENALOG) 0.5 % cream Apply sparingly to affected area three times daily. (Patient not taking: Reported on 9/11/2018) 30 g 1     No Known Allergies   Past Medical History:   Diagnosis Date     Hypothyroidism      No past surgical history on file.  Family History   Problem Relation Age of Onset     Cancer No family hx of      Diabetes No family hx of      Hypertension No family hx of      Cerebrovascular Disease No family hx of      Macular Degeneration No family hx of      Glaucoma No family hx of      Thyroid Disease No family hx of      Social History     Social History     Marital status: Single     Spouse name: N/A     Number of children: N/A     Years of education: N/A     Social History Main Topics     Smoking status: Never Smoker     Smokeless tobacco: Never Used     Alcohol use 0.0 oz/week     0 Standard drinks or equivalent per week      Comment: Rare      Drug use: No     Sexual activity: No     Other Topics Concern     Parent/Sibling W/ Cabg, Mi Or Angioplasty  Before 65f 55m? No     Social History Narrative       REVIEW OF SYSTEMS  =================  C: NEGATIVE for fever, chills, change in weight  I: NEGATIVE for worrisome rashes, moles or lesions  E/M: NEGATIVE for ear, mouth and throat problems  R: NEGATIVE for significant cough or SHORTNESS OF BREATH,   CV: NEGATIVE for chest pain, palpitations or peripheral edema  GI: NEGATIVE for nausea, abdominal pain, heartburn, or change in bowel habits  NEURO: NEGATIVE any motor/sensory changes  PSYCH: NEGATIVE for recent mood disorder    Physical Exam:  Pulse 52  Resp 12   Patient is pleasant, in no acute distress, good general condition.  Lung: no evidence of respiratory distress    Abdomen: Soft, nondistended, non tender. No masses. No rebound or guarding.   Exam: incision c/d/i  Skin: Warm and dry.  No redness.  Psych: normal mood and affect  Neuro: alert and oriented  Final Diagnosis Prostate, radical prostatectomy- Adenocarcinoma. See synoptic report below.    Procedure:  Radical prostatectomy  Prostate size:  48 gm, 4.6 x 3.2 x 3.2 cm  Histologic type: Adenocarcinoma (acinar, not otherwise specified)  Histologic grade: Evita pattern   Primary pattern:  4   Secondary pattern:  3   Total Evita score:  7       Grade group:  3   Indraductal carcinoma:  Not identified  Tumor quantitation:  Estimated 15% of prostate involved; dominant nodule 1.7 x 0.6 cm  Extraprostatic extension:  Present, non-focal, left posterior mid, left posterior superior, right inferior  Urinary bladder neck invasion:  Not identified  Seminal vesicle invasion:  Not identified  Margins:  Uninvolved  Treatment effect:  Not identified     Lymphovascular invasion:  Not identified  Perineural invasion:  Present  Regional lymph nodes:  No lymph nodes submitted  Tumor block(s) for molecular/send out tests: A10  Pathologic Stage (AJCC 8th ed): pT3aNX        Clinical Information Prostate cancer     Gross Description Prostate: Is a 48 g radical  prostatectomy specimen with the following dimensions: 3.2 cm (superior-inferior), 4.6 cm (right-left), 3.2 cm (anterior-posterior). The seminal vesicles and vas deferens are detached from the gland and additionally received within in the container measuring 5.1 x 3.0 x 1.2 cm in aggregate. Right versus left cannot be ascertained. The specimen is inked as follows: posterior-black, right-yellow, left-blue. The apex and base margins are shaved and submitted perpendicularly. There is an ill-defined tan-white firm area within the left posterior mid prostate measuring 1.6 x 0.6 x 0.3 cm. There is focal yellow stippling with in the right anterior inferior prostate. There is no evidence of nodular hypertrophy.  Representative sections are submitted as follows:  A1-right apex margin  A2-left apex margin  A3-right base margin  A 4-left base margin  A5-right inferior (anterior and posterior)  A6-left inferior (anterior and posterior)  A7-right posterior mid  A8-left posterior mid  A9-right posterior superior  A10-left posterior superior  A11-right seminal vesicle stump to gland  A12-left seminal vesicle stump to gland   A13 and A14 representative sections of detached undesignated seminal vesicles (CM)         Assessment/Plan:   (C61) Prostate cancer (H)  (primary encounter diagnosis)  Comment:   Group 3 prostate cancer.  Plan: recheck psa in 6 months    Male ED:  Trial of viagra.  Side effects discussed.

## 2018-10-11 DIAGNOSIS — F41.1 GENERALIZED ANXIETY DISORDER: ICD-10-CM

## 2018-10-11 DIAGNOSIS — E06.3 HYPOTHYROIDISM DUE TO HASHIMOTO'S THYROIDITIS: ICD-10-CM

## 2018-10-12 RX ORDER — LEVOTHYROXINE SODIUM 88 UG/1
TABLET ORAL
Qty: 90 TABLET | Refills: 0 | Status: SHIPPED | OUTPATIENT
Start: 2018-10-12 | End: 2019-02-13

## 2019-02-13 DIAGNOSIS — E06.3 HYPOTHYROIDISM DUE TO HASHIMOTO'S THYROIDITIS: ICD-10-CM

## 2019-02-14 RX ORDER — LEVOTHYROXINE SODIUM 88 UG/1
TABLET ORAL
Qty: 90 TABLET | Refills: 1 | Status: SHIPPED | OUTPATIENT
Start: 2019-02-14 | End: 2019-09-17

## 2019-05-29 ENCOUNTER — NURSE TRIAGE (OUTPATIENT)
Dept: NURSING | Facility: CLINIC | Age: 59
End: 2019-05-29

## 2019-05-29 NOTE — TELEPHONE ENCOUNTER
"Patient calling with concerns because his urine is much clearer than it was \"growing up.\"  He was told by PCP to drink more water to help his kidneys.  Advised that he was doing well if his urine was more clear.  He also said a month ago his urine seemed cloudy one night.  But has been fine since.  Caller has appointment with PCP this Friday, and recommended he talk to his PCP about his concerns.    Rocio Harmon RN  [facility]        Reason for Disposition    All other urine symptoms    Additional Information    Negative: [1] Unable to urinate (or only a few drops) > 4 hours AND     [2] bladder feels very full (e.g., palpable bladder or strong urge to urinate)    Negative: [1] Decreased urination and [2] drinking very little AND [2] dehydration suspected (e.g., dark urine, no urine > 12 hours, very dry mouth, very lightheaded)    Negative: Patient sounds very sick or weak to the triager    Negative: Fever > 100.5 F (38.1 C)    Negative: Shock suspected (e.g., cold/pale/clammy skin, too weak to stand, low BP, rapid pulse)    Negative: Sounds like a life-threatening emergency to the triager    Negative: Side (flank) or lower back pain present    Negative: [1] Can't control passage of urine (i.e., urinary incontinence) AND [2] new onset (< 2 weeks) or worsening    Negative: Urinating more frequently than usual (i.e., frequency)    Negative: Bad or foul-smelling urine    Protocols used: URINARY SYMPTOMS-A-AH      "

## 2019-05-30 ENCOUNTER — NURSE TRIAGE (OUTPATIENT)
Dept: NURSING | Facility: CLINIC | Age: 59
End: 2019-05-30

## 2019-05-30 NOTE — TELEPHONE ENCOUNTER
Reason for Disposition    All other urine symptoms    Additional Information    Negative: Shock suspected (e.g., cold/pale/clammy skin, too weak to stand, low BP, rapid pulse)    Negative: Sounds like a life-threatening emergency to the triager    Negative: Followed a genital area injury    Negative: Followed a genital area injury (penis, scrotum)    Negative: Vaginal discharge    Negative: Pus (white, yellow) or bloody discharge from end of penis    Negative: [1] Taking antibiotic for urinary tract infection (UTI) AND [2] female    Negative: [1] Taking antibiotic for urinary tract infection (UTI) AND [2] male    Negative: [1] Discomfort (pain, burning or stinging) when passing urine AND [2] pregnant    Negative: [1] Discomfort (pain, burning or stinging) when passing urine AND [2] postpartum < 1 month    Negative: [1] Discomfort (pain, burning or stinging) when passing urine AND [2] female    Negative: [1] Discomfort (pain, burning or stinging) when passing urine AND [2] male    Negative: Pain or itching in the vulvar area    Negative: Pain in scrotum is main symptom    Negative: Blood in the urine is main symptom    Negative: Symptoms arising from use of a urinary catheter (Burns or Coude)    Negative: [1] Unable to urinate (or only a few drops) > 4 hours AND     [2] bladder feels very full (e.g., palpable bladder or strong urge to urinate)    Negative: [1] Decreased urination and [2] drinking very little AND [2] dehydration suspected (e.g., dark urine, no urine > 12 hours, very dry mouth, very lightheaded)    Negative: Patient sounds very sick or weak to the triager    Negative: Side (flank) or lower back pain present    Negative: [1] Can't control passage of urine (i.e., urinary incontinence) AND [2] new onset (< 2 weeks) or worsening    Negative: Urinating more frequently than usual (i.e., frequency)    Negative: Bad or foul-smelling urine    Negative: [1] Can't control passage of urine (i.e., urinary  incontinence, wetting self) AND [2] present > 2 weeks    Negative: Urination is difficult to start (i.e., hesitancy) or straining    Negative: Dribbling (losing urine) just after finishing urination (i.e., post-void dribbling)    Negative: Has to get out of bed to urinate > 2 times a night (i.e., nocturia)    Protocols used: URINARY SYMPTOMS-A-    Patient with lower back pain which he believes may be related to some urinary symptoms that he had off and on for the past few months.  Patient reports that his urine is clear at this time and denies any discomfort with urination.  Patient was advised to see his primary care physician within two weeks per guideline.  Patient already has an appointment set for tomorrow.  Patient questions if there is anything else he can take to ease his pain.  Writer advised that patient could try over the counter medications such as tylenol or ibuprofen or ice and heat.  Patient verbalized understanding.    Brittney Kelly RN  [facility]

## 2019-05-31 ENCOUNTER — OFFICE VISIT (OUTPATIENT)
Dept: FAMILY MEDICINE | Facility: CLINIC | Age: 59
End: 2019-05-31
Payer: COMMERCIAL

## 2019-05-31 VITALS
BODY MASS INDEX: 28.63 KG/M2 | TEMPERATURE: 98 F | OXYGEN SATURATION: 98 % | HEART RATE: 67 BPM | DIASTOLIC BLOOD PRESSURE: 80 MMHG | WEIGHT: 216 LBS | SYSTOLIC BLOOD PRESSURE: 138 MMHG

## 2019-05-31 DIAGNOSIS — F41.9 ANXIETY AND DEPRESSION: Primary | ICD-10-CM

## 2019-05-31 DIAGNOSIS — Z85.46 PERSONAL HISTORY OF PROSTATE CANCER: ICD-10-CM

## 2019-05-31 DIAGNOSIS — M54.50 ACUTE BILATERAL LOW BACK PAIN WITHOUT SCIATICA: ICD-10-CM

## 2019-05-31 DIAGNOSIS — F32.A ANXIETY AND DEPRESSION: Primary | ICD-10-CM

## 2019-05-31 LAB
ALBUMIN UR-MCNC: NEGATIVE MG/DL
APPEARANCE UR: CLEAR
BILIRUB UR QL STRIP: NEGATIVE
COLOR UR AUTO: YELLOW
GLUCOSE UR STRIP-MCNC: NEGATIVE MG/DL
HGB UR QL STRIP: NEGATIVE
KETONES UR STRIP-MCNC: NEGATIVE MG/DL
LEUKOCYTE ESTERASE UR QL STRIP: NEGATIVE
NITRATE UR QL: NEGATIVE
PH UR STRIP: 5.5 PH (ref 5–7)
PSA SERPL-ACNC: 0.16 UG/L (ref 0–4)
SOURCE: NORMAL
SP GR UR STRIP: 1.01 (ref 1–1.03)
UROBILINOGEN UR STRIP-ACNC: 0.2 EU/DL (ref 0.2–1)

## 2019-05-31 PROCEDURE — 99214 OFFICE O/P EST MOD 30 MIN: CPT | Performed by: FAMILY MEDICINE

## 2019-05-31 PROCEDURE — G0103 PSA SCREENING: HCPCS | Performed by: FAMILY MEDICINE

## 2019-05-31 PROCEDURE — 81003 URINALYSIS AUTO W/O SCOPE: CPT | Performed by: FAMILY MEDICINE

## 2019-05-31 PROCEDURE — 36415 COLL VENOUS BLD VENIPUNCTURE: CPT | Performed by: FAMILY MEDICINE

## 2019-05-31 ASSESSMENT — ANXIETY QUESTIONNAIRES
5. BEING SO RESTLESS THAT IT IS HARD TO SIT STILL: SEVERAL DAYS
GAD7 TOTAL SCORE: 9
6. BECOMING EASILY ANNOYED OR IRRITABLE: NOT AT ALL
2. NOT BEING ABLE TO STOP OR CONTROL WORRYING: MORE THAN HALF THE DAYS
3. WORRYING TOO MUCH ABOUT DIFFERENT THINGS: MORE THAN HALF THE DAYS
IF YOU CHECKED OFF ANY PROBLEMS ON THIS QUESTIONNAIRE, HOW DIFFICULT HAVE THESE PROBLEMS MADE IT FOR YOU TO DO YOUR WORK, TAKE CARE OF THINGS AT HOME, OR GET ALONG WITH OTHER PEOPLE: NOT DIFFICULT AT ALL
7. FEELING AFRAID AS IF SOMETHING AWFUL MIGHT HAPPEN: SEVERAL DAYS
1. FEELING NERVOUS, ANXIOUS, OR ON EDGE: MORE THAN HALF THE DAYS

## 2019-05-31 ASSESSMENT — PATIENT HEALTH QUESTIONNAIRE - PHQ9
5. POOR APPETITE OR OVEREATING: SEVERAL DAYS
SUM OF ALL RESPONSES TO PHQ QUESTIONS 1-9: 6

## 2019-05-31 NOTE — LETTER
[address] [place] [address]    June 5, 2019    Adriel Elam  [address]  [place] [address]          Dear Adriel,    Enclosed is a copy of your results. Normal results.     Results for orders placed or performed in visit on 05/31/19   PSA, screen   Result Value Ref Range    PSA 0.16 0 - 4 ug/L   UA reflex to Microscopic and Culture   Result Value Ref Range    Color Urine Yellow     Appearance Urine Clear     Glucose Urine Negative NEG^Negative mg/dL    Bilirubin Urine Negative NEG^Negative    Ketones Urine Negative NEG^Negative mg/dL    Specific Gravity Urine 1.010 1.003 - 1.035    Blood Urine Negative NEG^Negative    pH Urine 5.5 5.0 - 7.0 pH    Protein Albumin Urine Negative NEG^Negative mg/dL    Urobilinogen Urine 0.2 0.2 - 1.0 EU/dL    Nitrite Urine Negative NEG^Negative    Leukocyte Esterase Urine Negative NEG^Negative    Source Midstream Urine    If you have any questions or concerns, please me or my clinic team at [phone].    Sincerely,      Luis E Gill MD/bt

## 2019-05-31 NOTE — PROGRESS NOTES
Subjective     Adriel Elam is a 59 year old male who presents to clinic today for the following health issues:    HPI   Depression and Anxiety Follow-Up     Not been taking Buspar for 3 months   Was working though not lasting as longer   The nervousness is getting back.   Sertaline tended to work well   Stopped medications as did not want to rely on medication long term.      How are you doing with your depression since your last visit? No change    How are you doing with your anxiety since your last visit?  No change    Are you having other symptoms that might be associated with depression or anxiety? No    Have you had a significant life event? No     Do you have any concerns with your use of alcohol or other drugs? No    Social History     Tobacco Use     Smoking status: Never Smoker     Smokeless tobacco: Never Used   Substance Use Topics     Alcohol use: Yes     Alcohol/week: 0.0 oz     Comment: Rare      Drug use: No     PHQ 7/17/2017 7/9/2018 8/13/2018   PHQ-9 Total Score 3 6 11   Q9: Thoughts of better off dead/self-harm past 2 weeks Not at all Not at all Not at all     KIMANI-7 SCORE 7/17/2017 7/9/2018 8/13/2018   Total Score 7 7 10       Suicide Assessment Five-step Evaluation and Treatment (SAFE-T)    History of Prostate cancer:    Had prostatectomy over 1.5 yrs ago.    Wants recheck for PSA    Back Pain    Had some sore back for a couple of week.    Started doing stomach exercises and probably aggravated the back  Also concerned about UTI    ADP: Not done yet but says it is either him or his father.       Amount of exercise or physical activity: 6-7 days/week for an average of greater than 60 minutes    Problems taking medications regularly: No    Medication side effects: none    Diet: regular (no restrictions)  Past Medical History:   Diagnosis Date     Hypothyroidism        Exam:  /80   Pulse 67   Temp 98  F (36.7  C) (Oral)   Wt 98 kg (216 lb)   SpO2 98%   BMI 28.63 kg/m      Constitutional:  healthy, alert and no distress  Cardiovascular: negative  Respiratory: Percussion normal. Good diaphragmatic excursion. Lungs clear  Gastrointestinal: Abdomen soft, non-tender. BS normal. No masses, organomegaly  : Deferred  Musculoskeletal: extremities normal- no gross deformities noted, gait normal and normal muscle tone  Skin: no suspicious lesions or rashes  Psychiatric: mentation appears normal, affect flat, anxious and judgment and insight intact    Results for orders placed or performed in visit on 05/31/19   PSA, screen   Result Value Ref Range    PSA 0.16 0 - 4 ug/L   UA reflex to Microscopic and Culture   Result Value Ref Range    Color Urine Yellow     Appearance Urine Clear     Glucose Urine Negative NEG^Negative mg/dL    Bilirubin Urine Negative NEG^Negative    Ketones Urine Negative NEG^Negative mg/dL    Specific Gravity Urine 1.010 1.003 - 1.035    Blood Urine Negative NEG^Negative    pH Urine 5.5 5.0 - 7.0 pH    Protein Albumin Urine Negative NEG^Negative mg/dL    Urobilinogen Urine 0.2 0.2 - 1.0 EU/dL    Nitrite Urine Negative NEG^Negative    Leukocyte Esterase Urine Negative NEG^Negative    Source Midstream Urine      ASSESSMENT/PLAN:    (F41.9,  F32.9) Anxiety and depression  (primary encounter diagnosis)  Comment: Discussed switching to alternative; will do zoloft  Plan: sertraline (ZOLOFT) 50 MG tablet    (Z85.46) Personal history of prostate cancer  Comment: PSA normal.  Plan: PSA, screen, UA reflex to Microscopic and         Culture    (M54.5) Acute bilateral low back pain without sciatica  Comment: UA normal. Likely muscle strain  Plan: UA reflex to Microscopic and         Culture    Follow up in 1 month or sooner with concerns    Luis E Gill

## 2019-06-01 ASSESSMENT — ANXIETY QUESTIONNAIRES: GAD7 TOTAL SCORE: 9

## 2019-09-17 DIAGNOSIS — E06.3 HYPOTHYROIDISM DUE TO HASHIMOTO'S THYROIDITIS: ICD-10-CM

## 2019-09-18 NOTE — TELEPHONE ENCOUNTER
Reason for call:  Medication   If this is a refill request, has the caller requested the refill from the pharmacy already? Yes  Will the patient be using a [facility]? No  Name of the pharmacy and phone number for the current request: [facility] [phone]    Name of the medication requested: levothyroxine (    Other request: Pharmacy told patient to call to request refill    Phone number to reach patient:  Home number on file [phone] (home)    Best Time:  any    Can we leave a detailed message on this number?  YES

## 2019-09-20 DIAGNOSIS — E06.3 HYPOTHYROIDISM DUE TO HASHIMOTO'S THYROIDITIS: ICD-10-CM

## 2019-09-20 LAB — TSH SERPL DL<=0.005 MIU/L-ACNC: 2.49 MU/L (ref 0.4–4)

## 2019-09-20 PROCEDURE — 36415 COLL VENOUS BLD VENIPUNCTURE: CPT | Performed by: INTERNAL MEDICINE

## 2019-09-20 PROCEDURE — 84443 ASSAY THYROID STIM HORMONE: CPT | Performed by: INTERNAL MEDICINE

## 2019-09-20 RX ORDER — LEVOTHYROXINE SODIUM 88 UG/1
TABLET ORAL
Qty: 30 TABLET | Refills: 0 | Status: SHIPPED | OUTPATIENT
Start: 2019-09-20 | End: 2019-10-22

## 2019-09-20 NOTE — TELEPHONE ENCOUNTER
"Routing refill request to provider for review/approval because:  Labs not current:  TSH    Requested Prescriptions   Pending Prescriptions Disp Refills     levothyroxine (SYNTHROID/LEVOTHROID) 88 MCG tablet [Pharmacy Med Name: LEVOTHYROXIN 88MCG  TAB] 90 tablet 0     Sig: TAKE 1 TABLET BY MOUTH ONCE DAILY       Thyroid Protocol Failed - 9/18/2019  9:21 AM        Failed - Normal TSH on file in past 12 months     Recent Labs   Lab Test 07/09/18  0925   TSH 1.79              Passed - Patient is 12 years or older        Passed - Recent (12 mo) or future (30 days) visit within the authorizing provider's specialty     Patient had office visit in the last 12 months or has a visit in the next 30 days with authorizing provider or within the authorizing provider's specialty.  See \"Patient Info\" tab in inbasket, or \"Choose Columns\" in Meds & Orders section of the refill encounter.              Passed - Medication is active on med list        Heidi Castillo RN  "

## 2019-09-20 NOTE — TELEPHONE ENCOUNTER
Lab order placed. Rx sent for 30 days.   Please call and schedule lab only appointment for further refills.     Heidi Castillo RN

## 2019-09-20 NOTE — LETTER
[address] [place] [address]    September 23, 2019    Adriel Elam  [address]  [facility] [address]          Dear Adriel,    Normal thyroid.     Enclosed is a copy of your results.     Results for orders placed or performed in visit on 09/20/19   **TSH with free T4 reflex FUTURE anytime   Result Value Ref Range    TSH 2.49 0.40 - 4.00 mU/L       If you have any questions or concerns, please call myself or my nurse at [phone].      Sincerely,        Laureen Topete MD /acevedo

## 2019-10-21 ENCOUNTER — ALLIED HEALTH/NURSE VISIT (OUTPATIENT)
Dept: NURSING | Facility: CLINIC | Age: 59
End: 2019-10-21
Payer: COMMERCIAL

## 2019-10-21 DIAGNOSIS — E06.3 HYPOTHYROIDISM DUE TO HASHIMOTO'S THYROIDITIS: ICD-10-CM

## 2019-10-21 DIAGNOSIS — Z23 NEED FOR PROPHYLACTIC VACCINATION AND INOCULATION AGAINST INFLUENZA: Primary | ICD-10-CM

## 2019-10-21 PROCEDURE — 90682 RIV4 VACC RECOMBINANT DNA IM: CPT

## 2019-10-21 PROCEDURE — 99207 ZZC NO CHARGE NURSE ONLY: CPT

## 2019-10-21 PROCEDURE — 90471 IMMUNIZATION ADMIN: CPT

## 2019-10-21 NOTE — TELEPHONE ENCOUNTER
Reason for Call:  Other call back    Detailed comments: patients states he is almost out of his levothyroxine and needs a refill asap, because he only has 3 left    Phone Number Patient can be reached at: Home number on file [phone] (home)    Best Time: anytime    Can we leave a detailed message on this number? YES    Call taken on 10/21/2019 at 3:16 PM by Melanie Allison

## 2019-10-22 DIAGNOSIS — E06.3 HYPOTHYROIDISM DUE TO HASHIMOTO'S THYROIDITIS: ICD-10-CM

## 2019-10-22 RX ORDER — LEVOTHYROXINE SODIUM 88 UG/1
88 TABLET ORAL DAILY
Qty: 90 TABLET | Refills: 1 | Status: CANCELLED | OUTPATIENT
Start: 2019-10-22

## 2019-10-22 RX ORDER — LEVOTHYROXINE SODIUM 88 UG/1
88 TABLET ORAL DAILY
Qty: 90 TABLET | Refills: 1 | Status: SHIPPED | OUTPATIENT
Start: 2019-10-22 | End: 2020-04-30

## 2019-10-22 NOTE — TELEPHONE ENCOUNTER
Reason for call:  Medication      If this is a refill request, has the caller requested the refill from the pharmacy already? Yes     Will the patient be using a [facility]? No     Name of the pharmacy and phone number for the current request:[facility] [phone]    Name of the medication requested: levothyroxine (SYNTHROID/LEVOTHROID) 88 MCG tablet    Other request: Please call patient when sent, he wants a 90 supply    Phone number to reach patient:  Home number on file [phone] (home)    Best Time:  any    Can we leave a detailed message on this number?  YES

## 2019-10-22 NOTE — TELEPHONE ENCOUNTER
Please contact pharmacy to verify script for levothyroxine was received, then notify pt. Thanks.    Haydee Pemberton RN  [facility]

## 2019-10-22 NOTE — TELEPHONE ENCOUNTER
duplicate    Disp Refills Start End MARIO    levothyroxine (SYNTHROID/LEVOTHROID) 88 MCG tablet 90 tablet 1 10/22/2019  No   Sig - Route: Take 1 tablet (88 mcg) by mouth daily - Oral   Sent to pharmacy as: levothyroxine (SYNTHROID/LEVOTHROID) 88 MCG tablet   Class: E-Prescribe   Order: [id number]   E-Prescribing Status: Receipt confirmed by pharmacy (10/22/2019  1:43 PM CDT)

## 2019-10-24 NOTE — TELEPHONE ENCOUNTER
Spoke to pharmacy and they said patient picked up medication  Marcus Roberts CMA on 10/24/2019 at 9:19 AM

## 2019-10-25 RX ORDER — LEVOTHYROXINE SODIUM 88 UG/1
TABLET ORAL
Qty: 30 TABLET | Refills: 0 | OUTPATIENT
Start: 2019-10-25

## 2020-04-30 DIAGNOSIS — E06.3 HYPOTHYROIDISM DUE TO HASHIMOTO'S THYROIDITIS: ICD-10-CM

## 2020-04-30 RX ORDER — LEVOTHYROXINE SODIUM 88 UG/1
TABLET ORAL
Qty: 90 TABLET | Refills: 0 | Status: SHIPPED | OUTPATIENT
Start: 2020-04-30 | End: 2020-08-14

## 2020-08-14 ENCOUNTER — TELEPHONE (OUTPATIENT)
Dept: FAMILY MEDICINE | Facility: CLINIC | Age: 60
End: 2020-08-14

## 2020-08-14 DIAGNOSIS — E06.3 HYPOTHYROIDISM DUE TO HASHIMOTO'S THYROIDITIS: ICD-10-CM

## 2020-08-14 RX ORDER — LEVOTHYROXINE SODIUM 88 UG/1
TABLET ORAL
Qty: 90 TABLET | Refills: 0 | Status: SHIPPED | OUTPATIENT
Start: 2020-08-14 | End: 2020-11-25

## 2020-08-14 NOTE — TELEPHONE ENCOUNTER
Called and spoke with patient. States he needs a refill on levothyroxine and is requesting to restart buspar. Rx is not currently active. Patient states he was previously taking this but went off because his anxiety was under control but now it's getting worse and is wanting to restart it. Not currently taking the sertraline.   Scheduled telephone visit for 8/19/2020 to discuss further.   Rx for levothyroxine sent in.     Heidi Castillo RN

## 2020-08-14 NOTE — TELEPHONE ENCOUNTER
Reason for call:  Medication   If this is a refill request, has the caller requested the refill from the pharmacy already? Yes  Will the patient be using a [facility]? No  Name of the pharmacy and phone number for the current request:   [address] [phone] (Phone)  [phone] (Fax)         Name of the medication requested:   levothyroxine (SYNTHROID/LEVOTHROID) 88 MCG tablet  And another medication (did not see on medication list)    Other request: Please call to discuss other medication.    Phone number to reach patient:  Home number on file [phone] (home)    Best Time:  any    Can we leave a detailed message on this number?  YES    Travel screening: Negative

## 2020-08-18 NOTE — PROGRESS NOTES
"Adriel Elam is a 60 year old male who is being evaluated via a billable telephone visit.      The patient has been notified of following:     \"This telephone visit will be conducted via a call between you and your physician/provider. We have found that certain health care needs can be provided without the need for a physical exam.  This service lets us provide the care you need with a short phone conversation.  If a prescription is necessary we can send it directly to your pharmacy.  If lab work is needed we can place an order for that and you can then stop by our lab to have the test done at a later time.    Telephone visits are billed at different rates depending on your insurance coverage. During this emergency period, for some insurers they may be billed the same as an in-person visit.  Please reach out to your insurance provider with any questions.    If during the course of the call the physician/provider feels a telephone visit is not appropriate, you will not be charged for this service.\"    Patient has given verbal consent for Telephone visit?  Yes    What phone number would you like to be contacted at? [phone]     How would you like to obtain your AVS? Mail a copy    Subjective     Adriel Elam is a 60 year old male who presents via phone visit today for the following health issues:    HPI    Abnormal Mood Symptoms  Onset: 2 WEEKS    Description:   Depression: no  Anxiety: YES    Accompanying Signs & Symptoms:  Still participating in activities that you used to enjoy: no  Fatigue: YES  Irritability: no  Difficulty concentrating: YES  Changes in appetite: no  Problems with sleep: no  Heart racing/beating fast : YES  Thoughts of hurting yourself or others: none    History:   Recent stress: MOM PASSED  Prior depression hospitalization: None  Family history of depression: YES  Family history of anxiety: no    Precipitating factors:   Alcohol/drug use: no    Alleviating factors:  MORE MONEY, NOT HAVING " TO WORK    Therapies Tried and outcome: Buspar (Buspirone)      Patient is requesting to restart buspar.  Patient states he was previously taking this but went off because his anxiety was under control but now it's getting worse and is wanting to restart it. Not currently taking the sertraline.    Thrombpcytopenia:     11/17/2017    Platelet Count  150 - 450 10e9/L  140Low          Assessment/Plan:    Assessment & Plan     Adriel was seen today for anxiety.    Diagnoses and all orders for this visit:    Major depressive disorder with single episode, remission status unspecified  -     busPIRone (BUSPAR) 5 MG tablet; Start at 5 mg twice daily for 3 days, then 7.5 mg (1.5 tabs) twice daily for 3 days, then 10 mg (2 tabs) twice daily for 3 days, then 12.5 mg (2.5 tabs) twice daily for 3 days, then 15 mg (3 tabs) twice daily and stay at that dose    Acquired hypothyroidism      -   Refill sent to the pharmacy.    Thrombocytopenia (H)      Mild     -    CBC in 1 month    Prostate cancer (H)    -  Prostatectomy on 11/25/2017 at [facility]    -  PSA surveillance    CKD (chronic kidney disease) stage 3, GFR 30-59 ml/min (H)      -   stable    Return in about 1 month (around 9/19/2020) for Follow up for symptoms recheck.    Luis E Gill MD  [facility]    Phone call duration: 14 minutes

## 2020-08-19 ENCOUNTER — VIRTUAL VISIT (OUTPATIENT)
Dept: FAMILY MEDICINE | Facility: CLINIC | Age: 60
End: 2020-08-19
Payer: COMMERCIAL

## 2020-08-19 DIAGNOSIS — F32.9 MAJOR DEPRESSIVE DISORDER WITH SINGLE EPISODE, REMISSION STATUS UNSPECIFIED: Primary | ICD-10-CM

## 2020-08-19 DIAGNOSIS — N18.30 CKD (CHRONIC KIDNEY DISEASE) STAGE 3, GFR 30-59 ML/MIN (H): ICD-10-CM

## 2020-08-19 DIAGNOSIS — D69.6 THROMBOCYTOPENIA (H): ICD-10-CM

## 2020-08-19 DIAGNOSIS — E03.9 ACQUIRED HYPOTHYROIDISM: ICD-10-CM

## 2020-08-19 DIAGNOSIS — C61 PROSTATE CANCER (H): ICD-10-CM

## 2020-08-19 PROCEDURE — 99214 OFFICE O/P EST MOD 30 MIN: CPT | Mod: 95 | Performed by: FAMILY MEDICINE

## 2020-08-19 RX ORDER — BUSPIRONE HYDROCHLORIDE 5 MG/1
TABLET ORAL
Qty: 180 TABLET | Refills: 0 | Status: SHIPPED | OUTPATIENT
Start: 2020-08-19 | End: 2020-11-26

## 2020-10-08 ENCOUNTER — TELEPHONE (OUTPATIENT)
Dept: FAMILY MEDICINE | Facility: CLINIC | Age: 60
End: 2020-10-08

## 2020-10-08 NOTE — TELEPHONE ENCOUNTER
Last PSA was done on 5/31/19, patient wondering when he is due for recheck? Patient does have history of prostate cancer.   Britni Sow RN

## 2020-10-08 NOTE — TELEPHONE ENCOUNTER
Reason for call:  Other   Patient called regarding (reason for call): call back  Additional comments:  Is he due for his psa test? Please call and advise.     Phone number to reach patient:  Home number on file [phone] (home)    Best Time:   Any     Can we leave a detailed message on this number?  YES    Travel screening: Not Applicable

## 2020-10-08 NOTE — TELEPHONE ENCOUNTER
Called patient. No answer and unable to leave VM. Please help schedule preventative appointment if patient returns call. PSA can be checked at appointment.   Haydee RANGEL CMA ([facility])

## 2020-10-19 RX ORDER — INFLUENZA A VIRUS A/NEBRASKA/14/2019 (H1N1) ANTIGEN (MDCK CELL DERIVED, PROPIOLACTONE INACTIVATED), INFLUENZA A VIRUS A/DELAWARE/39/2019 (H3N2) ANTIGEN (MDCK CELL DERIVED, PROPIOLACTONE INACTIVATED), INFLUENZA B VIRUS B/SINGAPORE/INFTT-16-0610/2016 ANTIGEN (MDCK CELL DERIVED, PROPIOLACTONE INACTIVATED), INFLUENZA B VIRUS B/DARWIN/7/2019 ANTIGEN (MDCK CELL DERIVED, PROPIOLACTONE INACTIVATED) 15; 15; 15; 15 UG/.5ML; UG/.5ML; UG/.5ML; UG/.5ML
INJECTION, SUSPENSION INTRAMUSCULAR
COMMUNITY
Start: 2020-09-02 | End: 2022-04-04

## 2020-10-20 ENCOUNTER — OFFICE VISIT (OUTPATIENT)
Dept: FAMILY MEDICINE | Facility: CLINIC | Age: 60
End: 2020-10-20
Payer: COMMERCIAL

## 2020-10-20 VITALS
HEART RATE: 65 BPM | BODY MASS INDEX: 28.76 KG/M2 | OXYGEN SATURATION: 98 % | DIASTOLIC BLOOD PRESSURE: 78 MMHG | WEIGHT: 217 LBS | SYSTOLIC BLOOD PRESSURE: 132 MMHG | TEMPERATURE: 98.4 F

## 2020-10-20 DIAGNOSIS — Z80.42 FAMILY HISTORY OF PROSTATE CANCER: ICD-10-CM

## 2020-10-20 DIAGNOSIS — Z00.00 ROUTINE GENERAL MEDICAL EXAMINATION AT A HEALTH CARE FACILITY: Primary | ICD-10-CM

## 2020-10-20 DIAGNOSIS — E03.9 ACQUIRED HYPOTHYROIDISM: ICD-10-CM

## 2020-10-20 PROCEDURE — 99396 PREV VISIT EST AGE 40-64: CPT | Performed by: FAMILY MEDICINE

## 2020-10-20 PROCEDURE — 84443 ASSAY THYROID STIM HORMONE: CPT | Performed by: FAMILY MEDICINE

## 2020-10-20 PROCEDURE — 80061 LIPID PANEL: CPT | Performed by: FAMILY MEDICINE

## 2020-10-20 PROCEDURE — 36415 COLL VENOUS BLD VENIPUNCTURE: CPT | Performed by: FAMILY MEDICINE

## 2020-10-20 PROCEDURE — 84439 ASSAY OF FREE THYROXINE: CPT | Performed by: FAMILY MEDICINE

## 2020-10-20 PROCEDURE — 80048 BASIC METABOLIC PNL TOTAL CA: CPT | Performed by: FAMILY MEDICINE

## 2020-10-20 PROCEDURE — 84153 ASSAY OF PSA TOTAL: CPT | Performed by: FAMILY MEDICINE

## 2020-10-20 NOTE — LETTER
October 22, 2020      Adriel Elam  [address]  [place] [address]        Dear ,    We are writing to inform you of your test results.  essentially normal result; TSH marginally elevated but T4 is normal, no action needed at this time.  Resulted Orders   TSH WITH FREE T4 REFLEX   Result Value Ref Range    TSH 4.87 (H) 0.40 - 4.00 mU/L   Lipid Profile (Chol, Trig, HDL, LDL calc)   Result Value Ref Range    Cholesterol 131 <200 mg/dL    Triglycerides 109 <150 mg/dL    HDL Cholesterol 46 >39 mg/dL    LDL Cholesterol Calculated 63 <100 mg/dL      Comment:      Desirable:       <100 mg/dl    Non HDL Cholesterol 85 <130 mg/dL   Basic metabolic panel   Result Value Ref Range    Sodium 140 133 - 144 mmol/L    Potassium 4.2 3.4 - 5.3 mmol/L    Chloride 111 (H) 94 - 109 mmol/L    Carbon Dioxide 24 20 - 32 mmol/L    Anion Gap 5 3 - 14 mmol/L    Glucose 89 70 - 99 mg/dL    Urea Nitrogen 21 7 - 30 mg/dL    Creatinine 1.01 0.66 - 1.25 mg/dL    GFR Estimate 79 >60 mL/min/[1.73_m2]      Comment:      Non  GFR Calc  Starting 12/18/2018, serum creatinine based estimated GFR (eGFR) will be   calculated using the Chronic Kidney Disease Epidemiology Collaboration   (CKD-EPI) equation.      GFR Estimate If Black >90 >60 mL/min/[1.73_m2]      Comment:       GFR Calc  Starting 12/18/2018, serum creatinine based estimated GFR (eGFR) will be   calculated using the Chronic Kidney Disease Epidemiology Collaboration   (CKD-EPI) equation.      Calcium 8.7 8.5 - 10.1 mg/dL   PSA, tumor marker   Result Value Ref Range    PSA 0.32 0 - 4 ug/L      Comment:      Assay Method:  Chemiluminescence using Siemens Vista analyzer   T4 free   Result Value Ref Range    T4 Free 1.14 0.76 - 1.46 ng/dL       If you have any questions or concerns, please call the clinic at the number listed above.       Sincerely,        Luis E Gill MD/lisandro

## 2020-10-20 NOTE — PROGRESS NOTES
3  SUBJECTIVE:   CC: Adriel Elam is an 60 year old male who presents for preventive health visit.       Patient has been advised of split billing requirements and indicates understanding: Yes  Healthy Habits:    Do you get at least three servings of calcium containing foods daily (dairy, green leafy vegetables, etc.)? yes    Amount of exercise or daily activities, outside of work: 2 day(s) per week    Problems taking medications regularly No    Medication side effects: No    Have you had an eye exam in the past two years? yes    Do you see a dentist twice per year? yes    Do you have sleep apnea, excessive snoring or daytime drowsiness?no    -------------------------------------    Today's PHQ-2 Score:   PHQ-2 ( 1999 [employer]) 8/13/2018 7/9/2018   Q1: Little interest or pleasure in doing things 2 1   Q2: Feeling down, depressed or hopeless 2 1   PHQ-2 Score 4 2       Abuse: Current or Past(Physical, Sexual or Emotional)- NO  Do you feel safe in your environment? YES    Have you ever done Advance Care Planning? (For example, a Health Directive, POLST, or a discussion with a medical provider or your loved ones about your wishes): No, advance care planning information given to patient to review.  Patient plans to discuss their wishes with loved ones or provider.      Social History     Tobacco Use     Smoking status: Never Smoker     Smokeless tobacco: Never Used   Substance Use Topics     Alcohol use: Yes     Alcohol/week: 0.0 standard drinks     Comment: Rare      If you drink alcohol do you typically have >3 drinks per day or >7 drinks per week? No                      Last PSA:   PSA   Date Value Ref Range Status   10/20/2020 0.32 0 - 4 ug/L Final     Comment:     Assay Method:  Chemiluminescence using Siemens Vista analyzer       Reviewed orders with patient. Reviewed health maintenance and updated orders accordingly - Yes  Patient Active Problem List   Diagnosis     Hypothyroidism     Prostate cancer (H)      Major depressive disorder with single episode, remission status unspecified     Thrombocytopenia (H)     CKD (chronic kidney disease) stage 3, GFR 30-59 ml/min     No past surgical history on file.    Social History     Tobacco Use     Smoking status: Never Smoker     Smokeless tobacco: Never Used   Substance Use Topics     Alcohol use: Yes     Alcohol/week: 0.0 standard drinks     Comment: Rare      Family History   Problem Relation Age of Onset     Cancer No family hx of      Diabetes No family hx of      Hypertension No family hx of      Cerebrovascular Disease No family hx of      Macular Degeneration No family hx of      Glaucoma No family hx of      Thyroid Disease No family hx of          Reviewed and updated as needed this visit by clinical staff  Tobacco  Allergies  Meds            Reviewed and updated as needed this visit by Provider                  ROS:  CONSTITUTIONAL: NEGATIVE for fever, chills, change in weight  INTEGUMENTARY/SKIN: NEGATIVE for worrisome rashes, moles or lesions  EYES: NEGATIVE for vision changes or irritation  ENT: NEGATIVE for ear, mouth and throat problems  RESP: NEGATIVE for significant cough or SOB  CV: NEGATIVE for chest pain, palpitations or peripheral edema  GI: NEGATIVE for nausea, abdominal pain, heartburn, or change in bowel habits   male: positive for erectile dysfunction  MUSCULOSKELETAL: NEGATIVE for significant arthralgias or myalgia  NEURO: NEGATIVE for weakness, dizziness or paresthesias  PSYCHIATRIC: NEGATIVE for changes in mood or affect    OBJECTIVE:   /78   Pulse 65   Temp 98.4  F (36.9  C) (Oral)   Wt 98.4 kg (217 lb)   SpO2 98%   BMI 28.76 kg/m    EXAM:  GENERAL: healthy, alert and no distress  EYES: Eyes grossly normal to inspection, PERRL and conjunctivae and sclerae normal  HENT: ear canals and TM's normal, nose and mouth without ulcers or lesions  NECK: no adenopathy, no asymmetry, masses, or scars and thyroid normal to palpation  RESP: lungs  "clear to auscultation - no rales, rhonchi or wheezes  CV: regular rate and rhythm, normal S1 S2, no S3 or S4, no murmur, click or rub, no peripheral edema and peripheral pulses strong  ABDOMEN: soft, nontender, no hepatosplenomegaly, no masses and bowel sounds normal  MS: no gross musculoskeletal defects noted, no edema  SKIN: no suspicious lesions or rashes  NEURO: Normal strength and tone, mentation intact and speech normal  PSYCH: mentation appears normal, affect normal/bright    Diagnostic Test Results:  Labs reviewed in Epic    ASSESSMENT/PLAN:   Adriel was seen today for physical.    Diagnoses and all orders for this visit:    Routine general medical examination at a health care facility  -     Lipid Profile (Chol, Trig, HDL, LDL calc)  -     Basic metabolic panel  -     T4 free    Acquired hypothyroidism  -     TSH WITH FREE T4 REFLEX  -     Basic metabolic panel    Family history of prostate cancer  -     PSA, tumor marker      Patient has been advised of split billing requirements and indicates understanding: Yes  COUNSELING:  Reviewed preventive health counseling, as reflected in patient instructions       Regular exercise       Healthy diet/nutrition       Estimated body mass index is 28.76 kg/m  as calculated from the following:    Height as of 8/13/18: 1.85 m (6' 0.83\").    Weight as of this encounter: 98.4 kg (217 lb).    Weight management plan: Discussed healthy diet and exercise guidelines    He reports that he has never smoked. He has never used smokeless tobacco.      Counseling Resources:  ATP IV Guidelines  Pooled Cohorts Equation Calculator  FRAX Risk Assessment  ICSI Preventive Guidelines  Dietary Guidelines for Americans, 2010  USDA's MyPlate  ASA Prophylaxis  Lung CA Screening    Luis E Gill MD  [facility]  "

## 2020-10-21 LAB
ANION GAP SERPL CALCULATED.3IONS-SCNC: 5 MMOL/L (ref 3–14)
BUN SERPL-MCNC: 21 MG/DL (ref 7–30)
CALCIUM SERPL-MCNC: 8.7 MG/DL (ref 8.5–10.1)
CHLORIDE SERPL-SCNC: 111 MMOL/L (ref 94–109)
CHOLEST SERPL-MCNC: 131 MG/DL
CO2 SERPL-SCNC: 24 MMOL/L (ref 20–32)
CREAT SERPL-MCNC: 1.01 MG/DL (ref 0.66–1.25)
GFR SERPL CREATININE-BSD FRML MDRD: 79 ML/MIN/{1.73_M2}
GLUCOSE SERPL-MCNC: 89 MG/DL (ref 70–99)
HDLC SERPL-MCNC: 46 MG/DL
LDLC SERPL CALC-MCNC: 63 MG/DL
NONHDLC SERPL-MCNC: 85 MG/DL
POTASSIUM SERPL-SCNC: 4.2 MMOL/L (ref 3.4–5.3)
PSA SERPL-MCNC: 0.32 UG/L (ref 0–4)
SODIUM SERPL-SCNC: 140 MMOL/L (ref 133–144)
T4 FREE SERPL-MCNC: 1.14 NG/DL (ref 0.76–1.46)
TRIGL SERPL-MCNC: 109 MG/DL
TSH SERPL DL<=0.005 MIU/L-ACNC: 4.87 MU/L (ref 0.4–4)

## 2020-11-23 ENCOUNTER — TELEPHONE (OUTPATIENT)
Dept: FAMILY MEDICINE | Facility: CLINIC | Age: 60
End: 2020-11-23

## 2020-11-23 NOTE — TELEPHONE ENCOUNTER
Reason for Call: Request for an order or referral:    Order or referral being requested: COVID TEST ORDER    Date needed: as soon as possible    Has the patient been seen by the PCP for this problem? NO    Additional comments: PT wants to be tested for COVID19- Asymptomatic and has no known exposure.    Phone number Patient can be reached at:  Cell number on file:    Telephone Information:   Mobile [phone]       Best Time:  ANYTIME    Can we leave a detailed message on this number?  YES    Call taken on 11/23/2020 at 5:28 PM by Enrique Seaman

## 2020-11-24 NOTE — PROGRESS NOTES
"Adriel Elam is a 60 year old male who is being evaluated via a billable telephone visit.      The patient has been notified of following:     \"This telephone visit will be conducted via a call between you and your physician/provider. We have found that certain health care needs can be provided without the need for a physical exam.  This service lets us provide the care you need with a short phone conversation.  If a prescription is necessary we can send it directly to your pharmacy.  If lab work is needed we can place an order for that and you can then stop by our lab to have the test done at a later time.    Telephone visits are billed at different rates depending on your insurance coverage. During this emergency period, for some insurers they may be billed the same as an in-person visit.  Please reach out to your insurance provider with any questions.    If during the course of the call the physician/provider feels a telephone visit is not appropriate, you will not be charged for this service.\"    Patient has given verbal consent for Telephone visit?  Yes    What phone number would you like to be contacted at? [phone]     How would you like to obtain your AVS? Mail a copy    Subjective     Adriel Elam is a 60 year old male who presents via phone visit today for the following health issues:    HPI     Chief Complaint   Patient presents with     Covid 19 Testing     Request-pt lives with 92 year old father. Wants to be sure he doesn't have Covid.     Covid Test:  Patient lives with his 92-year-old father and just want to be checked for Covid.  He has not been in contact with anybody with Covid or suspected of having Covid.  He does not have any symptoms.    Hypothyroidism:  It is a refill for his medication      Assessment & Plan     Adriel was seen today for covid 19 testing.    Diagnoses and all orders for this visit:    Encounter for testing for COVID-19 virus       -     Discussed current recommendations; " against testing if asymptomatic and not exposed or posing risk   Hypothyroidism due to Hashimoto's thyroiditis  -     levothyroxine (SYNTHROID/LEVOTHROID) 88 MCG tablet; Take 1 tablet (88 mcg) by mouth daily      Return in about 3 months (around 2/25/2021) for Routine Visit.    Luis E Gill MD  [facility]    Phone call duration: 10 minutes

## 2020-11-24 NOTE — TELEPHONE ENCOUNTER
Called and spoke with Adriel. Patient is scheduled for a phone visit with Luis E Gill MD on 11/25/2020 at 9:00 am. Alicia Dsouza,

## 2020-11-25 ENCOUNTER — VIRTUAL VISIT (OUTPATIENT)
Dept: FAMILY MEDICINE | Facility: CLINIC | Age: 60
End: 2020-11-25
Payer: COMMERCIAL

## 2020-11-25 DIAGNOSIS — F32.9 MAJOR DEPRESSIVE DISORDER WITH SINGLE EPISODE, REMISSION STATUS UNSPECIFIED: ICD-10-CM

## 2020-11-25 DIAGNOSIS — Z20.822 ENCOUNTER FOR LABORATORY TESTING FOR COVID-19 VIRUS: Primary | ICD-10-CM

## 2020-11-25 DIAGNOSIS — E06.3 HYPOTHYROIDISM DUE TO HASHIMOTO'S THYROIDITIS: ICD-10-CM

## 2020-11-25 PROCEDURE — 99213 OFFICE O/P EST LOW 20 MIN: CPT | Mod: 95 | Performed by: FAMILY MEDICINE

## 2020-11-25 RX ORDER — LEVOTHYROXINE SODIUM 88 UG/1
88 TABLET ORAL DAILY
Qty: 90 TABLET | Refills: 1 | Status: SHIPPED | OUTPATIENT
Start: 2020-11-25 | End: 2021-06-25

## 2020-11-26 RX ORDER — BUSPIRONE HYDROCHLORIDE 5 MG/1
TABLET ORAL
Qty: 180 TABLET | Refills: 0 | Status: SHIPPED | OUTPATIENT
Start: 2020-11-26 | End: 2023-07-20

## 2020-11-26 NOTE — TELEPHONE ENCOUNTER
Prescription approved per [facility] Refill Protocol.  Tyesha Kirk RN

## 2021-02-11 ENCOUNTER — NURSE TRIAGE (OUTPATIENT)
Dept: FAMILY MEDICINE | Facility: CLINIC | Age: 61
End: 2021-02-11

## 2021-02-11 NOTE — TELEPHONE ENCOUNTER
Reason for Call:  Other foot pain    Detailed comments: Has fungal infection on the big toe, right foot, painful when stands on it and if his shoes and socks are off it feels fine.    Phone Number Patient can be reached at: Cell number on file:    Telephone Information:   Mobile [phone]       Best Time: Today    Can we leave a detailed message on this number? Not Applicable    Call taken on 2/11/2021 at 2:13 PM by Margaret Horn

## 2021-02-12 NOTE — TELEPHONE ENCOUNTER
Spoke with pt. Has a little bit of pain. Starts from knee down. Foot on right side has been sore for 1 month. Has tried anti fungal ointment on it and seems to be working. Has changed his shoes and socks.  Other toes are sore as well. Ankles are swelling up during the day. Is on his feet a lot for work. Elevates his feet and this helps with the swelling. Did notice that he had more redness in his foot last night. Recommended an appt since he is having foot and knee pain and ankle swelling. Appt scheduled.    Haydee Pemberton RN  [facility]    Additional Information    Negative: Followed an ankle or foot injury    Negative: Ankle pain is the main symptom    Negative: Entire foot is cool or blue in comparison to other foot    Negative: Purple or black skin on foot or toe    Negative: Red area or streak and fever    Negative: Swollen foot and fever    Negative: Patient sounds very sick or weak to the triager    Negative: SEVERE pain (e.g., excruciating, unable to do any normal activities)    Negative: Looks like a boil, infected sore, deep ulcer, or other infected rash (spreading redness, pus)    Negative: Swollen foot (EXCEPTIONs: localized bump from bunions, calluses, insect bite, sting)    Negative: Numbness in one foot (i.e., loss of sensation)    MILD pain (e.g., does not interfere with normal activities) and present > 7 days    Negative: MODERATE pain (e.g., interferes with normal activities, limping) and present > 3 days    Negative: Numbness or tingling in feet and new or increased    Negative: Pain in the big toe joint    Negative: Patient wants to be seen    Protocols used: FOOT PAIN-A-[place]

## 2021-02-15 ENCOUNTER — OFFICE VISIT (OUTPATIENT)
Dept: FAMILY MEDICINE | Facility: CLINIC | Age: 61
End: 2021-02-15
Payer: COMMERCIAL

## 2021-02-15 VITALS
BODY MASS INDEX: 29.96 KG/M2 | TEMPERATURE: 98.5 F | WEIGHT: 226 LBS | SYSTOLIC BLOOD PRESSURE: 156 MMHG | DIASTOLIC BLOOD PRESSURE: 91 MMHG | OXYGEN SATURATION: 100 % | HEART RATE: 58 BPM

## 2021-02-15 DIAGNOSIS — L20.9 ATOPIC DERMATITIS, UNSPECIFIED TYPE: ICD-10-CM

## 2021-02-15 DIAGNOSIS — B35.1 ONYCHOMYCOSIS OF RIGHT GREAT TOE: ICD-10-CM

## 2021-02-15 PROCEDURE — 99213 OFFICE O/P EST LOW 20 MIN: CPT | Performed by: PHYSICIAN ASSISTANT

## 2021-02-15 RX ORDER — TRIAMCINOLONE ACETONIDE 5 MG/G
CREAM TOPICAL
Qty: 30 G | Refills: 1 | Status: SHIPPED | OUTPATIENT
Start: 2021-02-15 | End: 2021-12-17 | Stop reason: ALTCHOICE

## 2021-02-15 RX ORDER — CICLOPIROX 80 MG/ML
SOLUTION TOPICAL DAILY
Qty: 6 ML | Refills: 0 | Status: SHIPPED | OUTPATIENT
Start: 2021-02-15

## 2021-02-15 ASSESSMENT — PATIENT HEALTH QUESTIONNAIRE - PHQ9: SUM OF ALL RESPONSES TO PHQ QUESTIONS 1-9: 5

## 2021-02-15 NOTE — PROGRESS NOTES
"    {PROVIDER CHARTING PREFERENCE:250554}    Subjective   Adriel is a 60 year old who presents for the following health issues {ACCOMPANIED BY STATEMENT (Optional):934728}    HPI       Musculoskeletal problem/pain  Onset/Duration: ***  Description  Location: {.:932559} - {.:072359}  Joint Swelling: {.:112086::\"no\"}  Redness: {.:625863::\"no\"}  Pain: {.:591985::\"no\"}  Warmth: {.:571935::\"no\"}  Intensity:  {mild,moderate,severe:795486}  Progression of Symptoms:  {.:617088}  Accompanying signs and symptoms:   Fevers: {.:502288::\"no\"}  Numbness/tingling/weakness: {.:898067::\"no\"}  History  Trauma to the area: {.:702429::\"no\"}  Recent illness:  {.:022995::\"no\"}  Previous similar problem: {.:567521::\"no\"}  Previous evaluation:  {.:878734::\"no\"}  Precipitating or alleviating factors:  Aggravating factors include: {AGGRAVATING MS FACTORS CHRONIC PROB:956195::\"none\"}  Therapies tried and outcome: {MS RELIEF ITEMS:263745::\"nothing\"}    {additonal problems for provider to add (Optional):996572}    Review of Systems   {ROS COMP (Optional):187698}      Objective    There were no vitals taken for this visit.  There is no height or weight on file to calculate BMI.  Physical Exam   {Exam List (Optional):716425}    {Diagnostic Test Results (Optional):516274}    {AMBULATORY ATTESTATION (Optional):664173}        "

## 2021-02-15 NOTE — PROGRESS NOTES
Assessment & Plan     Onychomycosis of right great toe  Patient has used ciclopirox in the past and would like to use this again. Refilled.   - ciclopirox (PENLAC) 8 % external solution; Apply topically daily Apply to adjacent skin and affected nails daily.  Remove with alcohol every 7 days, then repeat.    Atopic dermatitis, unspecified type  Patient has tried this in the past. Also recommended using lubriderm or cerave lotion when skin is dry.  - triamcinolone (ARISTOCORT HP) 0.5 % external cream; Apply sparingly to affected area three times daily.                 No follow-ups on file.    MELISA Braswell [facility] ARNOLDO Morel is a 60 year old who presents for the following health issues     HPI       Concern - Toe infection  Onset: 1 month ago  Description: Right big toe  Intensity: Getting worse  Progression of Symptoms:  worsening  Accompanying Signs & Symptoms: Toes have sensitivity, foot pain  Previous history of similar problem: Yes  Precipitating factors:        Worsened by: Walking  Alleviating factors:        Improved by: None  Therapies tried and outcome: Cream, clipping nail- temporary relief    Patient has been using OTC fungal cream. He also has trimmed his toenail, cutting off what he said was a fungal infection. Has been improving since then.     Rash on side of left leg that has been ongoing for years, intermittent. Seems worse recently. Had a cream in the past, but would like a refill.         Review of Systems   Constitutional, HEENT, cardiovascular, pulmonary, gi and gu systems are negative, except as otherwise noted.      Objective    BP (!) 156/91 (BP Location: Right arm, Patient Position: Chair, Cuff Size: Adult Regular)   Pulse 58   Temp 98.5  F (36.9  C) (Oral)   Wt 102.5 kg (226 lb)   SpO2 100%   BMI 29.96 kg/m    Body mass index is 29.96 kg/m .  Physical Exam   GENERAL: healthy, alert and no distress  NECK: no adenopathy, no asymmetry,  masses, or scars and thyroid normal to palpation  RESP: lungs clear to auscultation - no rales, rhonchi or wheezes  CV: regular rate and rhythm, normal S1 S2, no S3 or S4, no murmur, click or rub, no peripheral edema and peripheral pulses strong  SKIN: Right great toe: toenail trimmed back to about 2 cm in length, tenderness to bare nail bed. No open lesions.  Left upper thigh: scattered 5 mm excoriated lesions, no active bleeding.  MS: no gross usculoskeletal defects noted, no edema

## 2021-08-05 ENCOUNTER — LAB (OUTPATIENT)
Dept: LAB | Facility: CLINIC | Age: 61
End: 2021-08-05
Payer: COMMERCIAL

## 2021-08-05 ENCOUNTER — DOCUMENTATION ONLY (OUTPATIENT)
Dept: LAB | Facility: CLINIC | Age: 61
End: 2021-08-05

## 2021-08-05 DIAGNOSIS — E03.9 ACQUIRED HYPOTHYROIDISM: ICD-10-CM

## 2021-08-05 DIAGNOSIS — I10 ESSENTIAL HYPERTENSION: ICD-10-CM

## 2021-08-05 DIAGNOSIS — Z12.5 SCREENING FOR PROSTATE CANCER: ICD-10-CM

## 2021-08-05 DIAGNOSIS — E03.9 ACQUIRED HYPOTHYROIDISM: Primary | ICD-10-CM

## 2021-08-05 PROCEDURE — 84443 ASSAY THYROID STIM HORMONE: CPT

## 2021-08-05 PROCEDURE — 36415 COLL VENOUS BLD VENIPUNCTURE: CPT

## 2021-08-05 PROCEDURE — 80048 BASIC METABOLIC PNL TOTAL CA: CPT

## 2021-08-05 PROCEDURE — G0103 PSA SCREENING: HCPCS

## 2021-08-05 PROCEDURE — 84439 ASSAY OF FREE THYROXINE: CPT

## 2021-08-06 NOTE — PROGRESS NOTES
PLEASE PLACE OR SIGN PENDED ORDERS. PT HAD LAB APPOINTMENT ON 8/5/21 AND LAB SHARON GREEN GEL TUBE.  THANK YOU.    TAYLA   [facility].

## 2021-08-09 LAB
ANION GAP SERPL CALCULATED.3IONS-SCNC: 5 MMOL/L (ref 3–14)
BUN SERPL-MCNC: 21 MG/DL (ref 7–30)
CALCIUM SERPL-MCNC: 8.6 MG/DL (ref 8.5–10.1)
CHLORIDE BLD-SCNC: 109 MMOL/L (ref 94–109)
CO2 SERPL-SCNC: 26 MMOL/L (ref 20–32)
CREAT SERPL-MCNC: 0.99 MG/DL (ref 0.66–1.25)
GFR SERPL CREATININE-BSD FRML MDRD: 81 ML/MIN/1.73M2
GLUCOSE BLD-MCNC: 85 MG/DL (ref 70–99)
POTASSIUM BLD-SCNC: 4.1 MMOL/L (ref 3.4–5.3)
PSA SERPL-MCNC: 0.35 UG/L (ref 0–4)
SODIUM SERPL-SCNC: 140 MMOL/L (ref 133–144)
T4 FREE SERPL-MCNC: 1.07 NG/DL (ref 0.76–1.46)
TSH SERPL DL<=0.005 MIU/L-ACNC: 4.28 MU/L (ref 0.4–4)

## 2021-08-16 ENCOUNTER — OFFICE VISIT (OUTPATIENT)
Dept: FAMILY MEDICINE | Facility: CLINIC | Age: 61
End: 2021-08-16
Payer: COMMERCIAL

## 2021-08-16 VITALS
SYSTOLIC BLOOD PRESSURE: 128 MMHG | RESPIRATION RATE: 19 BRPM | BODY MASS INDEX: 28.94 KG/M2 | HEART RATE: 65 BPM | OXYGEN SATURATION: 97 % | TEMPERATURE: 98 F | DIASTOLIC BLOOD PRESSURE: 80 MMHG | WEIGHT: 218.4 LBS | HEIGHT: 73 IN

## 2021-08-16 DIAGNOSIS — F32.9 MAJOR DEPRESSIVE DISORDER WITH SINGLE EPISODE, REMISSION STATUS UNSPECIFIED: ICD-10-CM

## 2021-08-16 DIAGNOSIS — N18.31 STAGE 3A CHRONIC KIDNEY DISEASE (H): ICD-10-CM

## 2021-08-16 DIAGNOSIS — M79.89 LEFT LEG SWELLING: Primary | ICD-10-CM

## 2021-08-16 DIAGNOSIS — C61 PROSTATE CANCER (H): ICD-10-CM

## 2021-08-16 DIAGNOSIS — D69.6 THROMBOCYTOPENIA (H): ICD-10-CM

## 2021-08-16 LAB
BASOPHILS # BLD AUTO: 0 10E3/UL (ref 0–0.2)
BASOPHILS NFR BLD AUTO: 1 %
EOSINOPHIL # BLD AUTO: 0.1 10E3/UL (ref 0–0.7)
EOSINOPHIL NFR BLD AUTO: 3 %
ERYTHROCYTE [DISTWIDTH] IN BLOOD BY AUTOMATED COUNT: 13 % (ref 10–15)
HCT VFR BLD AUTO: 44.2 % (ref 40–53)
HGB BLD-MCNC: 15.6 G/DL (ref 13.3–17.7)
LYMPHOCYTES # BLD AUTO: 1.1 10E3/UL (ref 0.8–5.3)
LYMPHOCYTES NFR BLD AUTO: 23 %
MCH RBC QN AUTO: 30.8 PG (ref 26.5–33)
MCHC RBC AUTO-ENTMCNC: 35.3 G/DL (ref 31.5–36.5)
MCV RBC AUTO: 87 FL (ref 78–100)
MONOCYTES # BLD AUTO: 0.3 10E3/UL (ref 0–1.3)
MONOCYTES NFR BLD AUTO: 7 %
NEUTROPHILS # BLD AUTO: 3.3 10E3/UL (ref 1.6–8.3)
NEUTROPHILS NFR BLD AUTO: 67 %
PLATELET # BLD AUTO: 139 10E3/UL (ref 150–450)
RBC # BLD AUTO: 5.06 10E6/UL (ref 4.4–5.9)
WBC # BLD AUTO: 4.9 10E3/UL (ref 4–11)

## 2021-08-16 PROCEDURE — 83880 ASSAY OF NATRIURETIC PEPTIDE: CPT | Performed by: FAMILY MEDICINE

## 2021-08-16 PROCEDURE — 85379 FIBRIN DEGRADATION QUANT: CPT | Performed by: FAMILY MEDICINE

## 2021-08-16 PROCEDURE — 99213 OFFICE O/P EST LOW 20 MIN: CPT | Performed by: FAMILY MEDICINE

## 2021-08-16 PROCEDURE — 36415 COLL VENOUS BLD VENIPUNCTURE: CPT | Performed by: FAMILY MEDICINE

## 2021-08-16 PROCEDURE — 85025 COMPLETE CBC W/AUTO DIFF WBC: CPT | Performed by: FAMILY MEDICINE

## 2021-08-16 ASSESSMENT — MIFFLIN-ST. JEOR: SCORE: 1846.92

## 2021-08-16 ASSESSMENT — PAIN SCALES - GENERAL: PAINLEVEL: NO PAIN (0)

## 2021-08-16 NOTE — PROGRESS NOTES
"    Assessment & Plan     Left leg swelling: Rt > Lt   Differentials: postural, medication, chf, anemia. Will do baseline labs. He will elevated legs, can use compression stocking; if persisting will consider a water pill  - BNP-N terminal pro; Future  - D dimer, quantitative; Future  - CBC with platelets and differential; Future  - CBC with platelets and differential  - BNP-N terminal pro  - D dimer, quantitative    Major depressive disorder with single episode, remission status unspecified    Well controlled on medication; continue current treatment plan.    Thrombocytopenia (H)    -   Borderline and at baseline.    Prostate cancer (H)    -   On surveillance    Stage 3a chronic kidney disease     -  stable    BMI 28.0-28.9,adult56}     BMI:   Estimated body mass index is 28.45 kg/m  as calculated from the following:    Height as of this encounter: 1.866 m (6' 1.47\").    Weight as of this encounter: 99.1 kg (218 lb 6.4 oz).   Weight management plan: Discussed healthy diet and exercise guidelines      Luis E Gill MD  [facility] ARNOLDO Morel is a 61 year old who presents for the following health issues   HPI   Chief Complaint   Patient presents with     Ankle Swelling     Patient is here today for swelling in the right ankle that has been on and off for a few months.      Health Maintenance     Zsoter, PHQ-9, Eye Exam, Covid10 Vaccine      Swelling of the legs on and off.  No pain or redness.  Denies any cough or SOB    Review of Systems   Constitutional, HEENT, cardiovascular, pulmonary, gi and gu systems are negative, except as otherwise noted.      Objective    /80   Pulse 65   Temp 98  F (36.7  C) (Oral)   Resp 19   Ht 1.866 m (6' 1.47\")   Wt 99.1 kg (218 lb 6.4 oz)   SpO2 97%   BMI 28.45 kg/m    Body mass index is 28.45 kg/m .  Physical Exam   GENERAL: healthy, alert and no distress  RESP: lungs clear to auscultation - no rales, rhonchi or wheezes  CV: regular " rate and rhythm, no murmur, click or rub, no peripheral edema   MS: peripheral edema ankles

## 2021-08-17 LAB
D DIMER PPP FEU-MCNC: <0.27 UG/ML FEU (ref 0–0.5)
NT-PROBNP SERPL-MCNC: 121 PG/ML (ref 0–125)

## 2021-08-17 ASSESSMENT — PATIENT HEALTH QUESTIONNAIRE - PHQ9: SUM OF ALL RESPONSES TO PHQ QUESTIONS 1-9: 11

## 2021-08-25 ENCOUNTER — TELEPHONE (OUTPATIENT)
Dept: FAMILY MEDICINE | Facility: CLINIC | Age: 61
End: 2021-08-25

## 2021-08-25 NOTE — TELEPHONE ENCOUNTER
Received call from patient. He states that he was in on 08/16 for an appointment with Dr. Gill and had his blood drawn. He now has a circular area at the side where blood was taken it looks dark like dried blood and there is also a yellowish color surrounding it on the skin. Pt is concerned and wants to make sure that the site is not infected. He declines any swelling, pain, tenderness, warmth, drainage or redness to the site. Discussed with patient that this sounds most consistent with a healing bruise or superficial thrombophlebitis but most likely a bruise since pt declines any lump to the site. Notified him that it should continue to heal, the discoloration is a sign that it is healing and go away eventually. He could try a warm compress if he would like, but this probably will not do much at this point. Pt verbalized understanding. He will call if the site does not seem to be improving or if new symptoms develop.

## 2021-10-04 ENCOUNTER — TELEPHONE (OUTPATIENT)
Dept: FAMILY MEDICINE | Facility: CLINIC | Age: 61
End: 2021-10-04

## 2021-10-04 DIAGNOSIS — R60.0 EDEMA OF BOTH LEGS: Primary | ICD-10-CM

## 2021-10-04 NOTE — TELEPHONE ENCOUNTER
Was seen for this issue on 8/16/2021 by Luis E Monahan.  BNP, D-dimer were normal.  CBC was normal with a low platelet that was at baseline.    Patient reports that the colby ankle/feet pitting edema have persisted.  Is the same but happening daily.  Gets better with elevation at night but will swell up again within 2 hours of being up  Right > left  Denies redness or warmth but is slightly sore   Advised patient to cut back on sodium/salt which he admits he should do as he does not have time to cook his own meals and eats a lot of processed foods that is high in sodium.    Patient wants to know what could be causing this and what he can do    Ok to leave a detailed message on either phones listed.  Call mobile number first.   May call home number but his elderly father may .  Ok to speak to patient's father    Lnea Murcia RN  [facility]

## 2021-10-04 NOTE — TELEPHONE ENCOUNTER
Patient states his feet are still swelling and wonders why.  States he saw you about this.  Please call.    Thank you.

## 2021-10-07 DIAGNOSIS — E06.3 HYPOTHYROIDISM DUE TO HASHIMOTO'S THYROIDITIS: ICD-10-CM

## 2021-10-07 NOTE — TELEPHONE ENCOUNTER
Patient calling stating he has not heard anything.  Informed patient PCP is out of clinic.    In office visit states possible water pill.  Differentials: postural, medication, chf, anemia. Will do baseline labs. He will elevated legs, can use compression stocking; if persisting will consider a water pill    Please advise    Genna Edwards

## 2021-10-08 RX ORDER — LEVOTHYROXINE SODIUM 88 UG/1
88 TABLET ORAL DAILY
Qty: 90 TABLET | Refills: 0 | Status: SHIPPED | OUTPATIENT
Start: 2021-10-08 | End: 2022-02-01

## 2021-10-08 NOTE — TELEPHONE ENCOUNTER
The leg swelling is most likely postural, due to being on feet a lot.  The other option will to try compression stockings.

## 2021-10-08 NOTE — TELEPHONE ENCOUNTER
"Routing refill request to provider for review/approval because:  Labs out of range:  TSH    Requested Prescriptions   Pending Prescriptions Disp Refills     levothyroxine (SYNTHROID/LEVOTHROID) 88 MCG tablet 90 tablet 0     Sig: Take 1 tablet (88 mcg) by mouth daily       Thyroid Protocol Failed - 10/7/2021  7:15 AM        Failed - Normal TSH on file in past 12 months     Recent Labs   Lab Test 08/05/21  1800   TSH 4.28*              Passed - Patient is 12 years or older        Passed - Recent (12 mo) or future (30 days) visit within the authorizing provider's specialty     Patient has had an office visit with the authorizing provider or a provider within the authorizing providers department within the previous 12 mos or has a future within next 30 days. See \"Patient Info\" tab in inbasket, or \"Choose Columns\" in Meds & Orders section of the refill encounter.              Passed - Medication is active on med list           Nova Alvarado RN on 10/8/2021 at 4:14 PM    "

## 2021-10-12 NOTE — TELEPHONE ENCOUNTER
Team,  Please print order for DME under other orders from encounter date 10/4/21 and notify pt. Thanks!    Haydee Pemberton RN  [facility]

## 2021-10-12 NOTE — TELEPHONE ENCOUNTER
Called and spoke with patient. He states his feet are no longer swollen and he does not need the DME order at this time. He asks that we keep it on file in his chart encase he needs it at a later date.    Harini Mayes MA

## 2021-12-17 ENCOUNTER — VIRTUAL VISIT (OUTPATIENT)
Dept: FAMILY MEDICINE | Facility: CLINIC | Age: 61
End: 2021-12-17
Payer: COMMERCIAL

## 2021-12-17 DIAGNOSIS — L40.9 PSORIASIS: Primary | ICD-10-CM

## 2021-12-17 DIAGNOSIS — R21 RASH: ICD-10-CM

## 2021-12-17 PROCEDURE — 99213 OFFICE O/P EST LOW 20 MIN: CPT | Mod: 95 | Performed by: FAMILY MEDICINE

## 2021-12-17 RX ORDER — BETAMETHASONE DIPROPIONATE 0.5 MG/G
CREAM TOPICAL 2 TIMES DAILY
Qty: 50 G | Refills: 0 | Status: SHIPPED | OUTPATIENT
Start: 2021-12-17

## 2021-12-17 NOTE — PROGRESS NOTES
Adriel is a 61 year old who is being evaluated via a billable telephone visit.    What phone number would you like to be contacted at? [phone]  How would you like to obtain your AVS? Mail a copy    Assessment & Plan     Psoriasis  Rash consistent with psoriasis.  We will try Diprolene cream.  - augmented betamethasone dipropionate (DIPROLENE-AF) 0.05 % external cream; Apply topically 2 times daily (to affected sites)    Rash    - augmented betamethasone dipropionate (DIPROLENE-AF) 0.05 % external cream; Apply topically 2 times daily (to affected sites)    Return in about 1 month (around 1/17/2022) for Follow up for symptoms recheck.    Luis E Gill MD  [facility]    Subjective   Adriel is a 61 year old who presents for the following health issues   HPI   Chief Complaint   Patient presents with     Discuss Labs     discuss rash on hip, swelling on ankle has gone down mostly just feels tight.      Rash:  Rash on the hip: it is itchy; improves itch and in the sacral area.    Foot swelling: going away only tightness jarrod when kneeling down.    Labs:   up to date.    Review of Systems     Objective           Vitals:  No vitals were obtained today due to virtual visit.    Physical Exam         Phone call duration: 12 minutes

## 2022-02-01 DIAGNOSIS — E06.3 HYPOTHYROIDISM DUE TO HASHIMOTO'S THYROIDITIS: ICD-10-CM

## 2022-02-01 RX ORDER — LEVOTHYROXINE SODIUM 88 UG/1
88 TABLET ORAL DAILY
Qty: 90 TABLET | Refills: 0 | Status: SHIPPED | OUTPATIENT
Start: 2022-02-01 | End: 2022-05-20

## 2022-02-01 NOTE — TELEPHONE ENCOUNTER
Routing refill request to provider for review/approval because:  Labs out of range:    TSH   Date Value Ref Range Status   08/05/2021 4.28 (H) 0.40 - 4.00 mU/L Final   10/20/2020 4.87 (H) 0.40 - 4.00 mU/L Final

## 2022-02-01 NOTE — TELEPHONE ENCOUNTER
Reason for call:  Medication   If this is a refill request, has the caller requested the refill from the pharmacy already? No  Will the patient be using a [facility]? No  Name of the pharmacy and phone number for the current request:    [facility] [address] [place] [address]    Name of the medication requested: levothyroxine (SYNTHROID/LEVOTHROID) 88 MCG tablet    Other request: patient called in, he has just a few left    Phone number to reach patient:  Cell number on file:    Telephone Information:   Mobile [phone]       Best Time:      Can we leave a detailed message on this number?  YES    Travel screening: Not Applicable

## 2022-04-04 ENCOUNTER — OFFICE VISIT (OUTPATIENT)
Dept: FAMILY MEDICINE | Facility: CLINIC | Age: 62
End: 2022-04-04
Payer: COMMERCIAL

## 2022-04-04 VITALS
WEIGHT: 218.4 LBS | HEART RATE: 58 BPM | BODY MASS INDEX: 28.45 KG/M2 | DIASTOLIC BLOOD PRESSURE: 90 MMHG | OXYGEN SATURATION: 98 % | SYSTOLIC BLOOD PRESSURE: 138 MMHG | TEMPERATURE: 98.8 F

## 2022-04-04 DIAGNOSIS — M25.561 ARTHRALGIA OF RIGHT KNEE: ICD-10-CM

## 2022-04-04 DIAGNOSIS — D69.6 THROMBOCYTOPENIA (H): ICD-10-CM

## 2022-04-04 DIAGNOSIS — M79.89 RIGHT LEG SWELLING: Primary | ICD-10-CM

## 2022-04-04 DIAGNOSIS — R03.0 ELEVATED BP WITHOUT DIAGNOSIS OF HYPERTENSION: ICD-10-CM

## 2022-04-04 DIAGNOSIS — Z13.220 SCREENING FOR HYPERLIPIDEMIA: ICD-10-CM

## 2022-04-04 DIAGNOSIS — L81.9 DISCOLORATION OF SKIN OF FOOT: ICD-10-CM

## 2022-04-04 DIAGNOSIS — Z85.46 HISTORY OF PROSTATE CANCER: ICD-10-CM

## 2022-04-04 PROBLEM — N18.30 CKD (CHRONIC KIDNEY DISEASE) STAGE 3, GFR 30-59 ML/MIN (H): Status: RESOLVED | Noted: 2020-08-19 | Resolved: 2022-04-04

## 2022-04-04 PROBLEM — C61 PROSTATE CANCER (H): Status: RESOLVED | Noted: 2017-10-06 | Resolved: 2022-04-04

## 2022-04-04 PROCEDURE — 99213 OFFICE O/P EST LOW 20 MIN: CPT | Performed by: FAMILY MEDICINE

## 2022-04-04 RX ORDER — CHLORTHALIDONE 25 MG/1
25 TABLET ORAL DAILY
Qty: 30 TABLET | Refills: 1 | Status: SHIPPED | OUTPATIENT
Start: 2022-04-04 | End: 2022-06-01

## 2022-04-04 ASSESSMENT — PATIENT HEALTH QUESTIONNAIRE - PHQ9: SUM OF ALL RESPONSES TO PHQ QUESTIONS 1-9: 6

## 2022-04-04 NOTE — PROGRESS NOTES
"  Assessment & Plan     Right leg swelling   Has had some issues with right knee. Swelling in leg consistent with fluid retention, possibly due to poor fluid drainage in Rt leg; no sign of venous obstruction or infection.   - chlorthalidone (HYGROTON) 25 MG tablet; Take 1 tablet (25 mg) by mouth daily    Arthralgia of right knee    -  Been an on going issue and has learnt to live with it. Tylenol as needed    Discoloration of skin of foot Rt    -  Discoloration blanches; blood pooling. Not warm. Discussed this as benign, will try relieve swelling with diuretic and see if will help    Elevated BP without diagnosis of hypertension   Will try diuretic.  - chlorthalidone (HYGROTON) 25 MG tablet; Take 1 tablet (25 mg) by mouth daily    Thrombocytopenia (H)   -  stable    History of prostate cancer    -  radical prostatectomy- Adenocarcinoma; 11/2017  6}    BMI:   Estimated body mass index is 28.45 kg/m  as calculated from the following:    Height as of 8/16/21: 1.866 m (6' 1.47\").    Weight as of this encounter: 99.1 kg (218 lb 6.4 oz).           Return in about 1 month (around 5/4/2022) for Follow up for symptoms recheck, Follow up in 1 month for BP recheck.    Luis E Gill MD  [facility] ARNOLDO Morel is a 62 year old who presents for the following health issues     HPI     Concern - Right leg swelling  Onset: On and off for a couple years  Description: Squeezing leg can leave indent   Intensity: moderate  Progression of Symptoms:  same  Accompanying Signs & Symptoms:   Previous history of similar problem:   Precipitating factors:        Worsened by:   Alleviating factors:        Improved by:   Therapies tried and outcome:  none     BP Readings from Last 6 Encounters:   04/04/22 (!) 152/91   08/16/21 128/80   02/15/21 (!) 156/91   10/20/20 132/78   05/31/19 138/80   08/13/18 138/80       Review of Systems   Constitutional, HEENT, cardiovascular, pulmonary, gi and gu systems are " negative, except as otherwise noted.      Objective    BP (!) 152/91   Pulse 58   Temp 98.8  F (37.1  C) (Oral)   Wt 99.1 kg (218 lb 6.4 oz)   SpO2 98%   BMI 28.45 kg/m    Body mass index is 28.45 kg/m .  Physical Exam   GENERAL: healthy, alert and no distress  RESP: lungs clear to auscultation - no rales, rhonchi or wheezes  CV: regular rate and rhythm, no murmur, click or rub, no peripheral edema   MS: mild edema Rt leg, mild hyperemia with blanching of Rt foot compared to left

## 2022-04-07 NOTE — LETTER
[facility]  [address] [place] [address]  [place] [address]  [phone]          May 15[birth year] 2018    Adriel Elam                                                                                                                     [address]  [place] [address]            Dear Adriel,      We have been trying to reach you and have been unsuccessful.  We received a refill request from your pharmacy and noticed that you are due this month for a med recheck-please call the clinic at [phone] to schedule your appointment.  Jodi Conley,         Sincerely,         Luis E Gill MD/lisandro     Problem: Falls - Risk of:  Goal: Will remain free from falls  Description: Will remain free from falls  4/7/2022 0912 by Sandee Mitchell RN  Outcome: Ongoing  4/6/2022 2130 by Shanta Roth RN  Outcome: Ongoing     Problem: Skin Integrity:  Goal: Will show no infection signs and symptoms  Description: Will show no infection signs and symptoms  4/7/2022 0912 by Sandee Mitchell RN  Outcome: Ongoing  4/6/2022 2130 by Shanta Roth RN  Outcome: Ongoing

## 2022-05-19 ENCOUNTER — TELEPHONE (OUTPATIENT)
Dept: FAMILY MEDICINE | Facility: CLINIC | Age: 62
End: 2022-05-19
Payer: COMMERCIAL

## 2022-05-19 NOTE — TELEPHONE ENCOUNTER
Reason for call:  Other   Patient called regarding (reason for call): call back  Additional comments: Patient is requesting a refill of his medication.    Phone number to reach patient:  Home number on file Ph: [phone]    Prescription refill request for Levothyroxine. He is requesting a 90 day supply prescription to be sent to [facility] in [facility].       Best Time:  Asap    Can we leave a detailed message on this number?  YES    Travel screening: Not Applicable

## 2022-05-23 DIAGNOSIS — E06.3 HYPOTHYROIDISM DUE TO HASHIMOTO'S THYROIDITIS: ICD-10-CM

## 2022-05-24 RX ORDER — LEVOTHYROXINE SODIUM 88 UG/1
88 TABLET ORAL DAILY
Qty: 90 TABLET | Refills: 1 | OUTPATIENT
Start: 2022-05-24

## 2022-05-24 NOTE — TELEPHONE ENCOUNTER
"Routing refill request to provider for review/approval because:  Labs out of range:  TSH    Requested Prescriptions   Pending Prescriptions Disp Refills    levothyroxine (SYNTHROID/LEVOTHROID) 88 MCG tablet 90 tablet 1     Sig: Take 1 tablet (88 mcg) by mouth daily        Thyroid Protocol Failed - 5/23/2022 10:53 AM        Failed - Normal TSH on file in past 12 months     Recent Labs   Lab Test 08/05/21  1800   TSH 4.28*                Passed - Patient is 12 years or older        Passed - Recent (12 mo) or future (30 days) visit within the authorizing provider's specialty     Patient has had an office visit with the authorizing provider or a provider within the authorizing providers department within the previous 12 mos or has a future within next 30 days. See \"Patient Info\" tab in inbasket, or \"Choose Columns\" in Meds & Orders section of the refill encounter.              Passed - Medication is active on med list              Caryn Garcia RN   [facility]     "

## 2022-05-25 ENCOUNTER — DOCUMENTATION ONLY (OUTPATIENT)
Dept: LAB | Facility: CLINIC | Age: 62
End: 2022-05-25
Payer: COMMERCIAL

## 2022-05-25 ENCOUNTER — LAB (OUTPATIENT)
Dept: LAB | Facility: CLINIC | Age: 62
End: 2022-05-25
Payer: COMMERCIAL

## 2022-05-25 DIAGNOSIS — I10 ESSENTIAL HYPERTENSION: ICD-10-CM

## 2022-05-25 DIAGNOSIS — E03.9 ACQUIRED HYPOTHYROIDISM: Primary | ICD-10-CM

## 2022-05-25 DIAGNOSIS — E03.9 ACQUIRED HYPOTHYROIDISM: ICD-10-CM

## 2022-05-25 LAB
ANION GAP SERPL CALCULATED.3IONS-SCNC: 5 MMOL/L (ref 3–14)
BUN SERPL-MCNC: 23 MG/DL (ref 7–30)
CALCIUM SERPL-MCNC: 9.1 MG/DL (ref 8.5–10.1)
CHLORIDE BLD-SCNC: 104 MMOL/L (ref 94–109)
CO2 SERPL-SCNC: 31 MMOL/L (ref 20–32)
CREAT SERPL-MCNC: 1.15 MG/DL (ref 0.66–1.25)
GFR SERPL CREATININE-BSD FRML MDRD: 71 ML/MIN/1.73M2
GLUCOSE BLD-MCNC: 101 MG/DL (ref 70–99)
POTASSIUM BLD-SCNC: 3.7 MMOL/L (ref 3.4–5.3)
SODIUM SERPL-SCNC: 140 MMOL/L (ref 133–144)
TSH SERPL DL<=0.005 MIU/L-ACNC: 2.67 MU/L (ref 0.4–4)

## 2022-05-25 PROCEDURE — 80048 BASIC METABOLIC PNL TOTAL CA: CPT

## 2022-05-25 PROCEDURE — 36415 COLL VENOUS BLD VENIPUNCTURE: CPT

## 2022-05-25 PROCEDURE — 84443 ASSAY THYROID STIM HORMONE: CPT

## 2022-05-31 DIAGNOSIS — R03.0 ELEVATED BP WITHOUT DIAGNOSIS OF HYPERTENSION: ICD-10-CM

## 2022-05-31 DIAGNOSIS — M79.89 RIGHT LEG SWELLING: ICD-10-CM

## 2022-06-01 RX ORDER — CHLORTHALIDONE 25 MG/1
TABLET ORAL
Qty: 77 TABLET | Refills: 0 | OUTPATIENT
Start: 2022-06-01

## 2022-08-15 DIAGNOSIS — R03.0 ELEVATED BP WITHOUT DIAGNOSIS OF HYPERTENSION: ICD-10-CM

## 2022-08-15 DIAGNOSIS — M79.89 RIGHT LEG SWELLING: ICD-10-CM

## 2022-08-17 RX ORDER — CHLORTHALIDONE 25 MG/1
TABLET ORAL
Qty: 90 TABLET | Refills: 0 | Status: SHIPPED | OUTPATIENT
Start: 2022-08-17 | End: 2022-11-14

## 2022-08-17 NOTE — TELEPHONE ENCOUNTER
Team,     Are you able to help reach out to pt and get on schedule for BP recheck since we cannot access ancillary schedule?    Thanks,  LEONELA Sood  [facility]

## 2022-08-17 NOTE — TELEPHONE ENCOUNTER
Routing refill request to provider for review/approval because:  Failed protocol.      Lilli Bhakta RN

## 2022-08-30 NOTE — TELEPHONE ENCOUNTER
Pt is not able to schedule for ancillary as he needs an appt for after 5pm. Informed pt that he can stop by our pharmacy to get his Bp recheck. He will do that in the next couple of days.    Harini Mayes MA

## 2022-08-31 ENCOUNTER — ALLIED HEALTH/NURSE VISIT (OUTPATIENT)
Dept: FAMILY MEDICINE | Facility: CLINIC | Age: 62
End: 2022-08-31
Payer: COMMERCIAL

## 2022-08-31 VITALS — DIASTOLIC BLOOD PRESSURE: 88 MMHG | SYSTOLIC BLOOD PRESSURE: 142 MMHG

## 2022-08-31 DIAGNOSIS — Z01.30 BP CHECK: Primary | ICD-10-CM

## 2022-08-31 PROCEDURE — 99207 PR NO CHARGE NURSE ONLY: CPT | Performed by: FAMILY MEDICINE

## 2022-08-31 NOTE — PROGRESS NOTES
Adriel Elam was evaluated at [facility] on August 31, 2022 at which time his blood pressure was:    BP Readings from Last 3 Encounters:   08/31/22 (!) 142/88   04/04/22 (!) 138/90   08/16/21 128/80     Pulse Readings from Last 3 Encounters:   04/04/22 58   08/16/21 65   02/15/21 58       Reviewed lifestyle modifications for blood pressure control and reduction: including making healthy food choices, managing weight, getting regular exercise, smoking cessation, reducing alcohol consumption, monitoring blood pressure regularly.     Symptoms: None    BP Goal:< 140/90 mmHg    BP Assessment:  BP at goal    Potential Reasons for BP too high: NA - Not applicable    BP Follow-Up Plan: Recheck BP in 30 days at pharmacy    Recommendation to Provider: continue current therapy, could consider additional therapy if patient is not at goal at next check.     Note completed by:  Jenelle Lopez, PharmD MS  [facility] [place]  Pharmacy Manager  August 31, 2022

## 2022-10-17 ENCOUNTER — ALLIED HEALTH/NURSE VISIT (OUTPATIENT)
Dept: FAMILY MEDICINE | Facility: CLINIC | Age: 62
End: 2022-10-17
Payer: COMMERCIAL

## 2022-10-17 VITALS — SYSTOLIC BLOOD PRESSURE: 136 MMHG | DIASTOLIC BLOOD PRESSURE: 86 MMHG

## 2022-10-17 DIAGNOSIS — Z01.30 BP CHECK: Primary | ICD-10-CM

## 2022-10-17 PROCEDURE — 99207 PR NO CHARGE NURSE ONLY: CPT | Performed by: FAMILY MEDICINE

## 2022-10-17 NOTE — PROGRESS NOTES
Adriel Elam was evaluated at [facility] on October 17, 2022 at which time his blood pressure was:    BP Readings from Last 3 Encounters:   10/17/22 136/86   08/31/22 (!) 142/88   04/04/22 (!) 138/90     Pulse Readings from Last 3 Encounters:   04/04/22 58   08/16/21 65   02/15/21 58       Reviewed lifestyle modifications for blood pressure control and reduction: including making healthy food choices, managing weight, getting regular exercise, smoking cessation, reducing alcohol consumption, monitoring blood pressure regularly.     Symptoms: None    BP Goal:< 140/90 mmHg    BP Assessment:  BP at goal    Potential Reasons for BP too high: NA - Not applicable    BP Follow-Up Plan: Recheck BP in 6 months at pharmacy    Recommendation to Provider: none    Note completed by: Mariia Alexander [facility]  [address] [place] [address]  [place] [id number]  [phone]

## 2022-11-13 DIAGNOSIS — E06.3 HYPOTHYROIDISM DUE TO HASHIMOTO'S THYROIDITIS: ICD-10-CM

## 2022-11-13 DIAGNOSIS — R03.0 ELEVATED BP WITHOUT DIAGNOSIS OF HYPERTENSION: ICD-10-CM

## 2022-11-13 DIAGNOSIS — M79.89 RIGHT LEG SWELLING: ICD-10-CM

## 2022-11-14 RX ORDER — CHLORTHALIDONE 25 MG/1
TABLET ORAL
Qty: 90 TABLET | Refills: 0 | Status: SHIPPED | OUTPATIENT
Start: 2022-11-14 | End: 2023-02-13

## 2022-11-14 RX ORDER — LEVOTHYROXINE SODIUM 88 UG/1
TABLET ORAL
Qty: 90 TABLET | Refills: 0 | Status: SHIPPED | OUTPATIENT
Start: 2022-11-14 | End: 2023-02-13

## 2022-12-01 NOTE — TELEPHONE ENCOUNTER
Patient wants to schedule RRP. 11/27 discussed as possible date. Will schedule and call patient back with details.  Haydee Loco, CMA     Arava Counseling:  Patient counseled regarding adverse effects of Arava including but not limited to nausea, vomiting, abnormalities in liver function tests. Patients may develop mouth sores, rash, diarrhea, and abnormalities in blood counts. The patient understands that monitoring is required including LFTs and blood counts.  There is a rare possibility of scarring of the liver and lung problems that can occur when taking methotrexate. Persistent nausea, loss of appetite, pale stools, dark urine, cough, and shortness of breath should be reported immediately. Patient advised to discontinue Arava treatment and consult with a physician prior to attempting conception. The patient will have to undergo a treatment to eliminate Arava from the body prior to conception.

## 2023-02-11 DIAGNOSIS — E06.3 HYPOTHYROIDISM DUE TO HASHIMOTO'S THYROIDITIS: ICD-10-CM

## 2023-02-11 DIAGNOSIS — M79.89 RIGHT LEG SWELLING: ICD-10-CM

## 2023-02-11 DIAGNOSIS — R03.0 ELEVATED BP WITHOUT DIAGNOSIS OF HYPERTENSION: ICD-10-CM

## 2023-02-13 RX ORDER — LEVOTHYROXINE SODIUM 88 UG/1
TABLET ORAL
Qty: 90 TABLET | Refills: 0 | Status: SHIPPED | OUTPATIENT
Start: 2023-02-13 | End: 2023-05-09

## 2023-02-13 RX ORDER — CHLORTHALIDONE 25 MG/1
TABLET ORAL
Qty: 90 TABLET | Refills: 0 | Status: SHIPPED | OUTPATIENT
Start: 2023-02-13 | End: 2023-05-09

## 2023-03-08 ENCOUNTER — NURSE TRIAGE (OUTPATIENT)
Dept: FAMILY MEDICINE | Facility: CLINIC | Age: 63
End: 2023-03-08
Payer: COMMERCIAL

## 2023-03-08 NOTE — TELEPHONE ENCOUNTER
Received call from patient. He states he has been experiencing R knee pain for 35-40 years. He would like evaluation for this, states PCP, Dr. Gill is aware of this ongoing issue. He reports also R ankle swelling (chronic issue, takes chlorthalidone (HYGROTON) 25 MG tablet for this). Confirmed this by looking at 'Associated Diagnoses' to that medication in med list. He was triaged, notes no s/s of DVT. He felt that it was unnecessary to triage for this issue, as it is ongoing and PCP aware. Per protocol, advised patient to be seen within 2 weeks in clinic. Scheduled with PCP next Wednesday.     Reason for Disposition    Knee pain is a chronic symptom (recurrent or ongoing AND lasting > 4 weeks)    Additional Information    Negative: Sounds like a life-threatening emergency to the triager    Negative: Followed a knee injury    Negative: Swollen knee joint and fever    Negative: Thigh or calf pain and only 1 side and present > 1 hour    Negative: Thigh or calf swelling and only 1 side    Negative: Patient sounds very sick or weak to the triager    Negative: Can't move swollen joint at all    Negative: SEVERE pain (e.g., excruciating, unable to walk)    Negative: Very swollen knee joint    Negative: Painful rash with multiple small blisters grouped together (i.e., dermatomal distribution or 'band' or 'stripe')    Negative: Looks like a boil, infected sore, deep ulcer, or other infected rash (spreading redness, pus)    Negative: MODERATE pain (e.g., symptoms interfere with work or school, limping) and present > 3 days    Negative: Swollen knee joint (no fever or redness)    Negative: Fluid-filled sack just below knee cap  (no fever or redness)    Negative: MILD knee pain persists > 7 days    Negative: Patient wants to be seen    Protocols used: KNEE PAIN-A-OH    CJ Yarbrough RN  [facility]

## 2023-03-15 ENCOUNTER — OFFICE VISIT (OUTPATIENT)
Dept: FAMILY MEDICINE | Facility: CLINIC | Age: 63
End: 2023-03-15
Payer: COMMERCIAL

## 2023-03-15 ENCOUNTER — ANCILLARY PROCEDURE (OUTPATIENT)
Dept: GENERAL RADIOLOGY | Facility: CLINIC | Age: 63
End: 2023-03-15
Attending: FAMILY MEDICINE
Payer: COMMERCIAL

## 2023-03-15 ENCOUNTER — NURSE TRIAGE (OUTPATIENT)
Dept: NURSING | Facility: CLINIC | Age: 63
End: 2023-03-15

## 2023-03-15 VITALS
HEART RATE: 55 BPM | OXYGEN SATURATION: 97 % | HEIGHT: 74 IN | SYSTOLIC BLOOD PRESSURE: 136 MMHG | DIASTOLIC BLOOD PRESSURE: 80 MMHG | BODY MASS INDEX: 28.31 KG/M2 | WEIGHT: 220.6 LBS | RESPIRATION RATE: 19 BRPM

## 2023-03-15 DIAGNOSIS — M25.561 RIGHT KNEE PAIN, UNSPECIFIED CHRONICITY: ICD-10-CM

## 2023-03-15 DIAGNOSIS — D69.6 THROMBOCYTOPENIA (H): ICD-10-CM

## 2023-03-15 DIAGNOSIS — M25.561 RIGHT KNEE PAIN, UNSPECIFIED CHRONICITY: Primary | ICD-10-CM

## 2023-03-15 PROCEDURE — 99213 OFFICE O/P EST LOW 20 MIN: CPT | Performed by: FAMILY MEDICINE

## 2023-03-15 PROCEDURE — 73565 X-RAY EXAM OF KNEES: CPT | Mod: TC | Performed by: RADIOLOGY

## 2023-03-15 RX ORDER — MELOXICAM 15 MG/1
15 TABLET ORAL DAILY
Qty: 15 TABLET | Refills: 1 | Status: SHIPPED | OUTPATIENT
Start: 2023-03-15 | End: 2024-06-10

## 2023-03-15 ASSESSMENT — PATIENT HEALTH QUESTIONNAIRE - PHQ9
10. IF YOU CHECKED OFF ANY PROBLEMS, HOW DIFFICULT HAVE THESE PROBLEMS MADE IT FOR YOU TO DO YOUR WORK, TAKE CARE OF THINGS AT HOME, OR GET ALONG WITH OTHER PEOPLE: NOT DIFFICULT AT ALL
SUM OF ALL RESPONSES TO PHQ QUESTIONS 1-9: 5
SUM OF ALL RESPONSES TO PHQ QUESTIONS 1-9: 5

## 2023-03-15 ASSESSMENT — PAIN SCALES - GENERAL: PAINLEVEL: NO PAIN (0)

## 2023-03-15 NOTE — TELEPHONE ENCOUNTER
Triage Call:    Patient calling with additional questions following office visit.  He is asking for clarification as to why he is having pooling of blood in his right foot and how chlorthalidone has been helping with decreasing swelling.  He inquired if he is able to change his diet to prevent the pooling of blood. Provided answers to patient's question, encouraging him to exercise, elevate foot for 30 minutes 3/day, drink water, and watch weight.    Lesia Geller RN  03/15/23 6:43 PM  [facility] Nurse Advisor    Reason for Disposition    Health Information question, no triage required and triager able to answer question    Additional Information    Negative: [1] Caller is not with the adult (patient) AND [2] reporting urgent symptoms    Negative: Lab result questions    Negative: Medication questions    Negative: Caller can't be reached by phone    Negative: Caller has already spoken to PCP or another triager    Negative: RN needs further essential information from caller in order to complete triage    Negative: Requesting regular office appointment    Negative: [1] Caller requesting NON-URGENT health information AND [2] PCP's office is the best resource    Protocols used: INFORMATION ONLY CALL - [facility]

## 2023-03-15 NOTE — PROGRESS NOTES
"  Assessment & Plan     Right knee pain, unspecified chronicity   On going pain, xray essentially normal, possible meniscal pathology, will try an NSAID, if persisting after 2 weeks will consider MRI  - XR Knee AP Standing Bilateral; Future  - meloxicam (MOBIC) 15 MG tablet; Take 1 tablet (15 mg) by mouth daily    Thrombocytopenia (H)     -  stable     BMI:   Estimated body mass index is 28.61 kg/m  as calculated from the following:    Height as of this encounter: 1.87 m (6' 1.62\").    Weight as of this encounter: 100.1 kg (220 lb 9.6 oz).   Weight management plan: Discussed healthy diet and exercise guidelines    Return in about 2 weeks (around 3/29/2023) for Follow up for symptoms recheck.    Luis E Gill MD  [facility] ARNOLDO Morel is a 63 year old, presenting for the following health issues:  Knee Pain     History of Present Illness       Reason for visit:  Knee pain and bloodwork and blood pressure check    He eats 2-3 servings of fruits and vegetables daily.He consumes 1 sweetened beverage(s) daily.He exercises with enough effort to increase his heart rate 9 or less minutes per day.  He exercises with enough effort to increase his heart rate 3 or less days per week.   He is taking medications regularly.    Today's PHQ-9         PHQ-9 Total Score: 5    PHQ-9 Q9 Thoughts of better off dead/self-harm past 2 weeks :   Not at all    How difficult have these problems made it for you to do your work, take care of things at home, or get along with other people: Not difficult at all     XR KNEE AP STANDING BILATERAL 3/15/2023 10:06 AM                                                                    IMPRESSION: Chondrocalcinosis medial and lateral compartment in both  knees. Mild medial compartment narrowing right knee. No evidence for  fracture on this single projection.     Review of Systems   Constitutional, HEENT, cardiovascular, pulmonary, gi and gu systems are negative, " "except as otherwise noted.      Objective    BP (!) 151/80 (BP Location: Right arm, Cuff Size: Adult Regular)   Pulse 55   Resp 19   Ht 1.87 m (6' 1.62\")   Wt 100.1 kg (220 lb 9.6 oz)   SpO2 97%   BMI 28.61 kg/m    Body mass index is 28.61 kg/m .  Physical Exam   GENERAL: healthy, alert and no distress  RESP: lungs clear to auscultation - no rales, rhonchi or wheezes  CV: regular rate and rhythm, no murmur, click or rub, no peripheral edema   MS: no gross musculoskeletal defects noted, no edema    "

## 2023-03-16 ENCOUNTER — DOCUMENTATION ONLY (OUTPATIENT)
Dept: LAB | Facility: CLINIC | Age: 63
End: 2023-03-16
Payer: COMMERCIAL

## 2023-03-16 DIAGNOSIS — E03.9 ACQUIRED HYPOTHYROIDISM: Primary | ICD-10-CM

## 2023-03-16 DIAGNOSIS — M11.269 CHONDROCALCINOSIS OF KNEE, UNSPECIFIED LATERALITY: ICD-10-CM

## 2023-03-16 DIAGNOSIS — Z12.5 SCREENING FOR PROSTATE CANCER: ICD-10-CM

## 2023-03-16 NOTE — PROGRESS NOTES
Patient has an upcoming lab appointment on 03/18/23. Please review and place future orders that are needed.    Brenda Vail on 3/16/2023 at 9:20 AM

## 2023-03-18 ENCOUNTER — LAB (OUTPATIENT)
Dept: LAB | Facility: CLINIC | Age: 63
End: 2023-03-18
Payer: COMMERCIAL

## 2023-03-18 DIAGNOSIS — E03.9 ACQUIRED HYPOTHYROIDISM: ICD-10-CM

## 2023-03-18 DIAGNOSIS — M11.269 CHONDROCALCINOSIS OF KNEE, UNSPECIFIED LATERALITY: ICD-10-CM

## 2023-03-18 DIAGNOSIS — Z12.5 SCREENING FOR PROSTATE CANCER: ICD-10-CM

## 2023-03-18 PROCEDURE — G0103 PSA SCREENING: HCPCS

## 2023-03-18 PROCEDURE — 36415 COLL VENOUS BLD VENIPUNCTURE: CPT

## 2023-03-18 PROCEDURE — 80053 COMPREHEN METABOLIC PANEL: CPT

## 2023-03-18 PROCEDURE — 84443 ASSAY THYROID STIM HORMONE: CPT

## 2023-03-20 LAB
ALBUMIN SERPL-MCNC: 3.8 G/DL (ref 3.4–5)
ALP SERPL-CCNC: 87 U/L (ref 40–150)
ALT SERPL W P-5'-P-CCNC: 26 U/L (ref 0–70)
ANION GAP SERPL CALCULATED.3IONS-SCNC: 4 MMOL/L (ref 3–14)
AST SERPL W P-5'-P-CCNC: 19 U/L (ref 0–45)
BILIRUB SERPL-MCNC: 0.8 MG/DL (ref 0.2–1.3)
BUN SERPL-MCNC: 24 MG/DL (ref 7–30)
CALCIUM SERPL-MCNC: 8.9 MG/DL (ref 8.5–10.1)
CHLORIDE BLD-SCNC: 107 MMOL/L (ref 94–109)
CO2 SERPL-SCNC: 29 MMOL/L (ref 20–32)
CREAT SERPL-MCNC: 1.03 MG/DL (ref 0.66–1.25)
GFR SERPL CREATININE-BSD FRML MDRD: 81 ML/MIN/1.73M2
GLUCOSE BLD-MCNC: 103 MG/DL (ref 70–99)
POTASSIUM BLD-SCNC: 3.9 MMOL/L (ref 3.4–5.3)
PROT SERPL-MCNC: 7.1 G/DL (ref 6.8–8.8)
PSA SERPL-MCNC: 0.46 UG/L (ref 0–4)
SODIUM SERPL-SCNC: 140 MMOL/L (ref 133–144)
TSH SERPL DL<=0.005 MIU/L-ACNC: 3.18 MU/L (ref 0.4–4)

## 2023-05-09 DIAGNOSIS — E06.3 HYPOTHYROIDISM DUE TO HASHIMOTO'S THYROIDITIS: ICD-10-CM

## 2023-05-09 DIAGNOSIS — M79.89 RIGHT LEG SWELLING: ICD-10-CM

## 2023-05-09 DIAGNOSIS — R03.0 ELEVATED BP WITHOUT DIAGNOSIS OF HYPERTENSION: ICD-10-CM

## 2023-05-09 RX ORDER — CHLORTHALIDONE 25 MG/1
25 TABLET ORAL DAILY
Qty: 90 TABLET | Refills: 0 | Status: SHIPPED | OUTPATIENT
Start: 2023-05-09 | End: 2023-08-29

## 2023-05-09 RX ORDER — LEVOTHYROXINE SODIUM 88 UG/1
TABLET ORAL
Qty: 90 TABLET | Refills: 0 | Status: SHIPPED | OUTPATIENT
Start: 2023-05-09 | End: 2023-09-06

## 2023-07-20 ENCOUNTER — OFFICE VISIT (OUTPATIENT)
Dept: URGENT CARE | Facility: URGENT CARE | Age: 63
End: 2023-07-20
Payer: COMMERCIAL

## 2023-07-20 VITALS
HEART RATE: 56 BPM | DIASTOLIC BLOOD PRESSURE: 84 MMHG | WEIGHT: 219.4 LBS | TEMPERATURE: 97.7 F | BODY MASS INDEX: 28.46 KG/M2 | OXYGEN SATURATION: 98 % | SYSTOLIC BLOOD PRESSURE: 144 MMHG

## 2023-07-20 DIAGNOSIS — R22.43 LOCALIZED SWELLING OF BOTH LOWER LEGS: Primary | ICD-10-CM

## 2023-07-20 DIAGNOSIS — M25.561 ACUTE PAIN OF RIGHT KNEE: ICD-10-CM

## 2023-07-20 LAB
ERYTHROCYTE [DISTWIDTH] IN BLOOD BY AUTOMATED COUNT: 11.8 % (ref 10–15)
HCT VFR BLD AUTO: 40.4 % (ref 40–53)
HGB BLD-MCNC: 14.6 G/DL (ref 13.3–17.7)
MCH RBC QN AUTO: 30.7 PG (ref 26.5–33)
MCHC RBC AUTO-ENTMCNC: 36.1 G/DL (ref 31.5–36.5)
MCV RBC AUTO: 85 FL (ref 78–100)
PLATELET # BLD AUTO: 121 10E3/UL (ref 150–450)
RBC # BLD AUTO: 4.76 10E6/UL (ref 4.4–5.9)
WBC # BLD AUTO: 3.9 10E3/UL (ref 4–11)

## 2023-07-20 PROCEDURE — 85027 COMPLETE CBC AUTOMATED: CPT | Performed by: PHYSICIAN ASSISTANT

## 2023-07-20 PROCEDURE — 99214 OFFICE O/P EST MOD 30 MIN: CPT | Performed by: PHYSICIAN ASSISTANT

## 2023-07-20 PROCEDURE — 84550 ASSAY OF BLOOD/URIC ACID: CPT | Performed by: PHYSICIAN ASSISTANT

## 2023-07-20 PROCEDURE — 85379 FIBRIN DEGRADATION QUANT: CPT | Performed by: PHYSICIAN ASSISTANT

## 2023-07-20 PROCEDURE — 36415 COLL VENOUS BLD VENIPUNCTURE: CPT | Performed by: PHYSICIAN ASSISTANT

## 2023-07-20 PROCEDURE — 80048 BASIC METABOLIC PNL TOTAL CA: CPT | Performed by: PHYSICIAN ASSISTANT

## 2023-07-20 RX ORDER — PREDNISONE 20 MG/1
40 TABLET ORAL DAILY
Qty: 10 TABLET | Refills: 0 | Status: SHIPPED | OUTPATIENT
Start: 2023-07-20 | End: 2023-07-25

## 2023-07-20 ASSESSMENT — ENCOUNTER SYMPTOMS
GASTROINTESTINAL NEGATIVE: 1
MYALGIAS: 1
NECK PAIN: 0
PALPITATIONS: 0
NECK STIFFNESS: 0
FATIGUE: 0
WHEEZING: 0
RHINORRHEA: 0
SHORTNESS OF BREATH: 0
CHEST TIGHTNESS: 0
BACK PAIN: 0
ARTHRALGIAS: 1
SINUS PRESSURE: 0
COLOR CHANGE: 1
COUGH: 0
CARDIOVASCULAR NEGATIVE: 1
FEVER: 0
SORE THROAT: 0
WOUND: 0
RESPIRATORY NEGATIVE: 1
CHILLS: 0
JOINT SWELLING: 0

## 2023-07-20 NOTE — PROGRESS NOTES
Subjective   Adriel is a 63 year old, presenting for the following health issues:  Urgent Care and Leg Pain (Especially the right )  HPI   Musculoskeletal problem/pain  Onset/Duration: 1 year with worsening over the past few days   Description  Location: bilateral leg swelling, R>L  Joint Swelling: Yes  Redness: no  Pain: YES, in his R knee.  No calf pain.  Warmth: no  Intensity:  mild  Progression of Symptoms:  Worsening over the RLE  Accompanying signs and symptoms:   Fevers: no  Numbness/tingling/weakness: no  History  Trauma to the area: no. No chest pain, SOB, palpitations, orthopnea, PND.  No recent surgeries, airplane ride or prolonged immobilization.     Recent illness:  no  Previous similar problem: Yes, workup with labs and xrays have been unremarkable.  Previous evaluation:  Yes  Precipitating or alleviating factors:  Aggravating factors include: standing, walking, overuse  Therapies tried and outcome: rest/inactivity, waterpill with some minimal relief    Patient Active Problem List   Diagnosis     Hypothyroidism     Major depressive disorder with single episode, remission status unspecified     Thrombocytopenia (H)     Current Outpatient Medications   Medication     Acetaminophen (TYLENOL PO)     augmented betamethasone dipropionate (DIPROLENE-AF) 0.05 % external cream     chlorthalidone (HYGROTON) 25 MG tablet     ciclopirox (PENLAC) 8 % external solution     levothyroxine (SYNTHROID/LEVOTHROID) 88 MCG tablet     meloxicam (MOBIC) 15 MG tablet     No current facility-administered medications for this visit.      No Known Allergies    Review of Systems   Constitutional: Negative for chills, fatigue and fever.   HENT: Negative for congestion, ear discharge, ear pain, hearing loss, rhinorrhea, sinus pressure and sore throat.    Respiratory: Negative.  Negative for cough, chest tightness, shortness of breath and wheezing.    Cardiovascular: Negative.  Positive for peripheral edema. Negative for chest pain  and palpitations.   Gastrointestinal: Negative.    Musculoskeletal: Positive for arthralgias and myalgias. Negative for back pain, gait problem, joint swelling, neck pain and neck stiffness.   Skin: Positive for color change. Negative for pallor, rash and wound.   All other systems reviewed and are negative.           Objective    BP (!) 144/84 (BP Location: Left arm, Patient Position: Sitting, Cuff Size: Adult Large)   Pulse 56   Temp 97.7  F (36.5  C) (Tympanic)   Wt 99.5 kg (219 lb 6.4 oz)   SpO2 98%   BMI 28.46 kg/m    Body mass index is 28.46 kg/m .  Physical Exam  Vitals and nursing note reviewed.   Constitutional:       General: He is not in acute distress.     Appearance: Normal appearance. He is normal weight. He is not ill-appearing.   Cardiovascular:      Rate and Rhythm: Normal rate and regular rhythm.      Pulses: Normal pulses.      Heart sounds: Normal heart sounds, S1 normal and S2 normal.      Comments: Negative homans sign bilaterally.  Pulmonary:      Effort: Pulmonary effort is normal.      Breath sounds: Normal breath sounds and air entry. No decreased breath sounds, wheezing, rhonchi or rales.   Musculoskeletal:      Right knee: Normal. No bony tenderness. Normal range of motion. No tenderness. Normal alignment, normal meniscus and normal patellar mobility. Normal pulse.      Instability Tests: Anterior drawer test negative. Posterior drawer test negative. Anterior Lachman test negative. Medial Suma test negative and lateral Suma test negative.      Left knee: Normal.      Right lower leg: Swelling present. No deformity, lacerations, tenderness or bony tenderness. Edema (trace edema, no pitting) present.      Left lower leg: Normal. No swelling. No edema.   Skin:     General: Skin is warm.      Capillary Refill: Capillary refill takes less than 2 seconds.      Comments: Hyperpigmentation of the bilateral lower legs.   Neurological:      Mental Status: He is alert.       No results  found for this or any previous visit (from the past 24 hour(s)).      Assessment/Plan:  Localized swelling of both lower legs:  R>L.  This is chronic with some worsening over the past few days.  Will recheck labs below and send for lower extremity US at [facility] tomorrow since they are now done for the day.  No cardiac symptoms.  Continue with his chlorthalidone.  Recommended low sodium diet, elevating legs and compression stockings. To the ER if he develops worsening pain, redness, chest pain, SOB, orthopnea, PND.  Recheck in clinic if symptoms worsen or if symptoms do not improve.  -     Referral to Acute and Diagnostic Services (Day of diagnostic / First order acute)  -     Basic metabolic panel  (Ca, Cl, CO2, Creat, Gluc, K, Na, BUN); Future  -     BNP-N terminal pro; Future  -     D dimer, quantitative; Future    Acute pain of right knee:  Previous xrays show DJD.  Will give [id number] and check labs below.  -     predniSONE (DELTASONE) 20 MG tablet; Take 2 tablets (40 mg) by mouth daily for 5 days  -     CBC with platelets; Future  -     Uric acid; Future        Jemma See MELISA Franklin

## 2023-07-20 NOTE — LETTER
July 22, 2023      Adriel Elam  [address]  [place] [address]        Dear ,    We are writing to inform you of your test results.     Your kidney function is normal.  Your electrolytes such as sodium and potassium are normal. Uric acid test for gout is negative.  Complete blood cell count shows that you are not anemic, your hemoglobin is normal.  White blood cell count is 3.9, normal is 4-11.  Your platelet count is a little low but prior values have been low. Follow up with Primary if any concerns.        Resulted Orders   CBC with platelets   Result Value Ref Range    WBC Count 3.9 (L) 4.0 - 11.0 10e3/uL    RBC Count 4.76 4.40 - 5.90 10e6/uL    Hemoglobin 14.6 13.3 - 17.7 g/dL    Hematocrit 40.4 40.0 - 53.0 %    MCV 85 78 - 100 fL    MCH 30.7 26.5 - 33.0 pg    MCHC 36.1 31.5 - 36.5 g/dL    RDW 11.8 10.0 - 15.0 %    Platelet Count 121 (L) 150 - 450 10e3/uL   Basic metabolic panel  (Ca, Cl, CO2, Creat, Gluc, K, Na, BUN)   Result Value Ref Range    Sodium 138 136 - 145 mmol/L    Potassium 3.5 3.4 - 5.3 mmol/L    Chloride 101 98 - 107 mmol/L    Carbon Dioxide (CO2) 25 22 - 29 mmol/L    Anion Gap 12 7 - 15 mmol/L    Urea Nitrogen 19.9 8.0 - 23.0 mg/dL    Creatinine 0.99 0.67 - 1.17 mg/dL    Calcium 9.1 8.8 - 10.2 mg/dL    Glucose 88 70 - 99 mg/dL    GFR Estimate 85 >60 mL/min/1.73m2   Uric acid   Result Value Ref Range    Uric Acid 5.7 3.4 - 7.0 mg/dL   D dimer, quantitative   Result Value Ref Range    D-Dimer Quantitative 0.50 0.00 - 0.50 ug/mL FEU    Narrative    This D-dimer assay is intended for use in conjunction with a clinical pretest probability assessment model to exclude pulmonary embolism (PE) and deep venous thrombosis (DVT) in outpatients suspected of PE or DVT. The cut-off value is 0.50 ug/mL FEU.       If you have any questions or concerns, please call the clinic at the number listed above.       Sincerely,      Jemma See MELISA Franklin

## 2023-07-21 ENCOUNTER — OFFICE VISIT (OUTPATIENT)
Dept: PEDIATRICS | Facility: CLINIC | Age: 63
End: 2023-07-21
Payer: COMMERCIAL

## 2023-07-21 ENCOUNTER — ANCILLARY PROCEDURE (OUTPATIENT)
Dept: ULTRASOUND IMAGING | Facility: CLINIC | Age: 63
End: 2023-07-21
Attending: FAMILY MEDICINE
Payer: COMMERCIAL

## 2023-07-21 VITALS
RESPIRATION RATE: 14 BRPM | DIASTOLIC BLOOD PRESSURE: 80 MMHG | TEMPERATURE: 98.7 F | SYSTOLIC BLOOD PRESSURE: 120 MMHG | OXYGEN SATURATION: 97 % | WEIGHT: 219 LBS | HEART RATE: 55 BPM | BODY MASS INDEX: 28.41 KG/M2

## 2023-07-21 DIAGNOSIS — I87.2 VENOUS (PERIPHERAL) INSUFFICIENCY: ICD-10-CM

## 2023-07-21 DIAGNOSIS — M79.89 SWELLING OF LOWER EXTREMITY: ICD-10-CM

## 2023-07-21 DIAGNOSIS — M79.89 SWELLING OF LOWER EXTREMITY: Primary | ICD-10-CM

## 2023-07-21 LAB
ANION GAP SERPL CALCULATED.3IONS-SCNC: 12 MMOL/L (ref 7–15)
BUN SERPL-MCNC: 19.9 MG/DL (ref 8–23)
CALCIUM SERPL-MCNC: 9.1 MG/DL (ref 8.8–10.2)
CHLORIDE SERPL-SCNC: 101 MMOL/L (ref 98–107)
CREAT SERPL-MCNC: 0.99 MG/DL (ref 0.67–1.17)
D DIMER PPP FEU-MCNC: 0.5 UG/ML FEU (ref 0–0.5)
DEPRECATED HCO3 PLAS-SCNC: 25 MMOL/L (ref 22–29)
GFR SERPL CREATININE-BSD FRML MDRD: 85 ML/MIN/1.73M2
GLUCOSE SERPL-MCNC: 88 MG/DL (ref 70–99)
POTASSIUM SERPL-SCNC: 3.5 MMOL/L (ref 3.4–5.3)
SODIUM SERPL-SCNC: 138 MMOL/L (ref 136–145)
URATE SERPL-MCNC: 5.7 MG/DL (ref 3.4–7)

## 2023-07-21 PROCEDURE — 93970 EXTREMITY STUDY: CPT | Performed by: RADIOLOGY

## 2023-07-21 PROCEDURE — 99214 OFFICE O/P EST MOD 30 MIN: CPT | Performed by: FAMILY MEDICINE

## 2023-07-21 NOTE — PROGRESS NOTES
Assessment & Plan     Swelling of lower extremity due to venous insufficiency: seen in urgent care yesterday with acute on chronic leg pain (though mostly right knee) and swelling in the calves with skin discoloration that is both chronic and acutely worsened as well. Concern for DVT given the change and we did complete lower extremity ultrasound which was normal. Discussed that I recommend compression stockings given his longstanding history of what sounds like venous insufficiency as this would be the first line of treatment and further workup or intervention may not be needed. He doesn't have any skin ulceration or concerning other findings. Discussed concerning symptoms for which to return to urgent care or the ED.   - US Lower Extremity Venous Duplex Bilateral; Future  - Compression Sleeve/Stocking Order for DME - ONLY FOR DME      I spent a total of 30 minutes on the day of the visit.   Time spent by me doing chart review, history and exam, documentation and further activities per the note           No follow-ups on file.    Idalia Galan MD  [facility]      DME (Durable Medical Equipment) Orders and Documentation  Orders Placed This Encounter   Procedures    Compression Sleeve/Stocking Order for DME - ONLY FOR DME      The patient was assessed and it was determined the patient is in need of the following listed DME Supplies/Equipment. Please complete supporting documentation below to demonstrate medical necessity.      Compression Sleeve/Stocking(s) Supplies Documentation  The patient needs compression stockings for chronic venous insufficiency causing early skin discoloration and discomfort at risk for development of ulceration.       Subjective   Adriel is a 63 year old, presenting for the following health issues:  Deep Vein Thrombosis (R/O DVT) and Pain (Right knee )         No data to display              HPI     Musculoskeletal problem/pain  Onset/Duration: ~1  week  Description:       Location: Discomfort in righ knee and discoloration of lower legs and swelling. Has had this before but it's worse than usual and more discomfort.       Joint swelling: YES       Redness: no        Pain: moderate       Warmth: no   Progression of symptoms worse  Accompanying signs and symptoms:       Fevers: no        Numbness/tingling/weakness: no   History        Trauma to the area: no         Previous history of Gout: no         Alcohol usage: no         Diuretic use: YES - chlorthalidone for previous swelling        Recent illness: no         Previous similar problem: YES        Previous evaluation: YES- UC yesterday  Aggravating factors include: standing  Therapies tried and outcome: prednisone for knee pain - helped for a few days then came back   Have you had any surgeries on your arteries of veins: No          Review of Systems     As above      Objective    /80 (BP Location: Left arm, Patient Position: Sitting, Cuff Size: Adult Large)   Pulse 55   Temp 98.7  F (37.1  C) (Oral)   Resp 14   Wt 99.3 kg (219 lb)   SpO2 97%   BMI 28.41 kg/m    Body mass index is 28.41 kg/m .  Physical Exam     Well appearing, no distress  Lower extremities with chronic discoloration of skin from heel to knees bilaterally. There is 1+ pitting edema on the right leg to below the knee and trace on the left.   No open skin lesions.   Varicose veins throughout lower extremities.       Results for orders placed or performed in visit on 07/21/23   US Lower Extremity Venous Duplex Bilateral     Status: None    Narrative    ULTRASOUND LOWER EXTREMITY VENOUS DUPLEX BILATERAL 7/21/2023 11:30 AM    CLINICAL HISTORY: Swelling of lower extremity      COMPARISONS: None available.    REFERRING PROVIDER: DIANA DE LA FUENTE    TECHNIQUE: Grayscale, color Doppler, Doppler waveform ultrasound  evaluation was performed through the bilateral common femoral,  femoral, and popliteal veins. Bilateral posterior tibial  "and peroneal  veins were evaluated with grayscale imaging and compression.    FINDINGS: Right common femoral, femoral, and popliteal veins are fully  compressible, patent, and demonstrate normal phasic Doppler waveforms  and/or augment normally..    Right posterior tibial veins are fully compressible to the ankle.    Right peroneal veins are fully compressible to the distal calf.    Left common femoral, femoral, and popliteal veins are fully  compressible, patent, and demonstrate normal phasic Doppler waveforms  and/or augment normally.    Left posterior tibial veins are fully compressible to the ankle.    Left peroneal veins are fully compressible to the distal calf.      Impression    IMPRESSION: No deep venous thrombosis demonstrated in either leg.    Reference: \"Duplex Ultrasound in the Diagnosis of Lower-Extremity Deep  Venous Thrombosis\"- Janet Elliott MD, S; Sloan Alexander MD  (Circulation. 2014;129:917-921. http://circ.ahajournals.org)    INDIO BLACK MD         SYSTEM ID:  [id number]                    "

## 2023-07-26 ENCOUNTER — TELEPHONE (OUTPATIENT)
Dept: FAMILY MEDICINE | Facility: CLINIC | Age: 63
End: 2023-07-26
Payer: COMMERCIAL

## 2023-07-26 NOTE — TELEPHONE ENCOUNTER
ACE wraps can be used but cannot apply uniform pressure like compression stocking; we have over the counter compression stocking can ask at his pharmacy and see if affordable

## 2023-07-26 NOTE — TELEPHONE ENCOUNTER
"Received call from patient. He states that he received and order for Compression Sleeve/Stocking Order for DME; was told by the supplier that his insurance does not cover this or the custom fitting. He is wondering what alternate plan will work for him if this is not an option. Specifically asking about ACE wraps.    Per OV note 7/21/23:  \"Swelling of lower extremity due to venous insufficiency: seen in urgent care yesterday with acute on chronic leg pain (though mostly right knee) and swelling in the calves with skin discoloration that is both chronic and acutely worsened as well. Concern for DVT given the change and we did complete lower extremity ultrasound which was normal. Discussed that I recommend compression stockings given his longstanding history of what sounds like venous insufficiency as this would be the first line of treatment and further workup or intervention may not be needed. He doesn't have any skin ulceration or concerning other findings. Discussed concerning symptoms for which to return to urgent care or the ED.   - US Lower Extremity Venous Duplex Bilateral; Future  - Compression Sleeve/Stocking Order for DME - ONLY FOR DME\"    Callback: [phone]    CJ Yarbrough RN  [facility] [place]  Primary Care  "

## 2023-07-26 NOTE — TELEPHONE ENCOUNTER
Patient notified of provider message as written. Patient verbalized good understanding.     Samaria SCHWARZN, RN  [facility]

## 2023-08-29 ENCOUNTER — OFFICE VISIT (OUTPATIENT)
Dept: FAMILY MEDICINE | Facility: CLINIC | Age: 63
End: 2023-08-29
Payer: COMMERCIAL

## 2023-08-29 VITALS
HEART RATE: 54 BPM | BODY MASS INDEX: 28.86 KG/M2 | OXYGEN SATURATION: 96 % | HEIGHT: 73 IN | WEIGHT: 217.8 LBS | SYSTOLIC BLOOD PRESSURE: 138 MMHG | RESPIRATION RATE: 18 BRPM | DIASTOLIC BLOOD PRESSURE: 80 MMHG

## 2023-08-29 DIAGNOSIS — Z85.46 HISTORY OF PROSTATE CANCER: ICD-10-CM

## 2023-08-29 DIAGNOSIS — M79.89 RIGHT LEG SWELLING: ICD-10-CM

## 2023-08-29 DIAGNOSIS — Z00.00 ENCOUNTER FOR MEDICARE ANNUAL WELLNESS EXAM: Primary | ICD-10-CM

## 2023-08-29 DIAGNOSIS — I10 ESSENTIAL HYPERTENSION: ICD-10-CM

## 2023-08-29 LAB
CHOLEST SERPL-MCNC: 144 MG/DL
HDLC SERPL-MCNC: 43 MG/DL
LDLC SERPL CALC-MCNC: 74 MG/DL
NONHDLC SERPL-MCNC: 101 MG/DL
PSA SERPL DL<=0.01 NG/ML-MCNC: 0.36 NG/ML (ref 0–4.5)
TRIGL SERPL-MCNC: 134 MG/DL

## 2023-08-29 PROCEDURE — 99397 PER PM REEVAL EST PAT 65+ YR: CPT | Mod: 25 | Performed by: FAMILY MEDICINE

## 2023-08-29 PROCEDURE — G0009 ADMIN PNEUMOCOCCAL VACCINE: HCPCS | Performed by: FAMILY MEDICINE

## 2023-08-29 PROCEDURE — 80061 LIPID PANEL: CPT | Performed by: FAMILY MEDICINE

## 2023-08-29 PROCEDURE — 36415 COLL VENOUS BLD VENIPUNCTURE: CPT | Performed by: FAMILY MEDICINE

## 2023-08-29 PROCEDURE — 90677 PCV20 VACCINE IM: CPT | Performed by: FAMILY MEDICINE

## 2023-08-29 PROCEDURE — 84153 ASSAY OF PSA TOTAL: CPT | Performed by: FAMILY MEDICINE

## 2023-08-29 RX ORDER — CHLORTHALIDONE 25 MG/1
25 TABLET ORAL DAILY
Qty: 90 TABLET | Refills: 1 | Status: SHIPPED | OUTPATIENT
Start: 2023-08-29 | End: 2024-02-26

## 2023-08-29 ASSESSMENT — PATIENT HEALTH QUESTIONNAIRE - PHQ9: SUM OF ALL RESPONSES TO PHQ QUESTIONS 1-9: 3

## 2023-08-29 ASSESSMENT — PAIN SCALES - GENERAL: PAINLEVEL: NO PAIN (0)

## 2023-08-29 NOTE — PROGRESS NOTES
"SUBJECTIVE:   Adriel is a 63 year old who presents for Preventive Visit.      8/29/2023     3:03 PM   Additional Questions   Roomed by Yara BECK   Accompanied by self     Are you in the first 12 months of your Medicare coverage?  No    Healthy Habits:     In general, how would you rate your overall health?  Good    Frequency of exercise:  None    Do you usually eat at least 4 servings of fruit and vegetables a day, include whole grains    & fiber and avoid regularly eating high fat or \"junk\" foods?  No    Taking medications regularly:  Yes    Barriers to taking medications:  Not applicable    Medication side effects:  Not applicable    Home Safety:  No safety concerns identified    Hearing Impairment:  No hearing concerns    In the past 6 months, have you been bothered by leaking of urine?  No    In general, how would you rate your overall mental or emotional health?  Good    Additional concerns today:  No    Have you ever done Advance Care Planning? (For example, a Health Directive, POLST, or a discussion with a medical provider or your loved ones about your wishes): No, advance care planning information given to patient to review.  Patient declined advance care planning discussion at this time.     Fall risk  Fallen 2 or more times in the past year?: No  Any fall with injury in the past year?: No      Cognitive Screening   1) Repeat 3 items (Leader, Season, Table)   2) Clock draw: NORMAL  3) 3 item recall: Recalls 3 objects  Results: 3 items recalled: COGNITIVE IMPAIRMENT LESS LIKELY    Mini-CogTM Copyright S Sal. Licensed by the author for use in [facility]; reprinted with permission (monica@.[facility]). All rights reserved.      Do you have sleep apnea, excessive snoring or daytime drowsiness? : no    Reviewed and updated as needed this visit by clinical staff   Tobacco  Allergies  Meds              Reviewed and updated as needed this visit by Provider                 Social History     Tobacco Use    " Smoking status: Never    Smokeless tobacco: Never   Substance Use Topics    Alcohol use: Yes     Alcohol/week: 0.0 standard drinks of alcohol     Comment: Rare        Do you have a current opioid prescription? No  Do you use any other controlled substances or medications that are not prescribed by a provider? None    PROBLEMS TO ADD ON...    Hypertension Follow-up    Do you check your blood pressure regularly outside of the clinic? Yes   Are you following a low salt diet? Yes  Are your blood pressures ever more than 140 on the top number (systolic) OR more   than 90 on the bottom number (diastolic), for example 140/90? No  How many servings of fruits and vegetables do you eat daily?  2-3  On average, how many sweetened beverages do you drink each day (Examples: soda, juice, sweet tea, etc.  Do NOT count diet or artificially sweetened beverages)?   0  How many days per week do you exercise enough to make your heart beat faster? 3 or less  How many minutes a day do you exercise enough to make your heart beat faster? Not sure  How many days per week do you miss taking your medication? 0    Current providers sharing in care for this patient include:   Patient Care Team:  Luis E Gill MD as PCP - General (Family Practice)  Luis E iGll MD as Assigned PCP    The following health maintenance items are reviewed in Epic and correct as of today:  Health Maintenance   Topic Date Due    ANNUAL REVIEW OF  ORDERS  Never done    Pneumococcal Vaccine: 65+ Years (1 - PCV) Never done    HIV SCREENING  Never done    ZOSTER IMMUNIZATION (1 of 2) Never done    EYE EXAM  04/13/2017    COVID-19 Vaccine (3 - Moderna series) 02/24/2022    MEDICARE ANNUAL WELLNESS VISIT  02/23/2023    AORTIC ANEURYSM SCREENING (SYSTEM ASSIGNED)  Never done    PHQ-9  09/15/2023    INFLUENZA VACCINE (1) 09/01/2023    TSH W/FREE T4 REFLEX  03/18/2024    FALL RISK ASSESSMENT  08/29/2024    LIPID  10/20/2025    ADVANCE CARE PLANNING   "10/21/2025    COLORECTAL CANCER SCREENING  07/11/2027    DTAP/TDAP/TD IMMUNIZATION (2 - Td or Tdap) 07/09/2028    HEPATITIS C SCREENING  Completed    DEPRESSION ACTION PLAN  Completed    IPV IMMUNIZATION  Aged Out    MENINGITIS IMMUNIZATION  Aged Out     Patient Active Problem List   Diagnosis    Hypothyroidism    Major depressive disorder with single episode, remission status unspecified    Thrombocytopenia (H)     No past surgical history on file.    Social History     Tobacco Use    Smoking status: Never    Smokeless tobacco: Never   Substance Use Topics    Alcohol use: Yes     Alcohol/week: 0.0 standard drinks of alcohol     Comment: Rare      Family History   Problem Relation Age of Onset    Cancer No family hx of     Diabetes No family hx of     Hypertension No family hx of     Cerebrovascular Disease No family hx of     Macular Degeneration No family hx of     Glaucoma No family hx of     Thyroid Disease No family hx of              Review of Systems  Constitutional, HEENT, cardiovascular, pulmonary, GI, , musculoskeletal, neuro, skin, endocrine and psych systems are negative, except as otherwise noted.    OBJECTIVE:   BP (!) 158/85 (BP Location: Left arm, Cuff Size: Adult Regular)   Pulse 54   Resp 18   Ht 1.86 m (6' 1.23\")   Wt 98.8 kg (217 lb 12.8 oz)   SpO2 96%   BMI 28.56 kg/m   Estimated body mass index is 28.56 kg/m  as calculated from the following:    Height as of this encounter: 1.86 m (6' 1.23\").    Weight as of this encounter: 98.8 kg (217 lb 12.8 oz).  Physical Exam  GENERAL: healthy, alert and no distress  EYES: Eyes grossly normal to inspection, PERRL and conjunctivae and sclerae normal  HENT: ear canals and TM's normal, nose and mouth without ulcers or lesions  NECK: no adenopathy and thyroid normal to palpation  RESP: lungs clear to auscultation - no rales, rhonchi or wheezes  CV: regular rate and rhythm, normal S1 S2, no S3 or S4, no murmur, click or rub, no peripheral edema "   ABDOMEN: soft, nontender, no hepatosplenomegaly, no masses and bowel sounds normal  MS: no gross musculoskeletal defects noted, no edema  SKIN: no suspicious lesions or rashes  NEURO: Normal strength and tone, mentation intact and speech normal  PSYCH: mentation appears normal, affect normal/bright    Diagnostic Test Results:  Labs reviewed in Epic    ASSESSMENT / PLAN:   Adriel was seen today for physical.    Diagnoses and all orders for this visit:    Encounter for Medicare annual wellness exam  -     Lipid Profile (Chol, Trig, HDL, LDL calc); Future  -     Lipid Profile (Chol, Trig, HDL, LDL calc)    Essential hypertension     Reviewed last labs (BMP, CBC, TSH for ER visit), medications refilled         -     chlorthalidone (HYGROTON) 25 MG tablet; Take 1 tablet (25 mg) by mouth daily    History of prostate cancer  -     PSA, tumor marker; Future  -     PSA, tumor marker    BMI 28.0-28.9,adult        -   Counseled to make better food choices, exercise as tolerated, and lose weight.    Right leg swelling  -     chlorthalidone (HYGROTON) 25 MG tablet; Take 1 tablet (25 mg) by mouth daily    Other orders  -     PNEUMOCOCCAL 20 VALENT CONJUGATE (PREVNAR 20)  -     PRIMARY CARE FOLLOW-UP SCHEDULING; Future        Patient has been advised of split billing requirements and indicates understanding: Yes      COUNSELING:  Reviewed preventive health counseling, as reflected in patient instructions       Regular exercise       Healthy diet/nutrition       Fall risk prevention       Immunizations  Vaccinated for: Pneumococcal        He reports that he has never smoked. He has never used smokeless tobacco.      Appropriate preventive services were discussed with this patient, including applicable screening as appropriate for cardiovascular disease, diabetes, osteopenia/osteoporosis, and glaucoma.  As appropriate for age/gender, discussed screening for colorectal cancer, prostate cancer, breast cancer, and cervical cancer.  Checklist reviewing preventive services available has been given to the patient.    Reviewed patients plan of care and provided an AVS. The Basic Care Plan (routine screening as documented in Health Maintenance) for Adriel meets the Care Plan requirement. This Care Plan has been established and reviewed with the Patient.    {Counseling Resources  [place] Preventive Services Task Force  Cholesterol Screening  Health diet/nutrition  Pooled Cohorts Equation Calculator  [employer]'s MyPlate  ASA Prophylaxis  Lung CA Screening  Osteoporosis prevention/bone health   {Prostate Cancer Screening  Consider for men 55-69 per guidance from USPSTF    Luis E Gill MD  [facility]    Identified Health Risks:  I have reviewed Opioid Use Disorder and Substance Use Disorder risk factors and made any needed referrals.   He is at risk for lack of exercise and has been provided with information to increase physical activity for the benefit of his well-being.  The patient was counseled and encouraged to consider modifying their diet and eating habits. He was provided with information on recommended healthy diet options.

## 2023-08-29 NOTE — LETTER
August 31, 2023    Adriel Gamboayang  [address]  [place] [address]          Dear ,    We are writing to inform you of your test results.  Mail patient normal results       Resulted Orders   Lipid Profile (Chol, Trig, HDL, LDL calc)   Result Value Ref Range    Cholesterol 144 <200 mg/dL    Triglycerides 134 <150 mg/dL    Direct Measure HDL 43 >=40 mg/dL    LDL Cholesterol Calculated 74 <=100 mg/dL    Non HDL Cholesterol 101 <130 mg/dL    Narrative    Cholesterol  Desirable:  <200 mg/dL    Triglycerides  Normal:  Less than 150 mg/dL  Borderline High:  150-199 mg/dL  High:  200-499 mg/dL  Very High:  Greater than or equal to 500 mg/dL    Direct Measure HDL  Female:  Greater than or equal to 50 mg/dL   Male:  Greater than or equal to 40 mg/dL    LDL Cholesterol  Desirable:  <100mg/dL  Above Desirable:  100-129 mg/dL   Borderline High:  130-159 mg/dL   High:  160-189 mg/dL   Very High:  >= 190 mg/dL    Non HDL Cholesterol  Desirable:  130 mg/dL  Above Desirable:  130-159 mg/dL  Borderline High:  160-189 mg/dL  High:  190-219 mg/dL  Very High:  Greater than or equal to 220 mg/dL   PSA, tumor marker   Result Value Ref Range    PSA Tumor Marker 0.36 0.00 - 4.50 ng/mL    Narrative    This result is obtained using the Roche Elecsys total PSA method on the derik e801 immunoassay analyzer. Results obtained with different assay methods or kits cannot be used interchangeably.       If you have any questions or concerns, please call the clinic at the number listed above.       Sincerely,      Luis E Gill MD

## 2023-08-29 NOTE — PATIENT INSTRUCTIONS
"Patient Education   Personalized Prevention Plan  You are due for the preventive services outlined below.  Your care team is available to assist you in scheduling these services.  If you have already completed any of these items, please share that information with your care team to update in your medical record.  Health Maintenance Due   Topic Date Due     ANNUAL REVIEW OF HM ORDERS  Never done     Pneumococcal Vaccine (1 - PCV) Never done     HIV Screening  Never done     Zoster (Shingles) Vaccine (1 of 2) Never done     Eye Exam  04/13/2017     COVID-19 Vaccine (3 - Moderna series) 02/24/2022     Annual Wellness Visit  02/23/2023     AORTIC ANEURYSM SCREENING (SYSTEM ASSIGNED)  Never done     Learning About Being Physically Active  What is physical activity?     Being physically active means doing any kind of activity that gets your body moving.  The types of physical activity that can help you get fit and stay healthy include:  Aerobic or \"cardio\" activities. These make your heart beat faster and make you breathe harder, such as brisk walking, riding a bike, or running. They strengthen your heart and lungs and build up your endurance.  Strength training activities. These make your muscles work against, or \"resist,\" something. Examples include lifting weights or doing push-ups. These activities help tone and strengthen your muscles and bones.  Stretches. These let you move your joints and muscles through their full range of motion. Stretching helps you be more flexible.  Reaching a balance between these three types of physical activity is important because each one contributes to your overall fitness.  What are the benefits of being active?  Being active is one of the best things you can do for your health. It helps you to:  Feel stronger and have more energy to do all the things you like to do.  Focus better at school or work.  Feel, think, and sleep better.  Reach and stay at a healthy weight.  Lose fat and " "build lean muscle.  Lower your risk for serious health problems, including diabetes, heart attack, high blood pressure, and some cancers.  Keep your heart, lungs, bones, muscles, and joints strong and healthy.  How can you make being active part of your life?  Start slowly. Make it your long-term goal to get at least 30 minutes of exercise on most days of the week. Walking is a good choice. You also may want to do other activities, such as running, swimming, cycling, or playing tennis or team sports.  Pick activities that you like--ones that make your heart beat faster, your muscles stronger, and your muscles and joints more flexible. If you find more than one thing you like doing, do them all. You don't have to do the same thing every day.  Get your heart pumping every day. Any activity that makes your heart beat faster and keeps it at that rate for a while counts.  Here are some great ways to get your heart beating faster:  Go for a brisk walk, run, or bike ride.  Go for a hike or swim.  Go in-line skating.  Play a game of touch football, basketball, or soccer.  Ride a bike.  Play tennis or racquetball.  Climb stairs.  Even some household chores can be aerobic--just do them at a faster pace. Vacuuming, raking or mowing the lawn, sweeping the garage, and washing and waxing the car all can help get your heart rate up.  Strengthen your muscles during the week. You don't have to lift heavy weights or grow big, bulky muscles to get stronger. Doing a few simple activities that make your muscles work against, or \"resist,\" something can help you get stronger.  For example, you can:  Do push-ups or sit-ups, which use your own body weight as resistance.  Lift weights or dumbbells or use stretch bands at home or in a gym or community center.  Stretch your muscles often. Stretching will help you as you become more active. It can help you stay flexible, loosen tight muscles, and avoid injury. It can also help improve your " "balance and posture and can be a great way to relax.  Be sure to stretch the muscles you'll be using when you work out. It's best to warm your muscles slightly before you stretch them. Walk or do some other light aerobic activity for a few minutes, and then start stretching.  When you stretch your muscles:  Do it slowly. Stretching is not about going fast or making sudden movements.  Don't push or bounce during a stretch.  Hold each stretch for at least 15 to 30 seconds, if you can. You should feel a stretch in the muscle, but not pain.  Breathe out as you do the stretch. Then breathe in as you hold the stretch. Don't hold your breath.  If you're worried about how more activity might affect your health, have a checkup before you start. Follow any special advice your doctor gives you for getting a smart start.  Where can you learn more?  Go to https://[email or web address].com/patiented  Enter W332 in the search box to learn more about \"Learning About Being Physically Active.\"  Current as of: October 10, 2022               Content Version: 13.7    1715-5170 [email or web address].   Care instructions adapted under license by your healthcare professional. If you have questions about a medical condition or this instruction, always ask your healthcare professional. [email or web address] disclaims any warranty or liability for your use of this information.      Learning About Dietary Guidelines  What are the Dietary Guidelines for Americans?     Dietary Guidelines for Americans provide tips for eating well and staying healthy. This helps reduce the risk for long-term (chronic) diseases.  These guidelines recommend that you:  Eat and drink the right amount for you. The U.S. government's food guide is called MyPlate. It can help you make your own well-balanced eating plan.  Try to balance your eating with your activity. This helps you stay at a healthy weight.  Drink alcohol in moderation, if at all.  Limit foods high in " "salt, saturated fat, trans fat, and added sugar.  These guidelines are from the U.S. Department of Agriculture and the U.S. Department of Health and Human Services. They are updated every 5 years.  What is MyPlate?  MyPlate is the U.S. government's food guide. It can help you make your own well-balanced eating plan. A balanced eating plan means that you eat enough, but not too much, and that your food gives you the nutrients you need to stay healthy.  MyPlate focuses on eating plenty of whole grains, fruits, and vegetables, and on limiting fat and sugar. It is available online at www.ChooseMyPlate.gov.  How can you get started?  If you're trying to eat healthier, you can slowly change your eating habits over time. You don't have to make big changes all at once. Start by adding one or two healthy foods to your meals each day.  Grains  Choose whole-grain breads and cereals and whole-wheat pasta and whole-grain crackers.  Vegetables  Eat a variety of vegetables every day. They have lots of nutrients and are part of a heart-healthy diet.  Fruits  Eat a variety of fruits every day. Fruits contain lots of nutrients. Choose fresh fruit instead of fruit juice.  Protein foods  Choose fish and lean poultry more often. Eat red meat and fried meats less often. Dried beans, tofu, and nuts are also good sources of protein.  Dairy  Choose low-fat or fat-free products from this food group. If you have problems digesting milk, try eating cheese or yogurt instead.  Fats and oils  Limit fats and oils if you're trying to cut calories. Choose healthy fats when you cook. These include canola oil and olive oil.  Where can you learn more?  Go to https://www.healthCareOne.net/patiented  Enter D676 in the search box to learn more about \"Learning About Dietary Guidelines.\"  Current as of: March 1, 2023               Content Version: 13.7    4817-8672 [employer].   Care instructions adapted under license by your healthcare " professional. If you have questions about a medical condition or this instruction, always ask your healthcare professional. [employer] disclaims any warranty or liability for your use of this information.

## 2023-09-05 DIAGNOSIS — E06.3 HYPOTHYROIDISM DUE TO HASHIMOTO'S THYROIDITIS: ICD-10-CM

## 2023-09-06 RX ORDER — LEVOTHYROXINE SODIUM 88 UG/1
TABLET ORAL
Qty: 90 TABLET | Refills: 0 | Status: SHIPPED | OUTPATIENT
Start: 2023-09-06 | End: 2023-11-30

## 2023-11-21 ENCOUNTER — ALLIED HEALTH/NURSE VISIT (OUTPATIENT)
Dept: FAMILY MEDICINE | Facility: CLINIC | Age: 63
End: 2023-11-21
Payer: COMMERCIAL

## 2023-11-21 VITALS — DIASTOLIC BLOOD PRESSURE: 82 MMHG | SYSTOLIC BLOOD PRESSURE: 136 MMHG

## 2023-11-21 DIAGNOSIS — Z01.30 BP CHECK: Primary | ICD-10-CM

## 2023-11-21 PROCEDURE — 99207 PR NO CHARGE NURSE ONLY: CPT | Performed by: FAMILY MEDICINE

## 2023-11-21 NOTE — PROGRESS NOTES
Adriel Elam was evaluated at [facility] on November 21, 2023 at which time his blood pressure was:    BP Readings from Last 1 Encounters:   11/21/23 136/82     No data recorded      Reviewed lifestyle modifications for blood pressure control and reduction: including making healthy food choices, managing weight, getting regular exercise, smoking cessation, reducing alcohol consumption, monitoring blood pressure regularly.     Symptoms: None    BP Goal:< 140/90 mmHg    BP Assessment:  BP at goal    Potential Reasons for BP too high: NA - Not applicable    BP Follow-Up Plan: Recheck BP in 6 months at pharmacy    Recommendation to Provider: continue current therapy    Note completed by: Jenelle Lopez, PharmD    [phone]

## 2023-11-30 DIAGNOSIS — E06.3 HYPOTHYROIDISM DUE TO HASHIMOTO'S THYROIDITIS: ICD-10-CM

## 2023-11-30 RX ORDER — LEVOTHYROXINE SODIUM 88 UG/1
TABLET ORAL
Qty: 90 TABLET | Refills: 0 | Status: SHIPPED | OUTPATIENT
Start: 2023-11-30 | End: 2024-02-23

## 2024-02-16 ENCOUNTER — OFFICE VISIT (OUTPATIENT)
Dept: OPHTHALMOLOGY | Facility: CLINIC | Age: 64
End: 2024-02-16
Payer: COMMERCIAL

## 2024-02-16 DIAGNOSIS — H43.393 VITREOUS FLOATERS OF BOTH EYES: Primary | ICD-10-CM

## 2024-02-16 DIAGNOSIS — H52.4 PRESBYOPIA: ICD-10-CM

## 2024-02-16 PROCEDURE — 92004 COMPRE OPH EXAM NEW PT 1/>: CPT | Performed by: OPTOMETRIST

## 2024-02-16 PROCEDURE — 92015 DETERMINE REFRACTIVE STATE: CPT | Performed by: OPTOMETRIST

## 2024-02-16 ASSESSMENT — VISUAL ACUITY
OD_SC: 20/25
OS_CC: 20/20
METHOD: SNELLEN - LINEAR
CORRECTION_TYPE: GLASSES
OS_SC: 20/30
OD_CC+: -2
OD_CC: 20/30
OS_CC+: -3

## 2024-02-16 ASSESSMENT — CONF VISUAL FIELD
OD_SUPERIOR_TEMPORAL_RESTRICTION: 0
OD_INFERIOR_NASAL_RESTRICTION: 0
OS_SUPERIOR_TEMPORAL_RESTRICTION: 0
METHOD: COUNTING FINGERS
OD_INFERIOR_TEMPORAL_RESTRICTION: 0
OS_INFERIOR_NASAL_RESTRICTION: 0
OD_NORMAL: 1
OS_INFERIOR_TEMPORAL_RESTRICTION: 0
OS_SUPERIOR_NASAL_RESTRICTION: 0
OS_NORMAL: 1
OD_SUPERIOR_NASAL_RESTRICTION: 0

## 2024-02-16 ASSESSMENT — EXTERNAL EXAM - RIGHT EYE: OD_EXAM: NORMAL

## 2024-02-16 ASSESSMENT — TONOMETRY
IOP_METHOD: ICARE
OS_IOP_MMHG: 16
OD_IOP_MMHG: 16

## 2024-02-16 ASSESSMENT — REFRACTION_WEARINGRX
OS_SPHERE: -1.50
OD_AXIS: 074
OS_AXIS: 101
OD_SPHERE: -1.75
SPECS_TYPE: SVL
OS_CYLINDER: +0.75
OD_CYLINDER: +1.00

## 2024-02-16 ASSESSMENT — REFRACTION_MANIFEST
OD_CYLINDER: +0.75
OS_SPHERE: -1.00
OS_ADD: +2.50
OD_ADD: +2.50
OD_SPHERE: -0.75
OS_CYLINDER: +0.75
OD_AXIS: 070
OS_AXIS: 105

## 2024-02-16 ASSESSMENT — SLIT LAMP EXAM - LIDS
COMMENTS: NORMAL
COMMENTS: NORMAL

## 2024-02-16 ASSESSMENT — CUP TO DISC RATIO
OD_RATIO: 0.2
OS_RATIO: 0.2

## 2024-02-16 ASSESSMENT — EXTERNAL EXAM - LEFT EYE: OS_EXAM: NORMAL

## 2024-02-16 NOTE — PROGRESS NOTES
Assessment/Plan  (H43.393) Vitreous floaters of both eyes  (primary encounter diagnosis)  Comment: Stable. Not impacting vision.   Plan: Monitor regularly. Recommend returning in 1-2 years for repeat dilated exam. Return sooner with new flashes or floaters in vision.     (H52.4) Presbyopia  Plan: REFRACTION [[id number]]        Discussed findings with patient. New spectacle prescription dispensed to patient. Patient is welcome to return to clinic with prolonged adaptation difficulties. Patient has adequate uncorrected vision for driving.     Complete documentation of historical and exam elements from today's encounter can  be found in the full encounter summary report (not reduplicated in this progress  note). I personally obtained the chief complaint(s) and history of present illness. I  confirmed and edited as necessary the review of systems, past medical/surgical  history, family history, social history, and examination findings as documented by  others; and I examined the patient myself. I personally reviewed the relevant tests,  images, and reports as documented above. I formulated and edited as necessary the  assessment and plan and discussed the findings and management plan with the  patient and family.    Pilo Taveras, OD

## 2024-02-16 NOTE — NURSING NOTE
Chief Complaints and History of Present Illnesses   Patient presents with    Failed 's Eye Exam     Chief Complaint(s) and History of Present Illness(es)       Failed 's Eye Exam              Laterality: both eyes    Associated symptoms: floaters.  Negative for eye pain and flashes    Treatments tried: no treatments              Comments    Here for failed 's eye exam. VA was blurry at the time of the test - notes possibly wearing reading glasses. Stable floaters without flashes. No eye pain.    Adelfo Franklin COT 9:01 AM February 16, 2024

## 2024-02-23 DIAGNOSIS — E06.3 HYPOTHYROIDISM DUE TO HASHIMOTO'S THYROIDITIS: ICD-10-CM

## 2024-02-23 RX ORDER — LEVOTHYROXINE SODIUM 88 UG/1
TABLET ORAL
Qty: 90 TABLET | Refills: 0 | Status: SHIPPED | OUTPATIENT
Start: 2024-02-23 | End: 2024-05-21

## 2024-02-26 DIAGNOSIS — M79.89 RIGHT LEG SWELLING: ICD-10-CM

## 2024-02-26 DIAGNOSIS — I10 ESSENTIAL HYPERTENSION: ICD-10-CM

## 2024-02-26 RX ORDER — CHLORTHALIDONE 25 MG/1
25 TABLET ORAL DAILY
Qty: 90 TABLET | Refills: 0 | Status: SHIPPED | OUTPATIENT
Start: 2024-02-26 | End: 2024-05-21

## 2024-05-21 DIAGNOSIS — I10 ESSENTIAL HYPERTENSION: ICD-10-CM

## 2024-05-21 DIAGNOSIS — M79.89 RIGHT LEG SWELLING: ICD-10-CM

## 2024-05-21 DIAGNOSIS — E06.3 HYPOTHYROIDISM DUE TO HASHIMOTO'S THYROIDITIS: ICD-10-CM

## 2024-05-21 RX ORDER — CHLORTHALIDONE 25 MG/1
25 TABLET ORAL DAILY
Qty: 90 TABLET | Refills: 0 | Status: SHIPPED | OUTPATIENT
Start: 2024-05-21 | End: 2024-08-15

## 2024-05-21 RX ORDER — LEVOTHYROXINE SODIUM 88 UG/1
TABLET ORAL
Qty: 30 TABLET | Refills: 0 | Status: SHIPPED | OUTPATIENT
Start: 2024-05-21 | End: 2024-06-17

## 2024-05-24 ENCOUNTER — ALLIED HEALTH/NURSE VISIT (OUTPATIENT)
Dept: FAMILY MEDICINE | Facility: CLINIC | Age: 64
End: 2024-05-24
Payer: COMMERCIAL

## 2024-05-24 VITALS — DIASTOLIC BLOOD PRESSURE: 82 MMHG | SYSTOLIC BLOOD PRESSURE: 132 MMHG

## 2024-05-24 DIAGNOSIS — Z01.30 BP CHECK: Primary | ICD-10-CM

## 2024-05-24 PROCEDURE — 99207 PR NO CHARGE NURSE ONLY: CPT | Performed by: FAMILY MEDICINE

## 2024-05-24 NOTE — PROGRESS NOTES
Adriel Elam was evaluated at [facility] on May 24, 2024 at which time his blood pressure was:    BP Readings from Last 1 Encounters:   05/24/24 132/82     No data recorded      Reviewed lifestyle modifications for blood pressure control and reduction: including making healthy food choices, managing weight, getting regular exercise, smoking cessation, reducing alcohol consumption, monitoring blood pressure regularly.     Symptoms: None    BP Goal:< 140/90 mmHg    BP Assessment:  BP at goal    Potential Reasons for BP too high: NA - Not applicable    BP Follow-Up Plan: Recheck BP in 6 months at pharmacy    Recommendation to Provider: continue current plan    Note completed by: Jenelle Lopez, PharmD      [phone]

## 2024-06-06 ENCOUNTER — TELEPHONE (OUTPATIENT)
Dept: FAMILY MEDICINE | Facility: CLINIC | Age: 64
End: 2024-06-06
Payer: COMMERCIAL

## 2024-06-06 NOTE — TELEPHONE ENCOUNTER
General Call      Reason for Call:  patient called and went to [facility].    Left ankle injury due to falling off ladder.    Wants to let Anais Starkey at [facility] know that he is going to [facility] today.    Will bring x rays.    Refused appt and triage.    Wants a medication for the pain.    Thank you.    What are your questions or concerns:  yes    Date of last appointment with provider: na    Okay to leave a detailed message?: Yes at Cell number on file:    Telephone Information:   Mobile [phone]

## 2024-06-06 NOTE — TELEPHONE ENCOUNTER
Patient was seen at an outside hospital- [facility] ED for left ankle sprain.  Requesting pain meds    Please let patient know that he would need a follow-up appointment to reassess symptoms/pain and discuss any new med request    Lena Murcia RN  [facility]

## 2024-06-09 NOTE — PROGRESS NOTES
5/25/24  Adriel Elam is a 64 y.o. male   Emergency room course:presents with left ankle pain. Patient states he fell off a ladder twisting the ankle yesterday. Patient is able to bear weight with pain. Patient been icing it swelling is down but continues to hurt presented here for evaluation. Patient denies any other injury during the fall.    Emergency room course: Upon arrival to the emergency room patient history and physical taken nurse notes reviewed. Secondary to location of pain I did send her for an x-ray of that left ankle. She returned showing no acute fracture. Patient was placed in a Ace bandage for support and compression. We discussed icing and ibuprofen and patient was prepared for discharge.    Plan: Patient will be discharged home. Patient can continue to ice and elevate use ibuprofen.    Disposition  The patient was discharged.    New Prescriptions   No medications on file     ICD-10 Codes:    ICD-10-CM   1. Sprain of left ankle, unspecified ligament, initial encounter S93.402A

## 2024-06-10 ENCOUNTER — ANCILLARY PROCEDURE (OUTPATIENT)
Dept: GENERAL RADIOLOGY | Facility: CLINIC | Age: 64
End: 2024-06-10
Attending: FAMILY MEDICINE
Payer: COMMERCIAL

## 2024-06-10 ENCOUNTER — OFFICE VISIT (OUTPATIENT)
Dept: FAMILY MEDICINE | Facility: CLINIC | Age: 64
End: 2024-06-10
Payer: COMMERCIAL

## 2024-06-10 VITALS
SYSTOLIC BLOOD PRESSURE: 144 MMHG | HEART RATE: 62 BPM | TEMPERATURE: 98.8 F | RESPIRATION RATE: 19 BRPM | DIASTOLIC BLOOD PRESSURE: 80 MMHG | WEIGHT: 226.8 LBS | HEIGHT: 73 IN | OXYGEN SATURATION: 95 % | BODY MASS INDEX: 30.06 KG/M2

## 2024-06-10 DIAGNOSIS — D69.6 THROMBOCYTOPENIA (H): ICD-10-CM
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
## 2024-06-10 DIAGNOSIS — F41.1 GAD (GENERALIZED ANXIETY DISORDER): ICD-10-CM

## 2024-06-10 DIAGNOSIS — I10 HTN, GOAL BELOW 140/90: ICD-10-CM

## 2024-06-10 DIAGNOSIS — S93.402D SPRAIN OF LEFT ANKLE, UNSPECIFIED LIGAMENT, SUBSEQUENT ENCOUNTER: ICD-10-CM
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
## 2024-06-10 DIAGNOSIS — E03.9 ACQUIRED HYPOTHYROIDISM: ICD-10-CM

## 2024-06-10 PROCEDURE — 84439 ASSAY OF FREE THYROXINE: CPT | Performed by: FAMILY MEDICINE

## 2024-06-10 PROCEDURE — 99214 OFFICE O/P EST MOD 30 MIN: CPT | Performed by: FAMILY MEDICINE

## 2024-06-10 PROCEDURE — 80053 COMPREHEN METABOLIC PANEL: CPT | Performed by: FAMILY MEDICINE

## 2024-06-10 PROCEDURE — 73610 X-RAY EXAM OF ANKLE: CPT | Mod: TC | Performed by: RADIOLOGY

## 2024-06-10 PROCEDURE — 80061 LIPID PANEL: CPT | Performed by: FAMILY MEDICINE

## 2024-06-10 PROCEDURE — 84443 ASSAY THYROID STIM HORMONE: CPT | Performed by: FAMILY MEDICINE

## 2024-06-10 PROCEDURE — 36415 COLL VENOUS BLD VENIPUNCTURE: CPT | Performed by: FAMILY MEDICINE

## 2024-06-10 RX ORDER — IBUPROFEN 600 MG/1
600 TABLET, FILM COATED ORAL EVERY 6 HOURS PRN
Qty: 20 TABLET | Refills: 0 | Status: SHIPPED | OUTPATIENT
Start: 2024-06-10 | End: 2024-06-21

## 2024-06-10 RX ORDER — BUSPIRONE HYDROCHLORIDE 15 MG/1
15 TABLET ORAL 2 TIMES DAILY
Qty: 180 TABLET | Refills: 0 | Status: SHIPPED | OUTPATIENT
Start: 2024-06-10

## 2024-06-10 RX ORDER — BUSPIRONE HYDROCHLORIDE 5 MG/1
5 TABLET ORAL 3 TIMES DAILY
COMMUNITY
End: 2024-06-11 | Stop reason: DRUGHIGH

## 2024-06-10 ASSESSMENT — PAIN SCALES - GENERAL: PAINLEVEL: EXTREME PAIN (8)

## 2024-06-10 NOTE — PROGRESS NOTES
"  Assessment & Plan     Fall, subsequent encounter  Repeat x-ray negative for acute fracture, discussed conservative management  - XR Ankle Left G/E 3 Views  - ibuprofen (ADVIL/MOTRIN) 600 MG tablet  Dispense: 20 tablet; Refill: 0    Sprain of left ankle, unspecified ligament, subsequent encounter  Ice NSAIDs and elevation  - XR Ankle Left G/E 3 Views  - ibuprofen (ADVIL/MOTRIN) 600 MG tablet  Dispense: 20 tablet; Refill: 0  - Comprehensive metabolic panel (BMP + Alb, Alk Phos, ALT, AST, Total. Bili, TP)  - Lipid Profile (Chol, Trig, HDL, LDL calc)  - Comprehensive metabolic panel (BMP + Alb, Alk Phos, ALT, AST, Total. Bili, TP)  - Lipid Profile (Chol, Trig, HDL, LDL calc)    Acquired hypothyroidism  No symptoms, due for TSH recheck  - TSH with free T4 reflex  - TSH with free T4 reflex    Thrombocytopenia (H24)    -Stable.  Likely constitutional    KIMANI (generalized anxiety disorder)  Symptoms well-controlled on buspirone, takes 15 mg twice daily; though takes them both in the morning  - busPIRone (BUSPAR) 15 MG tablet  Dispense: 180 tablet; Refill: 0    Hypertension:     Blood pressure slightly above goal, possibly pain related.  On hydrochlorothiazide.  Recheck at follow-up if still elevated will adjust medication    MED REC REQUIRED  Post Medication Reconciliation Status: patient was not discharged from an inpatient facility or TCU  BMI  Estimated body mass index is 29.73 kg/m  as calculated from the following:    Height as of this encounter: 1.86 m (6' 1.23\").    Weight as of this encounter: 102.9 kg (226 lb 12.8 oz).   Weight management plan: Discussed healthy diet and exercise guidelines      See Patient Instructions    Subjective   Adriel is a 64 year old, presenting for the following health issues:  Hospital F/U    Still having pain with walking   Ankle also swollen  Been doing ibuprofen and has ACE bandage on  He is on his feet about 11 hour a day.        6/10/2024     3:11 PM   Additional Questions   Roomed by " MAICOL MARIN   Accompanied by self     [facility]   ED/UC Followup:  Facility:  [facility]   Date of visit: 5/25/2024  Reason for visit: Sprain of ankle     Current Status: Still has pain with swelling     5/25/24  Adriel Elam is a 64 y.o. male   Emergency room course:presents with left ankle pain. Patient states he fell off a ladder twisting the ankle yesterday. Patient is able to bear weight with pain. Patient been icing it swelling is down but continues to hurt presented here for evaluation. Patient denies any other injury during the fall.    Emergency room course: Upon arrival to the emergency room patient history and physical taken nurse notes reviewed. Secondary to location of pain I did send her for an x-ray of that left ankle. She returned showing no acute fracture.     EXAM:  XR ANKLE LT 3 VIEW:  05/25/24  4:50 AM  FINDINGS:    No acute fracture or dislocation. Well-corticated fragments along the inferior aspect of the distal fibula, compatible with old trauma. Ankle mortise is congruent, nonweightbearing. Calcaneal enthesophyte. No focal osseous lesion.    Rx  Patient was placed in a Ace bandage for support and compression.   We discussed icing and ibuprofen and patient was prepared for discharge.    ICD-10-CM   1. Sprain of left ankle, unspecified ligament, initial encounter S93.402A      Hypertension Follow-up    Do you check your blood pressure regularly outside of the clinic? No   Are you following a low salt diet? Yes  Are your blood pressures ever more than 140 on the top number (systolic) OR more   than 90 on the bottom number (diastolic), for example 140/90? No    Hypothyroidism Follow-up    Since last visit, patient describes the following symptoms: Weight stable, no hair loss, no skin changes, no constipation, no loose stools    Review of Systems  Constitutional, HEENT, cardiovascular, pulmonary, gi and gu systems are negative, except as otherwise noted.      Objective    BP (!) 154/83 (BP  "Location: Right arm)   Pulse 62   Temp 98.8  F (37.1  C) (Temporal)   Resp 19   Ht 1.86 m (6' 1.23\")   Wt 102.9 kg (226 lb 12.8 oz)   SpO2 95%   BMI 29.73 kg/m    Body mass index is 29.73 kg/m .  Physical Exam   GENERAL: alert and no distress  RESP: lungs clear to auscultation - no rales, rhonchi or wheezes  CV: regular rate and rhythm, no murmur, click or rub, no peripheral edema  MS: Lt Ankle: Mildly swollen has ankle Ace wrap, able to bear weight.  Tenderness over the lateral malleolus          Signed Electronically by: Luis E Gill MD    "

## 2024-06-11 LAB
ALBUMIN SERPL BCG-MCNC: 4.3 G/DL (ref 3.5–5.2)
ALP SERPL-CCNC: 107 U/L (ref 40–150)
ALT SERPL W P-5'-P-CCNC: 18 U/L (ref 0–70)
ANION GAP SERPL CALCULATED.3IONS-SCNC: 11 MMOL/L (ref 7–15)
AST SERPL W P-5'-P-CCNC: 21 U/L (ref 0–45)
BILIRUB SERPL-MCNC: 0.5 MG/DL
BUN SERPL-MCNC: 26.4 MG/DL (ref 8–23)
CALCIUM SERPL-MCNC: 9.6 MG/DL (ref 8.8–10.2)
CHLORIDE SERPL-SCNC: 106 MMOL/L (ref 98–107)
CHOLEST SERPL-MCNC: 150 MG/DL
CREAT SERPL-MCNC: 1.21 MG/DL (ref 0.67–1.17)
DEPRECATED HCO3 PLAS-SCNC: 28 MMOL/L (ref 22–29)
EGFRCR SERPLBLD CKD-EPI 2021: 66 ML/MIN/1.73M2
FASTING STATUS PATIENT QL REPORTED: ABNORMAL
FASTING STATUS PATIENT QL REPORTED: ABNORMAL
GLUCOSE SERPL-MCNC: 98 MG/DL (ref 70–99)
HDLC SERPL-MCNC: 40 MG/DL
LDLC SERPL CALC-MCNC: 76 MG/DL
NONHDLC SERPL-MCNC: 110 MG/DL
POTASSIUM SERPL-SCNC: 4.5 MMOL/L (ref 3.4–5.3)
PROT SERPL-MCNC: 7.3 G/DL (ref 6.4–8.3)
SODIUM SERPL-SCNC: 145 MMOL/L (ref 135–145)
T4 FREE SERPL-MCNC: 1.25 NG/DL (ref 0.9–1.7)
TRIGL SERPL-MCNC: 172 MG/DL
TSH SERPL DL<=0.005 MIU/L-ACNC: 4.74 UIU/ML (ref 0.3–4.2)

## 2024-06-16 DIAGNOSIS — E06.3 HYPOTHYROIDISM DUE TO HASHIMOTO'S THYROIDITIS: ICD-10-CM

## 2024-06-17 RX ORDER — LEVOTHYROXINE SODIUM 88 UG/1
88 TABLET ORAL DAILY
Qty: 90 TABLET | Refills: 0 | Status: SHIPPED | OUTPATIENT
Start: 2024-06-17 | End: 2024-09-10

## 2024-06-21 ENCOUNTER — TELEPHONE (OUTPATIENT)
Dept: FAMILY MEDICINE | Facility: CLINIC | Age: 64
End: 2024-06-21
Payer: COMMERCIAL

## 2024-06-21 DIAGNOSIS — S93.402D SPRAIN OF LEFT ANKLE, UNSPECIFIED LIGAMENT, SUBSEQUENT ENCOUNTER: ICD-10-CM
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
## 2024-06-21 RX ORDER — IBUPROFEN 600 MG/1
600 TABLET, FILM COATED ORAL EVERY 6 HOURS PRN
Qty: 20 TABLET | Refills: 0 | Status: SHIPPED | OUTPATIENT
Start: 2024-06-21 | End: 2024-09-12

## 2024-06-21 NOTE — TELEPHONE ENCOUNTER
Received call from patient. He is calling to request a refill or further advice on Ibuprofen medication. Patient was prescribed Ibuprofen 600 mg due to sprain of his left ankle.    Patient is slowly getting better, however, feels that he still needs to use ibuprofen at least once a day.    Please advise.     CJ Nelson RN  [facility]

## 2024-06-27 NOTE — TELEPHONE ENCOUNTER
Received additional call from pt. Pt requested message be sent to covering provider to advise. Please see message below. Pt is having pain and is wondering if he can continue taking Ibuprofen? If not, needs something for his ankle pain.       Please advise.    Haydee Pemberton RN  [facility]

## 2024-06-27 NOTE — TELEPHONE ENCOUNTER
"Received call from patient. He reports taking Ibuprofen daily for L ankle sprain. He is regularly taking this 2x/day (sometimes 3x). He experienced a nosebleed x2 yesterday. It was very mild both times, able to stop it within 30 seconds. He stopped taking it today because he is concerned for bleeding as listed on \"side effects\". He reports Tylenol and Aspirin are ineffective for his pain. He is wondering if there is anything else he can do to manage the pain.     CJ Yarbrough RN  [facility] [place]  [facility]  "

## 2024-06-27 NOTE — TELEPHONE ENCOUNTER
Writer called patient and relayed provider's message as written, patient states understanding. Writer advised patient to call nurseline with any further concerning or lasting symptoms. Discussed how to care for nosebleeds if they occur again and when to seek care in ED if needed, patient states understanding, no further questions.    Thanks,  CJ Ewing RN  [facility]

## 2024-07-11 ENCOUNTER — TELEPHONE (OUTPATIENT)
Dept: FAMILY MEDICINE | Facility: CLINIC | Age: 64
End: 2024-07-11
Payer: COMMERCIAL

## 2024-07-12 ENCOUNTER — ALLIED HEALTH/NURSE VISIT (OUTPATIENT)
Dept: FAMILY MEDICINE | Facility: CLINIC | Age: 64
End: 2024-07-12

## 2024-07-12 ENCOUNTER — TELEPHONE (OUTPATIENT)
Dept: FAMILY MEDICINE | Facility: CLINIC | Age: 64
End: 2024-07-12

## 2024-07-12 VITALS — DIASTOLIC BLOOD PRESSURE: 82 MMHG | SYSTOLIC BLOOD PRESSURE: 136 MMHG

## 2024-07-12 DIAGNOSIS — Z01.30 BP CHECK: Primary | ICD-10-CM

## 2024-07-12 PROCEDURE — 99207 PR NO CHARGE NURSE ONLY: CPT | Performed by: FAMILY MEDICINE

## 2024-07-12 NOTE — TELEPHONE ENCOUNTER
Patient called stating that someone called him to schedule a BP check for next month  He is not sure who called.  However, he stated that last BP was a little elevated and he believes it was due to the pain meds he was on for his injured ankle   He has scheduled his BP recheck for today as he did not want to wait a month.    Unable to find any documentation regarding the outreach call patient is referring to    Lena Murcia RN  [facility]

## 2024-07-12 NOTE — PROGRESS NOTES
Adriel Elam was evaluated at [facility] on July 12, 2024 at which time his blood pressure was:    BP Readings from Last 1 Encounters:   07/12/24 136/82     No data recorded      Reviewed lifestyle modifications for blood pressure control and reduction: including making healthy food choices, managing weight, getting regular exercise, smoking cessation, reducing alcohol consumption, monitoring blood pressure regularly.     Symptoms: None    BP Goal:< 140/90 mmHg    BP Assessment:  BP at goal    Potential Reasons for BP too high: NA - Not applicable    BP Follow-Up Plan: Recheck BP in 6 months at pharmacy    Recommendation to Provider: continue current plan     Note completed by: Mariia Alexander RP

## 2024-08-15 DIAGNOSIS — I10 ESSENTIAL HYPERTENSION: ICD-10-CM

## 2024-08-15 DIAGNOSIS — M79.89 RIGHT LEG SWELLING: ICD-10-CM

## 2024-08-15 RX ORDER — CHLORTHALIDONE 25 MG/1
25 TABLET ORAL DAILY
Qty: 90 TABLET | Refills: 0 | Status: SHIPPED | OUTPATIENT
Start: 2024-08-15

## 2024-08-30 ENCOUNTER — OFFICE VISIT (OUTPATIENT)
Dept: FAMILY MEDICINE | Facility: CLINIC | Age: 64
End: 2024-08-30
Payer: COMMERCIAL

## 2024-08-30 VITALS
OXYGEN SATURATION: 98 % | BODY MASS INDEX: 28.64 KG/M2 | DIASTOLIC BLOOD PRESSURE: 83 MMHG | HEART RATE: 52 BPM | TEMPERATURE: 97.6 F | SYSTOLIC BLOOD PRESSURE: 134 MMHG | RESPIRATION RATE: 18 BRPM | HEIGHT: 74 IN | WEIGHT: 223.2 LBS

## 2024-08-30 DIAGNOSIS — L40.9 PSORIASIS: ICD-10-CM

## 2024-08-30 DIAGNOSIS — S93.402D SPRAIN OF LEFT ANKLE, UNSPECIFIED LIGAMENT, SUBSEQUENT ENCOUNTER: ICD-10-CM
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
## 2024-08-30 DIAGNOSIS — F41.1 GAD (GENERALIZED ANXIETY DISORDER): ICD-10-CM

## 2024-08-30 DIAGNOSIS — Z85.46 HISTORY OF PROSTATE CANCER: ICD-10-CM

## 2024-08-30 DIAGNOSIS — E06.3 HYPOTHYROIDISM DUE TO HASHIMOTO'S THYROIDITIS: ICD-10-CM

## 2024-08-30 DIAGNOSIS — M25.472 LEFT ANKLE SWELLING: ICD-10-CM

## 2024-08-30 DIAGNOSIS — Z00.00 ENCOUNTER FOR MEDICARE ANNUAL WELLNESS EXAM: Primary | ICD-10-CM

## 2024-08-30 LAB
CREAT SERPL-MCNC: 1.05 MG/DL (ref 0.67–1.17)
EGFRCR SERPLBLD CKD-EPI 2021: 78 ML/MIN/1.73M2
PSA SERPL DL<=0.01 NG/ML-MCNC: 0.48 NG/ML (ref 0–4.5)
TSH SERPL DL<=0.005 MIU/L-ACNC: 3.36 UIU/ML (ref 0.3–4.2)

## 2024-08-30 PROCEDURE — 84153 ASSAY OF PSA TOTAL: CPT | Performed by: FAMILY MEDICINE

## 2024-08-30 PROCEDURE — 99213 OFFICE O/P EST LOW 20 MIN: CPT | Mod: 25 | Performed by: FAMILY MEDICINE

## 2024-08-30 PROCEDURE — G0438 PPPS, INITIAL VISIT: HCPCS | Performed by: FAMILY MEDICINE

## 2024-08-30 PROCEDURE — 36415 COLL VENOUS BLD VENIPUNCTURE: CPT | Performed by: FAMILY MEDICINE

## 2024-08-30 PROCEDURE — 84443 ASSAY THYROID STIM HORMONE: CPT | Performed by: FAMILY MEDICINE

## 2024-08-30 PROCEDURE — 82565 ASSAY OF CREATININE: CPT | Performed by: FAMILY MEDICINE

## 2024-08-30 RX ORDER — IBUPROFEN 600 MG/1
600 TABLET, FILM COATED ORAL EVERY 6 HOURS PRN
Qty: 20 TABLET | Refills: 0 | Status: CANCELLED | OUTPATIENT
Start: 2024-08-30

## 2024-08-30 RX ORDER — CLOBETASOL PROPIONATE 0.5 MG/G
CREAM TOPICAL 2 TIMES DAILY
Qty: 30 G | Refills: 0 | Status: SHIPPED | OUTPATIENT
Start: 2024-08-30

## 2024-08-30 RX ORDER — LEVOTHYROXINE SODIUM 88 UG/1
88 TABLET ORAL DAILY
Qty: 90 TABLET | Refills: 0 | Status: CANCELLED | OUTPATIENT
Start: 2024-08-30

## 2024-08-30 RX ORDER — BUSPIRONE HYDROCHLORIDE 15 MG/1
15 TABLET ORAL 2 TIMES DAILY
Qty: 180 TABLET | Refills: 0 | Status: CANCELLED | OUTPATIENT
Start: 2024-08-30

## 2024-08-30 ASSESSMENT — PATIENT HEALTH QUESTIONNAIRE - PHQ9: SUM OF ALL RESPONSES TO PHQ QUESTIONS 1-9: 3

## 2024-08-30 NOTE — PATIENT INSTRUCTIONS
Patient Education   Preventive Care Advice   This is general advice given by our system to help you stay healthy. However, your care team may have specific advice just for you. Please talk to your care team about your preventive care needs.  Nutrition  Eat 5 or more servings of fruits and vegetables each day.  Try wheat bread, brown rice and whole grain pasta (instead of white bread, rice, and pasta).  Get enough calcium and vitamin D. Check the label on foods and aim for 100% of the RDA (recommended daily allowance).  Lifestyle  Exercise at least 150 minutes each week  (30 minutes a day, 5 days a week).  Do muscle strengthening activities 2 days a week. These help control your weight and prevent disease.  No smoking.  Wear sunscreen to prevent skin cancer.  Have a dental exam and cleaning every 6 months.  Yearly exams  See your health care team every year to talk about:  Any changes in your health.  Any medicines your care team has prescribed.  Preventive care, family planning, and ways to prevent chronic diseases.  Shots (vaccines)   HPV shots (up to age 26), if you've never had them before.  Hepatitis B shots (up to age 59), if you've never had them before.  COVID-19 shot: Get this shot when it's due.  Flu shot: Get a flu shot every year.  Tetanus shot: Get a tetanus shot every 10 years.  Pneumococcal, hepatitis A, and RSV shots: Ask your care team if you need these based on your risk.  Shingles shot (for age 50 and up)  General health tests  Diabetes screening:  Starting at age 35, Get screened for diabetes at least every 3 years.  If you are younger than age 35, ask your care team if you should be screened for diabetes.  Cholesterol test: At age 39, start having a cholesterol test every 5 years, or more often if advised.  Bone density scan (DEXA): At age 50, ask your care team if you should have this scan for osteoporosis (brittle bones).  Hepatitis C: Get tested at least once in your life.  STIs (sexually  transmitted infections)  Before age 24: Ask your care team if you should be screened for STIs.  After age 24: Get screened for STIs if you're at risk. You are at risk for STIs (including HIV) if:  You are sexually active with more than one person.  You don't use condoms every time.  You or a partner was diagnosed with a sexually transmitted infection.  If you are at risk for HIV, ask about PrEP medicine to prevent HIV.  Get tested for HIV at least once in your life, whether you are at risk for HIV or not.  Cancer screening tests  Cervical cancer screening: If you have a cervix, begin getting regular cervical cancer screening tests starting at age 21.  Breast cancer scan (mammogram): If you've ever had breasts, begin having regular mammograms starting at age 40. This is a scan to check for breast cancer.  Colon cancer screening: It is important to start screening for colon cancer at age 45.  Have a colonoscopy test every 10 years (or more often if you're at risk) Or, ask your provider about stool tests like a FIT test every year or Cologuard test every 3 years.  To learn more about your testing options, visit:   .  For help making a decision, visit:   https://bit.ly/tw78764.  Prostate cancer screening test: If you have a prostate, ask your care team if a prostate cancer screening test (PSA) at age 55 is right for you.  Lung cancer screening: If you are a current or former smoker ages 50 to 80, ask your care team if ongoing lung cancer screenings are right for you.  For informational purposes only. Not to replace the advice of your health care provider. Copyright   2023 [employer]. All rights reserved. Clinically reviewed by the [facility]. ExpertFile 584144 - REV 01/24.

## 2024-08-30 NOTE — PROGRESS NOTES
"Preventive Care Visit  [facility] ARNOLDO Gill MD, Family Medicine  Aug 30, 2024  {Provider  Link to SmartSet :495249}    SUBJECTIVE:   Adriel is a 64 year old, presenting for the following:  Physical (Annual )  8/30: Phys: shingrix, phq9, fall, colon (7/27)      8/30/2024     8:32 AM   Additional Questions   Roomed by adela     Are you in the first 12 months of your Medicare coverage?  No    HPI        Have you ever done Advance Care Planning? (For example, a Health Directive, POLST, or a discussion with a medical provider or your loved ones about your wishes): No, advance care planning information given to patient to review.  Patient declined advance care planning discussion at this time.    {Hearing Test Done (Optional):815224}   Fall risk  { :892232}  {If any of the above assessments are answered yes, consider ordering appropriate referrals (Optional):153541}  Cognitive Screening { :759261}    {Do you have sleep apnea, excessive snoring or daytime drowsiness? (Optional):653575}    Reviewed and updated as needed this visit by clinical staff   Tobacco  Allergies  Meds              Reviewed and updated as needed this visit by Provider                  Social History     Tobacco Use    Smoking status: Never    Smokeless tobacco: Never   Substance Use Topics    Alcohol use: Yes     Alcohol/week: 0.0 standard drinks of alcohol     Comment: Rare           No data to display            {add AUDIT responses (Optional) (A score of 7 for adult men is an indication of hazardous drinking; a score of 8 or more is an indication of an alcohol use disorder.  A score of 7 or more for adult women is an indication of hazardous drinking or an alchohol use disorder):384062}  Do you have a current opioid prescription? { :331900}  Do you use any other controlled substances or medications that are not prescribed by a provider? {Substance Use :707427::\"None\"}  {Provider  If there are gaps in the social " "history shown above, please follow the link and refresh the note Link to Social and Substance History :551672}    {Outside tests to abstract? (Optional):928137}    {additional problems to add (Optional):350308}    Current providers sharing in care for this patient include: {Rooming staff:  Please update Care Team from storyboard, refresh this note and then delete this statement}  Patient Care Team:  Luis E Gill MD as PCP - General (Family Practice)  Luis E Gill MD as Assigned PCP  Pilo Taveras OD as MD (Optometry)  Pilo Taveras OD as Assigned Surgical Provider    The following health maintenance items are reviewed in Epic and correct as of today:  Health Maintenance   Topic Date Due    ANNUAL REVIEW OF  ORDERS  Never done    ZOSTER IMMUNIZATION (1 of 2) Never done    RSV VACCINE (1 - 1-dose 60+ series) Never done    COVID-19 Vaccine (3 - 2023-24 season) 09/01/2023    PHQ-9  02/29/2024    FALL RISK ASSESSMENT  08/29/2024    INFLUENZA VACCINE (1) 09/01/2024    EYE EXAM  02/16/2025    TSH W/FREE T4 REFLEX  06/10/2025    MEDICARE ANNUAL WELLNESS VISIT  08/30/2025    GLUCOSE  06/10/2027    COLORECTAL CANCER SCREENING  07/11/2027    DTAP/TDAP/TD IMMUNIZATION (2 - Td or Tdap) 07/09/2028    ADVANCE CARE PLANNING  08/30/2028    LIPID  06/10/2029    HEPATITIS C SCREENING  Completed    DEPRESSION ACTION PLAN  Completed    Pneumococcal Vaccine: 65+ Years  Completed    HPV IMMUNIZATION  Aged Out    MENINGITIS IMMUNIZATION  Aged Out    RSV MONOCLONAL ANTIBODY  Aged Out     {Chronicprobdata (optional):990185}  {Decision Support (Optional):381364}      Review of Systems   {ROS Picklists (Optional):340958}    OBJECTIVE:   /83   Pulse 52   Temp 97.6  F (36.4  C) (Temporal)   Resp 18   Ht 1.87 m (6' 1.62\")   Wt 101.2 kg (223 lb 3.2 oz)   SpO2 98%   BMI 28.95 kg/m     Estimated body mass index is 28.95 kg/m  as calculated from the following:    Height as of this encounter: 1.87 m (6' " "1.62\").    Weight as of this encounter: 101.2 kg (223 lb 3.2 oz).  Physical Exam  {Exam List (Optional):438381}    {Diagnostic Test Results (Optional):264416}    ASSESSMENT / PLAN:   {Diag Picklist:808246}    {Patient advised of split billing (Optional):521492}      Counseling  {Medicare Counselin}        He reports that he has never smoked. He has never used smokeless tobacco.      Appropriate preventive services were discussed with this patient, including applicable screening as appropriate for fall prevention, nutrition, physical activity, Tobacco-use cessation, weight loss and cognition.  Checklist reviewing preventive services available has been given to the patient.    Reviewed patients plan of care and provided an AVS. The {CarePlan:967590} for Adriel meets the Care Plan requirement. This Care Plan has been established and reviewed with the {PATIENT, FAMILY MEMBER, CAREGIVER:099124}.    {Counseling Resources  [place] Preventive Services Task Force  Cholesterol Screening  Health diet/nutrition  Pooled Cohorts Equation Calculator  USDA's MyPlate  ASA Prophylaxis  Lung CA Screening  Osteoporosis prevention/bone health :311676}  {Prostate Cancer Screening  Consider for men 55-69 per guidance from USPSTF :721683}    Signed Electronically by: Luis E Gill MD    Identified Health Risks  {Medicare required documentation of substance and opioid use disorders screening :069408}  "

## 2024-09-10 DIAGNOSIS — E06.3 HYPOTHYROIDISM DUE TO HASHIMOTO'S THYROIDITIS: ICD-10-CM

## 2024-09-10 RX ORDER — LEVOTHYROXINE SODIUM 88 UG/1
88 TABLET ORAL DAILY
Qty: 90 TABLET | Refills: 0 | Status: SHIPPED | OUTPATIENT
Start: 2024-09-10

## 2024-09-12 ENCOUNTER — TELEPHONE (OUTPATIENT)
Dept: FAMILY MEDICINE | Facility: CLINIC | Age: 64
End: 2024-09-12
Payer: COMMERCIAL

## 2024-09-12 DIAGNOSIS — S93.402D SPRAIN OF LEFT ANKLE, UNSPECIFIED LIGAMENT, SUBSEQUENT ENCOUNTER: ICD-10-CM
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
## 2024-09-12 RX ORDER — IBUPROFEN 600 MG/1
600 TABLET, FILM COATED ORAL EVERY 6 HOURS PRN
Qty: 30 TABLET | Refills: 0 | Status: SHIPPED | OUTPATIENT
Start: 2024-09-12

## 2024-09-12 NOTE — TELEPHONE ENCOUNTER
Been doing ibuprofen as needed and has not taken for too long to worry and recent creatinine check was normal, refill sent to the pharmacy

## 2024-09-12 NOTE — TELEPHONE ENCOUNTER
Called patient and left a detailed VM notifying him that prescription has been sent.     Cleo Gilmore RN

## 2024-09-12 NOTE — TELEPHONE ENCOUNTER
Received call from patient. He was in 8/30/24 to see PCP and addressed some residual L foot pain from an injury back in May. Patient was taking ibuprofen (ADVIL/MOTRIN) 600 MG tablet. This was working for him but he has since run out. He is wondering if he can get a refill for this or an alternate medication. He is worried about long term use of Ibuprofen and is wondering if he should be taking something different.    Pharmacy:   [facility] - [place], [place] - [address]    KARISHMA YarbroughN RN  [facility]

## 2024-11-08 DIAGNOSIS — M79.89 RIGHT LEG SWELLING: ICD-10-CM

## 2024-11-08 DIAGNOSIS — I10 ESSENTIAL HYPERTENSION: ICD-10-CM

## 2024-11-08 RX ORDER — CHLORTHALIDONE 25 MG/1
25 TABLET ORAL DAILY
Qty: 90 TABLET | Refills: 2 | Status: SHIPPED | OUTPATIENT
Start: 2024-11-08

## 2024-12-08 DIAGNOSIS — E06.3 HYPOTHYROIDISM DUE TO HASHIMOTO'S THYROIDITIS: ICD-10-CM

## 2024-12-09 RX ORDER — LEVOTHYROXINE SODIUM 88 UG/1
88 TABLET ORAL DAILY
Qty: 90 TABLET | Refills: 1 | Status: SHIPPED | OUTPATIENT
Start: 2024-12-09

## 2025-01-06 ENCOUNTER — OFFICE VISIT (OUTPATIENT)
Dept: FAMILY MEDICINE | Facility: CLINIC | Age: 65
End: 2025-01-06
Payer: COMMERCIAL

## 2025-01-06 VITALS
OXYGEN SATURATION: 97 % | SYSTOLIC BLOOD PRESSURE: 138 MMHG | BODY MASS INDEX: 29.45 KG/M2 | WEIGHT: 227.05 LBS | DIASTOLIC BLOOD PRESSURE: 80 MMHG | TEMPERATURE: 98.3 F | RESPIRATION RATE: 20 BRPM | HEART RATE: 52 BPM

## 2025-01-06 DIAGNOSIS — M25.472 LEFT ANKLE SWELLING: ICD-10-CM

## 2025-01-06 DIAGNOSIS — R53.83 OTHER FATIGUE: ICD-10-CM

## 2025-01-06 DIAGNOSIS — Z82.49 FAMILY HISTORY OF HEART FAILURE: Primary | ICD-10-CM

## 2025-01-06 DIAGNOSIS — E06.3 HYPOTHYROIDISM DUE TO HASHIMOTO'S THYROIDITIS: ICD-10-CM

## 2025-01-06 DIAGNOSIS — B35.1 ONYCHOMYCOSIS OF RIGHT GREAT TOE: ICD-10-CM

## 2025-01-06 LAB
ALBUMIN SERPL BCG-MCNC: 4.1 G/DL (ref 3.5–5.2)
ALBUMIN UR-MCNC: NEGATIVE MG/DL
ALP SERPL-CCNC: 104 U/L (ref 40–150)
ALT SERPL W P-5'-P-CCNC: 19 U/L (ref 0–70)
ANION GAP SERPL CALCULATED.3IONS-SCNC: 6 MMOL/L (ref 7–15)
APPEARANCE UR: CLEAR
AST SERPL W P-5'-P-CCNC: 26 U/L (ref 0–45)
BILIRUB SERPL-MCNC: 0.9 MG/DL
BILIRUB UR QL STRIP: NEGATIVE
BUN SERPL-MCNC: 19.2 MG/DL (ref 8–23)
CALCIUM SERPL-MCNC: 9.3 MG/DL (ref 8.8–10.4)
CHLORIDE SERPL-SCNC: 104 MMOL/L (ref 98–107)
COLOR UR AUTO: YELLOW
CREAT SERPL-MCNC: 1.06 MG/DL (ref 0.67–1.17)
EGFRCR SERPLBLD CKD-EPI 2021: 77 ML/MIN/1.73M2
ERYTHROCYTE [DISTWIDTH] IN BLOOD BY AUTOMATED COUNT: 12.1 % (ref 10–15)
GLUCOSE SERPL-MCNC: 94 MG/DL (ref 70–99)
GLUCOSE UR STRIP-MCNC: NEGATIVE MG/DL
HCO3 SERPL-SCNC: 29 MMOL/L (ref 22–29)
HCT VFR BLD AUTO: 44.2 % (ref 40–53)
HGB BLD-MCNC: 16.1 G/DL (ref 13.3–17.7)
HGB UR QL STRIP: NEGATIVE
KETONES UR STRIP-MCNC: NEGATIVE MG/DL
LEUKOCYTE ESTERASE UR QL STRIP: NEGATIVE
MCH RBC QN AUTO: 30.8 PG (ref 26.5–33)
MCHC RBC AUTO-ENTMCNC: 36.4 G/DL (ref 31.5–36.5)
MCV RBC AUTO: 85 FL (ref 78–100)
NITRATE UR QL: NEGATIVE
PH UR STRIP: 6 [PH] (ref 5–7)
PLATELET # BLD AUTO: 139 10E3/UL (ref 150–450)
POTASSIUM SERPL-SCNC: 4.2 MMOL/L (ref 3.4–5.3)
PROT SERPL-MCNC: 7.2 G/DL (ref 6.4–8.3)
RBC # BLD AUTO: 5.23 10E6/UL (ref 4.4–5.9)
SODIUM SERPL-SCNC: 139 MMOL/L (ref 135–145)
SP GR UR STRIP: >=1.03 (ref 1–1.03)
TSH SERPL DL<=0.005 MIU/L-ACNC: 3.46 UIU/ML (ref 0.3–4.2)
UROBILINOGEN UR STRIP-ACNC: 0.2 E.U./DL
WBC # BLD AUTO: 4.8 10E3/UL (ref 4–11)

## 2025-01-06 PROCEDURE — 85027 COMPLETE CBC AUTOMATED: CPT | Performed by: NURSE PRACTITIONER

## 2025-01-06 PROCEDURE — 80053 COMPREHEN METABOLIC PANEL: CPT | Performed by: NURSE PRACTITIONER

## 2025-01-06 PROCEDURE — 84443 ASSAY THYROID STIM HORMONE: CPT | Performed by: NURSE PRACTITIONER

## 2025-01-06 PROCEDURE — 36415 COLL VENOUS BLD VENIPUNCTURE: CPT | Performed by: NURSE PRACTITIONER

## 2025-01-06 PROCEDURE — 81003 URINALYSIS AUTO W/O SCOPE: CPT | Performed by: NURSE PRACTITIONER

## 2025-01-06 PROCEDURE — 99214 OFFICE O/P EST MOD 30 MIN: CPT | Performed by: NURSE PRACTITIONER

## 2025-01-06 RX ORDER — CICLOPIROX 80 MG/ML
SOLUTION TOPICAL DAILY
Qty: 6 ML | Refills: 0 | Status: SHIPPED | OUTPATIENT
Start: 2025-01-06

## 2025-01-06 ASSESSMENT — ENCOUNTER SYMPTOMS
SHORTNESS OF BREATH: 0
ACTIVITY CHANGE: 0
CHILLS: 0
FATIGUE: 1
FEVER: 0

## 2025-01-06 ASSESSMENT — PAIN SCALES - GENERAL: PAINLEVEL_OUTOF10: NO PAIN (1)

## 2025-01-06 NOTE — PROGRESS NOTES
Assessment & Plan     1. Onychomycosis of right great toe  He endorses social ETOH use, in light of this, avoid oral antifungal and start topical.  - ciclopirox (PENLAC) 8 % external solution; Apply topically daily. Apply to adjacent skin and affected nails daily.  Remove with alcohol every 7 days, then repeat.  Dispense: 6 mL; Refill: 0    2. Family history of heart failure (Primary)  He endorses chronic fatigue but no activity intolerance. Lung sounds clear and no edema today. Bradycardia c/w prior exams.   Will have him continue compression stockings (he can try 20-mmhg) for L ankle swelling. If symptoms worsen, obtain echo.    3. Left ankle swelling  Symptoms likely 2/2 venous insufficiency. Use compression stockings.   - UA Macroscopic with reflex to Microscopic and Culture; Future  - TSH with free T4 reflex; Future    4. Other fatigue  As noted above. Start with labs. Obtain ECHO if symptoms worsen.  - CBC with platelets; Future  - TSH with free T4 reflex; Future  - Comprehensive metabolic panel (BMP + Alb, Alk Phos, ALT, AST, Total. Bili, TP); Future  - UA Macroscopic with reflex to Microscopic and Culture; Future    5. Hypothyroidism due to Hashimoto's thyroiditis    - TSH with free T4 reflex; Future    Follow-up if symptoms worsen/fail to improve. Disposition pending results.      All questions/concerns addressed. Patient stated understanding/agreement to plan of care.        Note: Chart documentation was done in part with Dragon Voice Recognition software.  Although reviewed after completion, some word and grammatical errors may remain. Please contact author for any clarification or concerns.      Manisha Morel is a 64 year old, presenting for the following health issues:  Musculoskeletal Problem (/)      1/6/2025     8:57 AM   Additional Questions   Roomed by Natalie   Accompanied by self         1/6/2025     8:57 AM   Patient Reported Additional Medications   Patient reports taking the following new  "medications none     History of Present Illness       Reason for visit:  Swollen ankle and darkening toe nail  Symptom onset:  More than a month  Symptoms include:  Swollen ankle and darkening nail  Symptom intensity:  Moderate  Symptom progression:  Worsening  Had these symptoms before:  Yes  What makes it better:  Ibuprofen   He is taking medications regularly.       Fell off a ladder in June. Had x-rays completed. Took ibuprofen for pain, no longer using.  Has had chronic Left ankle swelling. Sometimes uses compression stockings which work (he is unsure of which strength) or he will wrap in ace bandage.   He endorses chronic fatigue(\"for years\") but no activity intolerance. No SOB or CP.      Has R big toe discoloration. Hurts intermittently. No other acute concerns/symptoms at time of exam.                Review of Systems   Constitutional:  Positive for fatigue. Negative for activity change, chills and fever.   Respiratory:  Negative for shortness of breath.    Cardiovascular:  Negative for chest pain.   Constitutional, HEENT, cardiovascular, pulmonary, gi and gu systems are negative, except as otherwise noted.        Current Outpatient Medications   Medication Sig Dispense Refill    Acetaminophen (TYLENOL PO) Take 325 mg by mouth every 6 hours as needed for mild pain or fever      augmented betamethasone dipropionate (DIPROLENE-AF) 0.05 % external cream Apply topically 2 times daily (to affected sites) 50 g 0    busPIRone (BUSPAR) 15 MG tablet Take 1 tablet (15 mg) by mouth 2 times daily 180 tablet 0    chlorthalidone (HYGROTON) 25 MG tablet Take 1 tablet by mouth once daily 90 tablet 2    clobetasol propionate (TEMOVATE) 0.05 % external cream Apply topically 2 times daily. (Apply to skin patches; Lt gluteal fold and elbows for up to 14 days continuously) 30 g 0    ibuprofen (ADVIL/MOTRIN) 600 MG tablet Take 1 tablet (600 mg) by mouth every 6 hours as needed for moderate pain or inflammatory pain. 30 tablet 0 "    levothyroxine (SYNTHROID/LEVOTHROID) 88 MCG tablet Take 1 tablet by mouth once daily 90 tablet 1    ciclopirox (PENLAC) 8 % external solution Apply topically daily Apply to adjacent skin and affected nails daily.  Remove with alcohol every 7 days, then repeat. (Patient not taking: Reported on 1/6/2025) 6 mL 0     No current facility-administered medications for this visit.       Past Medical History:   Diagnosis Date    Hypothyroidism        No past surgical history on file.    Family History   Problem Relation Age of Onset    Cancer No family hx of     Diabetes No family hx of     Hypertension No family hx of     Cerebrovascular Disease No family hx of     Macular Degeneration No family hx of     Glaucoma No family hx of     Thyroid Disease No family hx of        Social History     Tobacco Use    Smoking status: Never     Passive exposure: Never    Smokeless tobacco: Never   Substance Use Topics    Alcohol use: Yes     Alcohol/week: 0.0 standard drinks of alcohol     Comment: Rare              Objective        /80 (BP Location: Right arm, Patient Position: Sitting, Cuff Size: Adult Regular)   Pulse 52   Temp 98.3  F (36.8  C) (Temporal)   Resp 20   Wt 103 kg (227 lb 0.8 oz)   SpO2 97%   BMI 29.45 kg/m        Physical Exam  Constitutional:       General: He is not in acute distress.     Appearance: He is not ill-appearing.   Cardiovascular:      Rate and Rhythm: Regular rhythm. Bradycardia present.      Pulses:           Dorsalis pedis pulses are 2+ on the right side and 2+ on the left side.   Pulmonary:      Effort: Pulmonary effort is normal. No respiratory distress.      Breath sounds: Normal breath sounds. No wheezing or rhonchi.   Musculoskeletal:      Cervical back: Neck supple.      Right lower leg: No edema.      Left lower leg: No edema.      Comments: No calf squeeze tenderness noted bilaterally. No calf swelling or erythema bilaterally.   Feet:      Right foot:      Toenail Condition:  Fungal disease present.     Comments: R great toe with discoloration/thickening c/w fungal disease. No tenderness on palpation.    No edema. Marginal swelling to L ankle. No tenderness.  Skin:     Capillary Refill: Capillary refill takes less than 2 seconds.      Comments: Brown discoloration c/w venous insufficiency to LLE . Varicose veins noted.   Neurological:      General: No focal deficit present.      Mental Status: He is alert.   Psychiatric:         Thought Content: Thought content normal.         Judgment: Judgment normal.                Signed Electronically by: ERIBERTO Thayer CNP

## 2025-01-06 NOTE — LETTER
January 7, 2025      Adriel Elam  [address]  [place] [address]        Dear ,    We are writing to inform you of your test results.    all labs stable. Follow-up with any concerns/worsening or persistent symptoms.     Resulted Orders   CBC with platelets   Result Value Ref Range    WBC Count 4.8 4.0 - 11.0 10e3/uL    RBC Count 5.23 4.40 - 5.90 10e6/uL    Hemoglobin 16.1 13.3 - 17.7 g/dL    Hematocrit 44.2 40.0 - 53.0 %    MCV 85 78 - 100 fL    MCH 30.8 26.5 - 33.0 pg    MCHC 36.4 31.5 - 36.5 g/dL    RDW 12.1 10.0 - 15.0 %    Platelet Count 139 (L) 150 - 450 10e3/uL   TSH with free T4 reflex   Result Value Ref Range    TSH 3.46 0.30 - 4.20 uIU/mL   Comprehensive metabolic panel (BMP + Alb, Alk Phos, ALT, AST, Total. Bili, TP)   Result Value Ref Range    Sodium 139 135 - 145 mmol/L    Potassium 4.2 3.4 - 5.3 mmol/L    Carbon Dioxide (CO2) 29 22 - 29 mmol/L    Anion Gap 6 (L) 7 - 15 mmol/L    Urea Nitrogen 19.2 8.0 - 23.0 mg/dL    Creatinine 1.06 0.67 - 1.17 mg/dL    GFR Estimate 77 >60 mL/min/1.73m2      Comment:      eGFR calculated using 2021 CKD-EPI equation.    Calcium 9.3 8.8 - 10.4 mg/dL      Comment:      Reference intervals for this test were updated on 7/16/2024 to reflect our healthy population more accurately. There may be differences in the flagging of prior results with similar values performed with this method. Those prior results can be interpreted in the context of the updated reference intervals.    Chloride 104 98 - 107 mmol/L    Glucose 94 70 - 99 mg/dL    Alkaline Phosphatase 104 40 - 150 U/L    AST 26 0 - 45 U/L    ALT 19 0 - 70 U/L    Protein Total 7.2 6.4 - 8.3 g/dL    Albumin 4.1 3.5 - 5.2 g/dL    Bilirubin Total 0.9 <=1.2 mg/dL   UA Macroscopic with reflex to Microscopic and Culture   Result Value Ref Range    Color Urine Yellow Colorless, Straw, Light Yellow, Yellow    Appearance Urine Clear Clear    Glucose Urine Negative Negative mg/dL    Bilirubin Urine Negative  Negative    Ketones Urine Negative Negative mg/dL    Specific Gravity Urine >=1.030 1.003 - 1.035    Blood Urine Negative Negative    pH Urine 6.0 5.0 - 7.0    Protein Albumin Urine Negative Negative mg/dL    Urobilinogen Urine 0.2 0.2, 1.0 E.U./dL    Nitrite Urine Negative Negative    Leukocyte Esterase Urine Negative Negative    Narrative    Microscopic not indicated       If you have any questions or concerns, please call the clinic at the number listed above.       Sincerely,      ERIBERTO Menendez CNP    Electronically signed

## 2025-06-03 ENCOUNTER — TELEPHONE (OUTPATIENT)
Dept: FAMILY MEDICINE | Facility: CLINIC | Age: 65
End: 2025-06-03
Payer: COMMERCIAL

## 2025-06-03 NOTE — TELEPHONE ENCOUNTER
Pt saw pcp 1 year ago for sprained ankle. Feels good now, but not all the way healed like pt thought it would be. Prescribed ibuprofen 600mg. Can do everything, but run on it. Still gets some swelling if he doesn't wrap his ankle. Pt seeing if theres any other pain medications or advice for his ankle.     Nurse educated that for chronic pain medication there is never a perfect choice as there are drawbacks to all of the medications. Informed pt to elevate his ankle when at rest, apply heat packs, try roll on OTC creams to help alleviate pain.    Scheduled with pcp on 6/10 for further eval.    Nicole Joshi RN on 6/3/2025 at 1:52 PM

## 2025-06-10 ENCOUNTER — OFFICE VISIT (OUTPATIENT)
Dept: FAMILY MEDICINE | Facility: CLINIC | Age: 65
End: 2025-06-10
Payer: COMMERCIAL

## 2025-06-10 VITALS
DIASTOLIC BLOOD PRESSURE: 80 MMHG | WEIGHT: 228 LBS | OXYGEN SATURATION: 98 % | HEIGHT: 74 IN | BODY MASS INDEX: 29.26 KG/M2 | HEART RATE: 57 BPM | SYSTOLIC BLOOD PRESSURE: 138 MMHG | RESPIRATION RATE: 20 BRPM | TEMPERATURE: 97.5 F

## 2025-06-10 DIAGNOSIS — Z87.828 HISTORY OF ANKLE SPRAIN: ICD-10-CM

## 2025-06-10 DIAGNOSIS — M25.572 PAIN IN JOINT, ANKLE AND FOOT, LEFT: Primary | ICD-10-CM

## 2025-06-10 DIAGNOSIS — B35.1 ONYCHOMYCOSIS: ICD-10-CM

## 2025-06-10 PROCEDURE — 99214 OFFICE O/P EST MOD 30 MIN: CPT | Performed by: FAMILY MEDICINE

## 2025-06-10 PROCEDURE — 3079F DIAST BP 80-89 MM HG: CPT | Performed by: FAMILY MEDICINE

## 2025-06-10 PROCEDURE — 3075F SYST BP GE 130 - 139MM HG: CPT | Performed by: FAMILY MEDICINE

## 2025-06-10 RX ORDER — TERBINAFINE HYDROCHLORIDE 250 MG/1
250 TABLET ORAL DAILY
Qty: 90 TABLET | Refills: 0 | Status: SHIPPED | OUTPATIENT
Start: 2025-06-10 | End: 2025-09-08

## 2025-06-10 ASSESSMENT — PATIENT HEALTH QUESTIONNAIRE - PHQ9
SUM OF ALL RESPONSES TO PHQ QUESTIONS 1-9: 1
10. IF YOU CHECKED OFF ANY PROBLEMS, HOW DIFFICULT HAVE THESE PROBLEMS MADE IT FOR YOU TO DO YOUR WORK, TAKE CARE OF THINGS AT HOME, OR GET ALONG WITH OTHER PEOPLE: NOT DIFFICULT AT ALL
SUM OF ALL RESPONSES TO PHQ QUESTIONS 1-9: 1

## 2025-06-10 NOTE — PROGRESS NOTES
Pt saw pcp 1 year ago for sprained ankle. Feels good now, but not all the way healed like pt thought it would be. Prescribed ibuprofen 600mg. Can do everything, but run on it. Still gets some swelling if he doesn't wrap his ankle. Pt seeing if theres any other pain medications or advice for his ankle.   ANKLE LEFT THREE OR MORE VIEWS   6/10/2024 3:59 PM      HISTORY:  Fall, subsequent encounter. Sprain of left ankle,  unspecified ligament, subsequent encounter.     COMPARISON: None.                                                                      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 Soft tissue swelling about the ankle. There is no  evidence of an acute fracture. No talar dome osteochondral lesion. The  ankle mortise is symmetric. Small Achilles enthesophyte. Otherwise  unremarkable.

## 2025-06-10 NOTE — PROGRESS NOTES
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  The tenderness is mostly on the anterolateral aspect.  Given the persistence of the swelling discussed evaluation with an MRI, will confirm whether covered by his insurance prior to scheduling  - MR Ankle Left w/o Contrast    History of ankle sprain  This occurred about a year ago, continues to have pain especially some movements  - MR Ankle Left w/o Contrast    Onychomycosis  He has been using ciclopirox for a long time but this is persisting.  Discussed using an oral medication, also reviewed side effect especially related the liver.  Recent liver function was normal.  Will start the Lamisil and recheck liver function test in 1 month.  - terbinafine (LAMISIL) 250 MG tablet  Dispense: 90 tablet; Refill: 0    BMI  Estimated body mass index is 29.57 kg/m  as calculated from the following:    Height as of this encounter: 1.87 m (6' 1.62\").    Weight as of this encounter: 103.4 kg (228 lb).   Weight management plan: Discussed healthy diet and exercise guidelines    Follow-up       Subjective   Adriel is a 65 year old, presenting for the following health issues:  Ankle Pain (Left ankle )    HPI  Hurts on the lateral aspect; with twisting motions and not able to run on it  At end of day also sore   Wraps with bandage and does not swell but without bandage swells a little.  Ibuprofen 600 mg as needed helps      Pt saw pcp 1 year ago for sprained ankle. Feels good now, but not all the way healed like pt thought it would be. Prescribed ibuprofen 600mg. Can do everything, but run on it. Still gets some swelling if he doesn't wrap his ankle. Pt seeing if theres any other pain medications or advice for his ankle.     ANKLE LEFT THREE OR MORE VIEWS   6/10/2024 3:59 PM      HISTORY:  Fall, " "subsequent encounter. Sprain of left ankle,  unspecified ligament, subsequent encounter.                                                             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 Soft tissue swelling about the ankle. There is no  evidence of an acute fracture. No talar dome osteochondral lesion. The  ankle mortise is symmetric. Small Achilles enthesophyte. Otherwise  unremarkable.       6/10/2025     4:08 PM   Additional Questions   Roomed by Virginie     Ankle Pain  The current episode started more than 1 year ago. The problem occurs every several days. The symptoms are aggravated by walking, twisting and standing. He has tried lying down, heat and rest for the symptoms.   History of Present Illness       Reason for visit:  Ankle    He eats 2-3 servings of fruits and vegetables daily.He consumes 0 sweetened beverage(s) daily.He exercises with enough effort to increase his heart rate 10 to 19 minutes per day.  He exercises with enough effort to increase his heart rate 3 or less days per week.   He is taking medications regularly.      Review of Systems  Constitutional, HEENT, cardiovascular, pulmonary, gi and gu systems are negative, except as otherwise noted.      Objective    BP (!) 149/88   Pulse 57   Temp 97.5  F (36.4  C) (Temporal)   Resp 20   Ht 1.87 m (6' 1.62\")   Wt 103.4 kg (228 lb)   SpO2 98%   BMI 29.57 kg/m    Body mass index is 29.57 kg/m .  Physical Exam   GENERAL: alert and no distress  NECK: no adenopathy, no asymmetry, masses, or scars  RESP: lungs clear to auscultation - no rales, rhonchi or wheezes  CV: regular rate and rhythm, no murmur, click or rub, no peripheral edema  MS: Lt Ankle: tenderness over anterior lateral aspect of ankle. Great toe nails with discoloration and brittleness.        Signed Electronically by: Luis E Gill MD    "

## 2025-06-24 ENCOUNTER — ANCILLARY PROCEDURE (OUTPATIENT)
Dept: MRI IMAGING | Facility: CLINIC | Age: 65
End: 2025-06-24
Attending: FAMILY MEDICINE
Payer: COMMERCIAL

## 2025-06-24 DIAGNOSIS — M25.572 PAIN IN JOINT, ANKLE AND FOOT, LEFT: ICD-10-CM

## 2025-06-24 DIAGNOSIS — Z87.828 HISTORY OF ANKLE SPRAIN: ICD-10-CM

## 2025-06-24 PROCEDURE — 73721 MRI JNT OF LWR EXTRE W/O DYE: CPT | Mod: TC | Performed by: RADIOLOGY

## 2025-06-25 ENCOUNTER — RESULTS FOLLOW-UP (OUTPATIENT)
Dept: FAMILY MEDICINE | Facility: CLINIC | Age: 65
End: 2025-06-25

## 2025-07-07 ENCOUNTER — TELEPHONE (OUTPATIENT)
Dept: FAMILY MEDICINE | Facility: CLINIC | Age: 65
End: 2025-07-07
Payer: COMMERCIAL

## 2025-07-07 DIAGNOSIS — B35.1 ONYCHOMYCOSIS: Primary | ICD-10-CM

## 2025-07-07 NOTE — TELEPHONE ENCOUNTER
Patient calling      He was placed on Lamisil and advised to follow up in 30 days for liver function test. Reviewed last notes, placed lab order and assisted with scheduling pt on lab schedule.    6/10  Onychomycosis  He has been using ciclopirox for a long time but this is persisting.  Discussed using an oral medication, also reviewed side effect especially related the liver.  Recent liver function was normal.  Will start the Lamisil and recheck liver function test in 1 month.  Luis E Gill MD  Family Medicine          LEONELA Banks    Triage Nurse  [facility]

## 2025-07-08 ENCOUNTER — LAB (OUTPATIENT)
Dept: LAB | Facility: CLINIC | Age: 65
End: 2025-07-08
Payer: COMMERCIAL

## 2025-07-08 DIAGNOSIS — B35.1 ONYCHOMYCOSIS: ICD-10-CM

## 2025-07-08 PROCEDURE — 80076 HEPATIC FUNCTION PANEL: CPT

## 2025-07-08 PROCEDURE — 36415 COLL VENOUS BLD VENIPUNCTURE: CPT

## 2025-07-09 LAB
ALBUMIN SERPL BCG-MCNC: 4.2 G/DL (ref 3.5–5.2)
ALP SERPL-CCNC: 98 U/L (ref 40–150)
ALT SERPL W P-5'-P-CCNC: 16 U/L (ref 0–70)
AST SERPL W P-5'-P-CCNC: 23 U/L (ref 0–45)
BILIRUB SERPL-MCNC: 0.5 MG/DL
BILIRUBIN DIRECT (ROCHE PRO & PURE): 0.23 MG/DL (ref 0–0.45)
PROT SERPL-MCNC: 7 G/DL (ref 6.4–8.3)

## 2025-07-17 ENCOUNTER — TELEPHONE (OUTPATIENT)
Dept: FAMILY MEDICINE | Facility: CLINIC | Age: 65
End: 2025-07-17
Payer: COMMERCIAL

## 2025-07-17 NOTE — TELEPHONE ENCOUNTER
Patient only grabbed 30 day supply of Lamisil in case his liver labs were abnormal. Liver labs are normal, pt wondering if he should continue Lamisil?    He states the fungal infection is improving, but not all the way gone.    Nicole Joshi RN on 7/17/2025 at 8:47 AM

## 2025-07-17 NOTE — TELEPHONE ENCOUNTER
Pt states that when he originally picked up his Lamisil he had only picked up a quaintly of 30 (instead of the full 90). Pt wanted RN to call [facility] pharmacy to see if they could still fill rest of prescription. RN called [facility] pharmacy, they are able to fill the rest of the prescription. Called pt back to update, pt verbalized understanding.     Claudette Taveras RN

## 2025-07-31 ENCOUNTER — PATIENT OUTREACH (OUTPATIENT)
Dept: CARE COORDINATION | Facility: CLINIC | Age: 65
End: 2025-07-31
Payer: COMMERCIAL

## 2025-08-13 DIAGNOSIS — M79.89 RIGHT LEG SWELLING: ICD-10-CM

## 2025-08-13 DIAGNOSIS — I10 ESSENTIAL HYPERTENSION: ICD-10-CM

## 2025-08-13 RX ORDER — CHLORTHALIDONE 25 MG/1
25 TABLET ORAL DAILY
Qty: 90 TABLET | Refills: 2 | Status: SHIPPED | OUTPATIENT
Start: 2025-08-13

## 2025-09-03 ENCOUNTER — OFFICE VISIT (OUTPATIENT)
Dept: FAMILY MEDICINE | Facility: CLINIC | Age: 65
End: 2025-09-03
Payer: COMMERCIAL

## 2025-09-03 VITALS
HEART RATE: 54 BPM | WEIGHT: 228.8 LBS | RESPIRATION RATE: 20 BRPM | TEMPERATURE: 97.2 F | SYSTOLIC BLOOD PRESSURE: 130 MMHG | DIASTOLIC BLOOD PRESSURE: 82 MMHG | OXYGEN SATURATION: 95 % | BODY MASS INDEX: 30.32 KG/M2 | HEIGHT: 73 IN

## 2025-09-03 DIAGNOSIS — M79.89 RIGHT LEG SWELLING: ICD-10-CM

## 2025-09-03 DIAGNOSIS — F43.29 STRESS AND ADJUSTMENT REACTION: ICD-10-CM

## 2025-09-03 DIAGNOSIS — I10 HTN, GOAL BELOW 140/90: ICD-10-CM

## 2025-09-03 DIAGNOSIS — R21 RASH: ICD-10-CM

## 2025-09-03 DIAGNOSIS — Z23 NEED FOR VACCINATION: ICD-10-CM

## 2025-09-03 DIAGNOSIS — L40.9 PSORIASIS: ICD-10-CM

## 2025-09-03 DIAGNOSIS — Z12.5 SCREENING FOR PROSTATE CANCER: ICD-10-CM

## 2025-09-03 DIAGNOSIS — Z13.1 SCREENING FOR DIABETES MELLITUS: ICD-10-CM

## 2025-09-03 DIAGNOSIS — Z00.00 ENCOUNTER FOR MEDICARE ANNUAL WELLNESS EXAM: Primary | ICD-10-CM

## 2025-09-03 DIAGNOSIS — F41.1 GAD (GENERALIZED ANXIETY DISORDER): ICD-10-CM

## 2025-09-03 DIAGNOSIS — E06.3 HYPOTHYROIDISM DUE TO HASHIMOTO'S THYROIDITIS: ICD-10-CM

## 2025-09-03 LAB
ALBUMIN SERPL BCG-MCNC: 4.1 G/DL (ref 3.5–5.2)
ALP SERPL-CCNC: 98 U/L (ref 40–150)
ALT SERPL W P-5'-P-CCNC: 15 U/L (ref 0–70)
ANION GAP SERPL CALCULATED.3IONS-SCNC: 9 MMOL/L (ref 7–15)
AST SERPL W P-5'-P-CCNC: 21 U/L (ref 0–45)
BILIRUB SERPL-MCNC: 0.8 MG/DL
BUN SERPL-MCNC: 21.5 MG/DL (ref 8–23)
CALCIUM SERPL-MCNC: 9.5 MG/DL (ref 8.8–10.4)
CHLORIDE SERPL-SCNC: 103 MMOL/L (ref 98–107)
CHOLEST SERPL-MCNC: 147 MG/DL
CREAT SERPL-MCNC: 1.17 MG/DL (ref 0.67–1.17)
EGFRCR SERPLBLD CKD-EPI 2021: 68 ML/MIN/1.73M2
EST. AVERAGE GLUCOSE BLD GHB EST-MCNC: 111 MG/DL
FASTING STATUS PATIENT QL REPORTED: NO
FASTING STATUS PATIENT QL REPORTED: NO
GLUCOSE SERPL-MCNC: 95 MG/DL (ref 70–99)
HBA1C MFR BLD: 5.5 % (ref 0–5.6)
HCO3 SERPL-SCNC: 28 MMOL/L (ref 22–29)
HDLC SERPL-MCNC: 43 MG/DL
LDLC SERPL CALC-MCNC: 78 MG/DL
NONHDLC SERPL-MCNC: 104 MG/DL
POTASSIUM SERPL-SCNC: 4.3 MMOL/L (ref 3.4–5.3)
PROT SERPL-MCNC: 7 G/DL (ref 6.4–8.3)
PSA SERPL DL<=0.01 NG/ML-MCNC: 0.52 NG/ML (ref 0–4.5)
SODIUM SERPL-SCNC: 140 MMOL/L (ref 135–145)
TRIGL SERPL-MCNC: 128 MG/DL

## 2025-09-03 PROCEDURE — 83036 HEMOGLOBIN GLYCOSYLATED A1C: CPT | Performed by: FAMILY MEDICINE

## 2025-09-03 PROCEDURE — G0103 PSA SCREENING: HCPCS | Performed by: FAMILY MEDICINE

## 2025-09-03 PROCEDURE — 80053 COMPREHEN METABOLIC PANEL: CPT | Performed by: FAMILY MEDICINE

## 2025-09-03 PROCEDURE — 80061 LIPID PANEL: CPT | Performed by: FAMILY MEDICINE

## 2025-09-03 PROCEDURE — 36415 COLL VENOUS BLD VENIPUNCTURE: CPT | Performed by: FAMILY MEDICINE

## 2025-09-03 RX ORDER — BETAMETHASONE DIPROPIONATE 0.5 MG/G
CREAM TOPICAL 2 TIMES DAILY
Qty: 50 G | Refills: 0 | Status: SHIPPED | OUTPATIENT
Start: 2025-09-03

## 2025-09-03 RX ORDER — CHLORTHALIDONE 25 MG/1
25 TABLET ORAL DAILY
Qty: 90 TABLET | Refills: 1 | Status: SHIPPED | OUTPATIENT
Start: 2025-09-03

## 2025-09-03 RX ORDER — LEVOTHYROXINE SODIUM 88 UG/1
88 TABLET ORAL DAILY
Qty: 90 TABLET | Refills: 1 | Status: SHIPPED | OUTPATIENT
Start: 2025-09-03

## 2025-09-03 RX ORDER — BUSPIRONE HYDROCHLORIDE 15 MG/1
15 TABLET ORAL 2 TIMES DAILY
Qty: 180 TABLET | Refills: 0 | Status: CANCELLED | OUTPATIENT
Start: 2025-09-03

## 2025-09-03 SDOH — HEALTH STABILITY: PHYSICAL HEALTH: ON AVERAGE, HOW MANY MINUTES DO YOU ENGAGE IN EXERCISE AT THIS LEVEL?: 0 MIN

## 2025-09-03 SDOH — HEALTH STABILITY: PHYSICAL HEALTH: ON AVERAGE, HOW MANY DAYS PER WEEK DO YOU ENGAGE IN MODERATE TO STRENUOUS EXERCISE (LIKE A BRISK WALK)?: 0 DAYS

## 2025-09-03 ASSESSMENT — PATIENT HEALTH QUESTIONNAIRE - PHQ9
10. IF YOU CHECKED OFF ANY PROBLEMS, HOW DIFFICULT HAVE THESE PROBLEMS MADE IT FOR YOU TO DO YOUR WORK, TAKE CARE OF THINGS AT HOME, OR GET ALONG WITH OTHER PEOPLE: NOT DIFFICULT AT ALL
SUM OF ALL RESPONSES TO PHQ QUESTIONS 1-9: 6
SUM OF ALL RESPONSES TO PHQ QUESTIONS 1-9: 6

## 2025-09-04 LAB — TSH SERPL DL<=0.005 MIU/L-ACNC: 3.53 UIU/ML (ref 0.3–4.2)
